# Patient Record
Sex: FEMALE | Race: WHITE | NOT HISPANIC OR LATINO | Employment: PART TIME | URBAN - METROPOLITAN AREA
[De-identification: names, ages, dates, MRNs, and addresses within clinical notes are randomized per-mention and may not be internally consistent; named-entity substitution may affect disease eponyms.]

---

## 2017-01-02 DIAGNOSIS — F41.9 ANXIETY AND DEPRESSION: Primary | ICD-10-CM

## 2017-01-02 DIAGNOSIS — F32.A ANXIETY AND DEPRESSION: Primary | ICD-10-CM

## 2017-01-02 NOTE — TELEPHONE ENCOUNTER
Medication is being filled for 1 time refill only due to:  Patient needs to be seen because due for follow up..

## 2017-01-02 NOTE — TELEPHONE ENCOUNTER
Sertraline     Last Written Prescription Date: 10/9/16  Last Fill Quantity: 135, # refills: 0  Last Office Visit with [facility] primary care provider:  7/25/16        Last PHQ-9 score on record=   PHQ-9 SCORE 7/25/2016   Total Score -   Total Score 0

## 2017-01-02 NOTE — TELEPHONE ENCOUNTER
Routing refill request to provider for review/approval because:  Drug interaction warning  Was going to refill 1 time but drug interaction warning popped up.

## 2017-01-11 DIAGNOSIS — E78.5 HYPERLIPIDEMIA WITH TARGET LDL LESS THAN 160: Primary | ICD-10-CM

## 2017-01-11 NOTE — TELEPHONE ENCOUNTER
pravastatin     Last Written Prescription Date: 12/14/16  Last Fill Quantity: 90, # refills: 0  Last Office Visit with [facility], [facility] or [facility] prescribing provider: 07/25/16       CHOL      196   6/8/2016  HDL       41   6/8/2016  LDL      110   6/8/2016  TRIG      226   6/8/2016  CHOLHDLRATIO      4.0   1/30/2014

## 2017-01-12 RX ORDER — PRAVASTATIN SODIUM 20 MG
20 TABLET ORAL DAILY
Qty: 90 TABLET | Refills: 1 | Status: SHIPPED | OUTPATIENT
Start: 2017-01-12 | End: 2017-08-21

## 2017-01-25 DIAGNOSIS — F41.9 ANXIETY AND DEPRESSION: Primary | ICD-10-CM

## 2017-01-25 DIAGNOSIS — F32.A ANXIETY AND DEPRESSION: Primary | ICD-10-CM

## 2017-01-25 NOTE — TELEPHONE ENCOUNTER
Sertraline     Last Written Prescription Date: 01/02/17  Last Fill Quantity: 45, # refills: 0  Last Office Visit with [facility] [place] [place] primary care provider:  07/25/16        Last PHQ-9 score on record=   PHQ-9 SCORE 7/25/2016   Total Score -   Total Score 0

## 2017-01-25 NOTE — TELEPHONE ENCOUNTER
Routing refill request to provider for review/approval because:  Labs not current:  PHQ9  Patient needs to be seen because:  Due for 6 month office visit.

## 2017-02-07 ENCOUNTER — OFFICE VISIT (OUTPATIENT)
Dept: FAMILY MEDICINE | Facility: CLINIC | Age: 62
End: 2017-02-07
Payer: COMMERCIAL

## 2017-02-07 VITALS
BODY MASS INDEX: 33.13 KG/M2 | HEART RATE: 74 BPM | SYSTOLIC BLOOD PRESSURE: 134 MMHG | WEIGHT: 208.6 LBS | OXYGEN SATURATION: 95 % | TEMPERATURE: 98.8 F | DIASTOLIC BLOOD PRESSURE: 74 MMHG

## 2017-02-07 DIAGNOSIS — F41.9 ANXIETY AND DEPRESSION: Primary | ICD-10-CM

## 2017-02-07 DIAGNOSIS — F32.A ANXIETY AND DEPRESSION: Primary | ICD-10-CM

## 2017-02-07 DIAGNOSIS — I10 HYPERTENSION, GOAL BELOW 140/90: ICD-10-CM

## 2017-02-07 PROCEDURE — 99213 OFFICE O/P EST LOW 20 MIN: CPT | Performed by: PHYSICIAN ASSISTANT

## 2017-02-07 RX ORDER — SERTRALINE HYDROCHLORIDE 100 MG/1
100 TABLET, FILM COATED ORAL DAILY
Qty: 30 TABLET | Refills: 4 | Status: SHIPPED | OUTPATIENT
Start: 2017-02-07 | End: 2017-08-21

## 2017-02-07 RX ORDER — PREDNISONE 5 MG/1
TABLET ORAL
Refills: 3 | COMMUNITY
Start: 2016-11-03 | End: 2021-05-20

## 2017-02-07 RX ORDER — LOSARTAN POTASSIUM 25 MG/1
25 TABLET ORAL DAILY
Qty: 30 TABLET | Refills: 5 | Status: SHIPPED | OUTPATIENT
Start: 2017-02-07 | End: 2017-08-28

## 2017-02-07 RX ORDER — ALPRAZOLAM 0.5 MG
0.5 TABLET ORAL 2 TIMES DAILY PRN
Qty: 20 TABLET | Refills: 2 | Status: SHIPPED | OUTPATIENT
Start: 2017-02-07 | End: 2017-08-25

## 2017-02-07 RX ORDER — LEUCOVORIN CALCIUM 5 MG/1
5 TABLET ORAL DAILY
COMMUNITY
Start: 2017-02-06

## 2017-02-07 NOTE — NURSING NOTE
"Chief Complaint   Patient presents with     Recheck Medication       Initial /83 mmHg  Pulse 74  Temp(Src) 98.8  F (37.1  C) (Oral)  Wt 208 lb 9.6 oz (94.62 kg)  SpO2 95% Estimated body mass index is 33.13 kg/(m^2) as calculated from the following:    Height as of 7/28/16: 5' 6.53\" (1.69 m).    Weight as of this encounter: 208 lb 9.6 oz (94.62 kg).  Medication Reconciliation: complete   Sheba Florian MA      "

## 2017-02-07 NOTE — MR AVS SNAPSHOT
"              After Visit Summary   2/7/2017    Erica Ramos    MRN: [id number]           Patient Information     Date Of Birth          1955        Visit Information        Provider Department      2/7/2017 1:00 PM Leonela Ibanez PA-C [facility]        Today's Diagnoses     Anxiety and depression    -  1     Essential hypertension with goal blood pressure less than 140/90           Care Instructions    Restart Zoloft by taking 25mg (half tab) daily x1 week. Then increase to 1 full tab daily x1 week. Then increase to 1.5 tabs daily x1 week then go  a new prescription for 100mg tabs.  We'll follow up over the phone in 6 weeks.        Follow-ups after your visit        Who to contact     Normal or non-critical lab and imaging results will be communicated to you by Exaleadhart, letter or phone within 4 business days after the clinic has received the results. If you do not hear from us within 7 days, please contact the clinic through Exaleadhart or phone. If you have a critical or abnormal lab result, we will notify you by phone as soon as possible.  Submit refill requests through Kappa Prime or call your pharmacy and they will forward the refill request to us. Please allow 3 business days for your refill to be completed.          If you need to speak with a  for additional information , please call: [phone]             Additional Information About Your Visit        Kappa Prime Information     Kappa Prime lets you send messages to your doctor, view your test results, renew your prescriptions, schedule appointments and more. To sign up, go to [email or web address] . Click on \"Log in\" on the left side of the screen, which will take you to the Welcome page. Then click on \"Sign up Now\" on the right side of the page.     You will be asked to enter the access code listed below, as well as some personal information. Please follow the directions to create your username and password.   "   Your access code is: [email or web address]  Expires: 2017  1:18 PM     Your access code will  in 90 days. If you need help or a new code, please call your [place] clinic or [phone].        Care EveryWhere ID     This is your Care EveryWhere ID. This could be used by other organizations to access your [place] medical records  [id number]        Your Vitals Were     Pulse Temperature Pulse Oximetry             74 98.8  F (37.1  C) (Oral) 95%          Blood Pressure from Last 3 Encounters:   17 154/83   16 123/79   16 151/87    Weight from Last 3 Encounters:   17 208 lb 9.6 oz (94.62 kg)   16 194 lb (87.998 kg)   16 194 lb 12.8 oz (88.361 kg)              We Performed the Following     DEPRESSION ACTION PLAN (DAP)          Today's Medication Changes          These changes are accurate as of: 17  1:18 PM.  If you have any questions, ask your nurse or doctor.               These medicines have changed or have updated prescriptions.        Dose/Directions    * sertraline 50 MG tablet   Commonly known as:  ZOLOFT   This may have changed:    - how much to take  - how to take this  - when to take this  - additional instructions   Used for:  Anxiety and depression   Changed by:  Leonela Ibanez PA-C        Take 1/2 tablet (25 mg) daily for 1 week, then increase to 1 tab daily x1 week then increase to one and a half (75mg) daily then increase to 100mg tabs daily   Quantity:  22 tablet   Refills:  0       * sertraline 100 MG tablet   Commonly known as:  ZOLOFT   This may have changed:  You were already taking a medication with the same name, and this prescription was added. Make sure you understand how and when to take each.   Used for:  Anxiety and depression   Changed by:  Leonela Ibanez PA-C        Dose:  100 mg   Take 1 tablet (100 mg) by mouth daily   Quantity:  30 tablet   Refills:  4       * Notice:  This list has 2 medication(s) that are the same as other  medications prescribed for you. Read the directions carefully, and ask your doctor or other care provider to review them with you.         Where to get your medicines      These medications were sent to [facility]/pharmacy #[id number] - [place], [place] - [address] [facility]  [address], [place] [address]     Phone:  [phone]    - losartan 25 MG tablet  - sertraline 100 MG tablet  - sertraline 50 MG tablet      Some of these will need a paper prescription and others can be bought over the counter.  Ask your nurse if you have questions.     Bring a paper prescription for each of these medications    - ALPRAZolam 0.5 MG tablet             Primary Care Provider Office Phone # Fax #    Leonela Ibanez PA-C [phone]       [facility] [address] [facility] [address]        Thank you!     Thank you for choosing [facility]  for your care. Our goal is always to provide you with excellent care. Hearing back from our patients is one way we can continue to improve our services. Please take a few minutes to complete the written survey that you may receive in the mail after your visit with us. Thank you!             Your Updated Medication List - Protect others around you: Learn how to safely use, store and throw away your medicines at www.disposemymeds.org.          This list is accurate as of: 2/7/17  1:18 PM.  Always use your most recent med list.                   Brand Name Dispense Instructions for use    ALPRAZolam 0.5 MG tablet    XANAX    20 tablet    Take 1 tablet (0.5 mg) by mouth 2 times daily as needed for anxiety - use sparingly       CALCIUM-VITAMIN D PO          HUMIRA 40 MG/0.8ML prefilled syringe kit   Generic drug:  adalimumab      Inject 40 mg Subcutaneous       leucovorin 5 MG tablet    WELLCOVORIN         losartan 25 MG tablet    COZAAR    30 tablet    Take 1 tablet (25 mg) by mouth daily       methotrexate 2.5 MG tablet CHEMO       Take 25 mg by mouth       pravastatin 20 MG tablet    PRAVACHOL    90 tablet    Take 1 tablet (20 mg) by mouth daily       predniSONE 5 MG tablet    DELTASONE     TAKE 2 TABLETS BY MOUTH ONCE DAILY WITH A MEAL.       * sertraline 50 MG tablet    ZOLOFT    22 tablet    Take 1/2 tablet (25 mg) daily for 1 week, then increase to 1 tab daily x1 week then increase to one and a half (75mg) daily then increase to 100mg tabs daily       * sertraline 100 MG tablet    ZOLOFT    30 tablet    Take 1 tablet (100 mg) by mouth daily       * Notice:  This list has 2 medication(s) that are the same as other medications prescribed for you. Read the directions carefully, and ask your doctor or other care provider to review them with you.

## 2017-02-07 NOTE — PROGRESS NOTES
SUBJECTIVE:                                                    Erica Ramos is a 61 year old female who presents to clinic today for the following health issues:    Hyperlipidemia Follow-Up      Rate your low fat/cholesterol diet?: good    Taking statin?  Yes, no muscle aches from statin    Other lipid medications/supplements?:  none     Hypertension Follow-up      Outpatient blood pressures are not being checked.    Low Salt Diet: low salt     Depression and Anxiety Follow-Up    Status since last visit: No change    Other associated symptoms:None    Complicating factors:     Significant life event: No     Current substance abuse: None    PHQ: 11  FLORIAN: 8  Has been off of Zoloft almost 1 month  Having anxiety related to Dora's dizziness work up  Uses Xanax less than 3 times per week typically      PHQ-9 SCORE 3/24/2016 4/19/2016 7/25/2016   Total Score - - -   Total Score 19 5 0     FLORIAN-7 SCORE 3/24/2016 4/19/2016 7/25/2016   Total Score - - -   Total Score 15 2 0        PHQ-9  English      PHQ-9   Any Language     GAD7         Amount of exercise or physical activity: None    Problems taking medications regularly: No    Medication side effects: weight gain, stopped prednisone x2-3 weeks     Diet: regular (no restrictions)      Problem list and histories reviewed & adjusted, as indicated.  Additional history: as documented    Patient Active Problem List   Diagnosis     Obesity     Advanced directives, counseling/discussion     Hyperlipidemia with target LDL less than 160     Hypertension, goal below 140/90     Anxiety and depression     Past Surgical History   Procedure Laterality Date     D & c       polyps     Surgical history of -   8-08     aneurysm repair       Social History   Substance Use Topics     Smoking status: Former Smoker     Quit date: 12/09/2006     Smokeless tobacco: Never Used     Alcohol Use: No     Family History   Problem Relation Age of Onset     CANCER Mother      non hodgkins lymphoma      C.A.D. Father      Hypertension Father      Hypertension Brother      CEREBROVASCULAR DISEASE Father      CANCER Daughter      lymphoma           ROS:  Constitutional, cardiac, and psychiatric systems are negative, except as otherwise noted.    OBJECTIVE:                                                    /74 mmHg  Pulse 74  Temp(Src) 98.8  F (37.1  C) (Oral)  Wt 208 lb 9.6 oz (94.62 kg)  SpO2 95%  Body mass index is 33.13 kg/(m^2).  Constitutional: healthy, alert, active, no distress.    Musculoskeletal: extremities normal- no gross deformities noted, gait normal, normal muscle tone and able to move about the exam room without difficulty.    Skin: no suspicious lesions or rashes appreciated on exposed areas  Neurologic: Gait normal. Moving all extremities spontaneously, no apparent weakness.    Psychiatric: mentation appears normal, thoughts are clear and concise. Converses appropriately. Affect is Appropriate/mood-congruent       ASSESSMENT:                                                      1. Anxiety and depression    2. Hypertension, goal below 140/90         PLAN:                                                    Patient will restart losartan. Labs and OV again in 6 months.    Will restart Zoloft. Xanax PRN - is using appropriately. Meds renewed.     Patient Instructions   Restart Zoloft by taking 25mg (half tab) daily x1 week. Then increase to 1 full tab daily x1 week. Then increase to 1.5 tabs daily x1 week then go  a new prescription for 100mg tabs.  We'll follow up over the phone in 6 weeks.       The patient was in agreement with the plan today and had no questions or concerns prior to leaving the clinic.     Leonela Ibanez PA-C  [facility]

## 2017-02-07 NOTE — PATIENT INSTRUCTIONS
Restart Zoloft by taking 25mg (half tab) daily x1 week. Then increase to 1 full tab daily x1 week. Then increase to 1.5 tabs daily x1 week then go  a new prescription for 100mg tabs.  We'll follow up over the phone in 6 weeks.

## 2017-03-16 ENCOUNTER — TELEPHONE (OUTPATIENT)
Dept: FAMILY MEDICINE | Facility: CLINIC | Age: 62
End: 2017-03-16

## 2017-03-21 ENCOUNTER — TELEPHONE (OUTPATIENT)
Dept: FAMILY MEDICINE | Facility: CLINIC | Age: 62
End: 2017-03-21

## 2017-03-21 ASSESSMENT — ANXIETY QUESTIONNAIRES
3. WORRYING TOO MUCH ABOUT DIFFERENT THINGS: NOT AT ALL
IF YOU CHECKED OFF ANY PROBLEMS ON THIS QUESTIONNAIRE, HOW DIFFICULT HAVE THESE PROBLEMS MADE IT FOR YOU TO DO YOUR WORK, TAKE CARE OF THINGS AT HOME, OR GET ALONG WITH OTHER PEOPLE: NOT DIFFICULT AT ALL
7. FEELING AFRAID AS IF SOMETHING AWFUL MIGHT HAPPEN: NOT AT ALL
2. NOT BEING ABLE TO STOP OR CONTROL WORRYING: NOT AT ALL
1. FEELING NERVOUS, ANXIOUS, OR ON EDGE: SEVERAL DAYS
5. BEING SO RESTLESS THAT IT IS HARD TO SIT STILL: NOT AT ALL
6. BECOMING EASILY ANNOYED OR IRRITABLE: SEVERAL DAYS
GAD7 TOTAL SCORE: 3

## 2017-03-21 ASSESSMENT — PATIENT HEALTH QUESTIONNAIRE - PHQ9: 5. POOR APPETITE OR OVEREATING: SEVERAL DAYS

## 2017-03-21 NOTE — TELEPHONE ENCOUNTER
Patient notified and voiced understanding and agreement.  Will meliton when needs refill.  Deb Green RN

## 2017-03-21 NOTE — TELEPHONE ENCOUNTER
"Things seem to be going well and scores are within \"normal\" range - I think let's continue the medication without changes.   "

## 2017-03-21 NOTE — TELEPHONE ENCOUNTER
Restarted Zoloft and up to 100mg daily.    1.) How are they doing since restarting medication? Good, doing well   2.) Have they noticed improvements in mood? yes  3.) Do they feel as though there is room for improvement? Maybe     PHQ-9 SCORE 4/19/2016 7/25/2016 3/21/2017   Total Score - - -   Total Score 5 0 5     FLORIAN-7 SCORE 4/19/2016 7/25/2016 3/21/2017   Total Score - - -   Total Score 2 0 3

## 2017-03-22 ASSESSMENT — PATIENT HEALTH QUESTIONNAIRE - PHQ9: SUM OF ALL RESPONSES TO PHQ QUESTIONS 1-9: 5

## 2017-03-22 ASSESSMENT — ANXIETY QUESTIONNAIRES: GAD7 TOTAL SCORE: 3

## 2017-06-29 ENCOUNTER — TELEPHONE (OUTPATIENT)
Dept: FAMILY MEDICINE | Facility: CLINIC | Age: 62
End: 2017-06-29

## 2017-06-29 NOTE — TELEPHONE ENCOUNTER
Pt states she used to see a rheumatologist at [facility] , he left and they will no longer let her get her blood drawn , she wants blood drawn here , advised rheumatology apt here ,she said no she just wants blood drawn , tried to explain that is not how it works , she said she just wants blood drawn then abruptly ended conversation.

## 2017-06-29 NOTE — TELEPHONE ENCOUNTER
Patient is calling would like to discuss blood work with provider. Please call to discuss. Thank you.

## 2017-08-21 DIAGNOSIS — F41.9 ANXIETY AND DEPRESSION: ICD-10-CM

## 2017-08-21 DIAGNOSIS — F32.A ANXIETY AND DEPRESSION: ICD-10-CM

## 2017-08-21 DIAGNOSIS — E78.5 HYPERLIPIDEMIA WITH TARGET LDL LESS THAN 160: ICD-10-CM

## 2017-08-21 NOTE — LETTER
August 23, 2017       Erica Ramos  [address]  [place] [address]      Erica Ramos     Your medication has been approved for sertraline (ZOLOFT) 100 MG tablet and pravastatin (PRAVACHOL) 20 MG tablet for one time only.    However, you are due for a follow up appointment and labs for further refills. Please schedule this visit at your earliest convenience.      Leonela Ibanez's Care Team

## 2017-08-22 NOTE — TELEPHONE ENCOUNTER
Routing refill request to provider for review/approval because:  Labs not current:  cholesterol  When is patient due for follow up with Sertraline?  Pended for completion and approval.  Deb Green RN

## 2017-08-22 NOTE — TELEPHONE ENCOUNTER
sertraline     Last Written Prescription Date: 07/26/17  Last Fill Quantity: 30, # refills: 4  Last Office Visit with [facility] primary care provider:  02/07/17        Last PHQ-9 score on record=   PHQ-9 SCORE 3/21/2017   Total Score -   Total Score 5         Pravastatin     Last Written Prescription Date: 05/06/17  Last Fill Quantity: 90, # refills: 1  Last Office Visit with [facility], [facility] or [facility] prescribing provider: 02/07/17       Lab Results   Component Value Date    CHOL 196 06/08/2016     Lab Results   Component Value Date    HDL 41 06/08/2016     Lab Results   Component Value Date     06/08/2016     Lab Results   Component Value Date    TRIG 226 06/08/2016     Lab Results   Component Value Date    CHOLHDLRATIO 4.0 01/30/2014

## 2017-08-23 RX ORDER — PRAVASTATIN SODIUM 20 MG
20 TABLET ORAL DAILY
Qty: 90 TABLET | Refills: 0 | Status: SHIPPED | OUTPATIENT
Start: 2017-08-23 | End: 2017-08-28

## 2017-08-23 RX ORDER — SERTRALINE HYDROCHLORIDE 100 MG/1
TABLET, FILM COATED ORAL
Qty: 90 TABLET | Refills: 0 | Status: SHIPPED | OUTPATIENT
Start: 2017-08-23 | End: 2017-08-28

## 2017-08-25 DIAGNOSIS — F32.A ANXIETY AND DEPRESSION: ICD-10-CM

## 2017-08-25 DIAGNOSIS — F41.9 ANXIETY AND DEPRESSION: ICD-10-CM

## 2017-08-25 RX ORDER — ALPRAZOLAM 0.5 MG
0.5 TABLET ORAL 2 TIMES DAILY PRN
Qty: 20 TABLET | Refills: 2 | Status: SHIPPED | OUTPATIENT
Start: 2017-08-25 | End: 2021-05-20

## 2017-08-25 NOTE — TELEPHONE ENCOUNTER
Patient is calling asking about status of Rx. Patient is completely out. Patient needs Rx pravastatin (PRAVACHOL) 20 MG tablet and sertraline (ZOLOFT) 100 MG tablet.   Please call to advise  Thank you

## 2017-08-28 ENCOUNTER — OFFICE VISIT (OUTPATIENT)
Dept: FAMILY MEDICINE | Facility: CLINIC | Age: 62
End: 2017-08-28
Payer: COMMERCIAL

## 2017-08-28 VITALS
DIASTOLIC BLOOD PRESSURE: 82 MMHG | WEIGHT: 201 LBS | TEMPERATURE: 98.3 F | SYSTOLIC BLOOD PRESSURE: 131 MMHG | HEART RATE: 66 BPM | BODY MASS INDEX: 31.92 KG/M2

## 2017-08-28 DIAGNOSIS — Z78.0 POSTMENOPAUSAL STATUS: ICD-10-CM

## 2017-08-28 DIAGNOSIS — M05.9 RHEUMATOID ARTHRITIS WITH POSITIVE RHEUMATOID FACTOR, INVOLVING UNSPECIFIED SITE (H): ICD-10-CM

## 2017-08-28 DIAGNOSIS — F41.9 ANXIETY AND DEPRESSION: ICD-10-CM

## 2017-08-28 DIAGNOSIS — F32.A ANXIETY AND DEPRESSION: ICD-10-CM

## 2017-08-28 DIAGNOSIS — Z12.11 COLON CANCER SCREENING: ICD-10-CM

## 2017-08-28 DIAGNOSIS — E78.5 HYPERLIPIDEMIA LDL GOAL <130: ICD-10-CM

## 2017-08-28 DIAGNOSIS — Z12.31 ENCOUNTER FOR SCREENING MAMMOGRAM FOR BREAST CANCER: ICD-10-CM

## 2017-08-28 DIAGNOSIS — I10 HYPERTENSION, GOAL BELOW 140/90: Primary | ICD-10-CM

## 2017-08-28 LAB
ANION GAP SERPL CALCULATED.3IONS-SCNC: 8 MMOL/L (ref 3–14)
BUN SERPL-MCNC: 14 MG/DL (ref 7–30)
CALCIUM SERPL-MCNC: 9.7 MG/DL (ref 8.5–10.1)
CHLORIDE SERPL-SCNC: 103 MMOL/L (ref 94–109)
CHOLEST SERPL-MCNC: 241 MG/DL
CO2 SERPL-SCNC: 28 MMOL/L (ref 20–32)
CREAT SERPL-MCNC: 0.64 MG/DL (ref 0.52–1.04)
CREAT UR-MCNC: 29 MG/DL
GFR SERPL CREATININE-BSD FRML MDRD: >90 ML/MIN/1.7M2
GLUCOSE SERPL-MCNC: 85 MG/DL (ref 70–99)
HDLC SERPL-MCNC: 55 MG/DL
LDLC SERPL CALC-MCNC: 151 MG/DL
MICROALBUMIN UR-MCNC: <5 MG/L
MICROALBUMIN/CREAT UR: NORMAL MG/G CR (ref 0–25)
NONHDLC SERPL-MCNC: 186 MG/DL
POTASSIUM SERPL-SCNC: 4.2 MMOL/L (ref 3.4–5.3)
SODIUM SERPL-SCNC: 139 MMOL/L (ref 133–144)
TRIGL SERPL-MCNC: 174 MG/DL

## 2017-08-28 PROCEDURE — 80048 BASIC METABOLIC PNL TOTAL CA: CPT | Performed by: PHYSICIAN ASSISTANT

## 2017-08-28 PROCEDURE — 80061 LIPID PANEL: CPT | Performed by: PHYSICIAN ASSISTANT

## 2017-08-28 PROCEDURE — 99214 OFFICE O/P EST MOD 30 MIN: CPT | Performed by: PHYSICIAN ASSISTANT

## 2017-08-28 PROCEDURE — 82043 UR ALBUMIN QUANTITATIVE: CPT | Performed by: PHYSICIAN ASSISTANT

## 2017-08-28 PROCEDURE — 36415 COLL VENOUS BLD VENIPUNCTURE: CPT | Performed by: PHYSICIAN ASSISTANT

## 2017-08-28 RX ORDER — LOSARTAN POTASSIUM 25 MG/1
25 TABLET ORAL DAILY
Qty: 90 TABLET | Refills: 3 | Status: SHIPPED | OUTPATIENT
Start: 2017-08-28 | End: 2018-08-22

## 2017-08-28 RX ORDER — PRAVASTATIN SODIUM 20 MG
20 TABLET ORAL DAILY
Qty: 90 TABLET | Refills: 3 | Status: SHIPPED | OUTPATIENT
Start: 2017-08-28 | End: 2018-08-22

## 2017-08-28 RX ORDER — SERTRALINE HYDROCHLORIDE 100 MG/1
100 TABLET, FILM COATED ORAL DAILY
Qty: 90 TABLET | Refills: 3 | Status: SHIPPED | OUTPATIENT
Start: 2017-08-28 | End: 2018-08-11

## 2017-08-28 ASSESSMENT — ANXIETY QUESTIONNAIRES
3. WORRYING TOO MUCH ABOUT DIFFERENT THINGS: MORE THAN HALF THE DAYS
7. FEELING AFRAID AS IF SOMETHING AWFUL MIGHT HAPPEN: MORE THAN HALF THE DAYS
6. BECOMING EASILY ANNOYED OR IRRITABLE: MORE THAN HALF THE DAYS
2. NOT BEING ABLE TO STOP OR CONTROL WORRYING: SEVERAL DAYS
1. FEELING NERVOUS, ANXIOUS, OR ON EDGE: MORE THAN HALF THE DAYS
IF YOU CHECKED OFF ANY PROBLEMS ON THIS QUESTIONNAIRE, HOW DIFFICULT HAVE THESE PROBLEMS MADE IT FOR YOU TO DO YOUR WORK, TAKE CARE OF THINGS AT HOME, OR GET ALONG WITH OTHER PEOPLE: VERY DIFFICULT

## 2017-08-28 ASSESSMENT — PATIENT HEALTH QUESTIONNAIRE - PHQ9: 5. POOR APPETITE OR OVEREATING: SEVERAL DAYS

## 2017-08-28 NOTE — MR AVS SNAPSHOT
After Visit Summary   8/28/2017    Erica Ramos    MRN: [id number]           Patient Information     Date Of Birth          1955        Visit Information        Provider Department      8/28/2017 12:00 PM Leonela Ibanez PA-C [facility]        Today's Diagnoses     Hypertension, goal below 140/90    -  1    Anxiety and depression        Hyperlipidemia LDL goal <130        Rheumatoid arthritis with positive rheumatoid factor, involving unspecified site (H)        Colon cancer screening        Encounter for screening mammogram for breast cancer        Postmenopausal status           Follow-ups after your visit        Additional Services     GASTROENTEROLOGY ADULT REF PROCEDURE ONLY       Last Lab Result: Creatinine (mg/dL)       Date                     Value                 06/08/2016               0.46 (L)         ----------  Body mass index is 31.92 kg/(m^2).     Needed:  No  Language:  English    Patient will be contacted to schedule procedure.     Please be aware that coverage of these services is subject to the terms and limitations of your health insurance plan.  Call member services at your health plan with any benefit or coverage questions.  Any procedures must be performed at a [place] facility OR coordinated by your clinic's referral office.    Please bring the following with you to your appointment:    (1) Any X-Rays, CTs or MRIs which have been performed.  Contact the facility where they were done to arrange for  prior to your scheduled appointment.    (2) List of current medications   (3) This referral request   (4) Any documents/labs given to you for this referral                  Future tests that were ordered for you today     Open Future Orders        Priority Expected Expires Ordered    *MA Screening Digital Bilateral Routine  8/28/2018 8/28/2017    DX Hip/Pelvis/Spine Routine  8/28/2018 8/28/2017            Who to contact     Normal or  "non-critical lab and imaging results will be communicated to you by MyChart, letter or phone within 4 business days after the clinic has received the results. If you do not hear from us within 7 days, please contact the clinic through FastHealthhart or phone. If you have a critical or abnormal lab result, we will notify you by phone as soon as possible.  Submit refill requests through Cardiio or call your pharmacy and they will forward the refill request to us. Please allow 3 business days for your refill to be completed.          If you need to speak with a  for additional information , please call: [phone]             Additional Information About Your Visit        FastHealthharNanoGram Information     Cardiio lets you send messages to your doctor, view your test results, renew your prescriptions, schedule appointments and more. To sign up, go to [email or web address] . Click on \"Log in\" on the left side of the screen, which will take you to the Welcome page. Then click on \"Sign up Now\" on the right side of the page.     You will be asked to enter the access code listed below, as well as some personal information. Please follow the directions to create your username and password.     Your access code is: [email or web address]  Expires: 2017 12:27 PM     Your access code will  in 90 days. If you need help or a new code, please call your [place] clinic or [phone].        Care EveryWhere ID     This is your Care EveryWhere ID. This could be used by other organizations to access your [place] medical records  [id number]        Your Vitals Were     Pulse Temperature BMI (Body Mass Index)             66 98.3  F (36.8  C) (Oral) 31.92 kg/m2          Blood Pressure from Last 3 Encounters:   17 131/82   17 134/74   16 123/79    Weight from Last 3 Encounters:   17 201 lb (91.2 kg)   17 208 lb 9.6 oz (94.6 kg)   16 194 lb (88 kg)              We Performed the Following     " Albumin Random Urine Quantitative with Creat Ratio     Basic metabolic panel     GASTROENTEROLOGY ADULT REF PROCEDURE ONLY     Lipid panel reflex to direct LDL          Today's Medication Changes          These changes are accurate as of: 8/28/17 12:34 PM.  If you have any questions, ask your nurse or doctor.               These medicines have changed or have updated prescriptions.        Dose/Directions    pravastatin 20 MG tablet   Commonly known as:  PRAVACHOL   This may have changed:  additional instructions   Used for:  Hyperlipidemia LDL goal <130        Dose:  20 mg   Take 1 tablet (20 mg) by mouth daily   Quantity:  90 tablet   Refills:  3       sertraline 100 MG tablet   Commonly known as:  ZOLOFT   This may have changed:  See the new instructions.   Used for:  Anxiety and depression        Dose:  100 mg   Take 1 tablet (100 mg) by mouth daily   Quantity:  90 tablet   Refills:  3            Where to get your medicines      These medications were sent to [facility]/pharmacy #[id number] - [place], [place] - [address], [place] [address]     Phone:  [phone]     losartan 25 MG tablet    pravastatin 20 MG tablet    sertraline 100 MG tablet                Primary Care Provider Office Phone # Fax #    Leonela Ibanez PA-C [phone]       [address] [place] [address]        Equal Access to Services     EDITH EDWARDS : Hadii carl ku hadasho Soomaali, waaxda luqadaha, qaybta kaalmada adeegyada, waxay warren kovcas. So [facility] [phone].    ATENCIÓN: Si habla español, tiene a gordon disposición servicios gratuitos de asistencia lingüística. Llame al [phone].    We comply with applicable federal civil rights laws and [place] laws. We do not discriminate on the basis of race, color, national origin, age, disability sex, sexual orientation or gender identity.            Thank you!     Thank you for choosing [place]  [facility]  for your care. Our goal is always to provide you with excellent care. Hearing back from our patients is one way we can continue to improve our services. Please take a few minutes to complete the written survey that you may receive in the mail after your visit with us. Thank you!             Your Updated Medication List - Protect others around you: Learn how to safely use, store and throw away your medicines at www.disposemymeds.org.          This list is accurate as of: 8/28/17 12:34 PM.  Always use your most recent med list.                   Brand Name Dispense Instructions for use Diagnosis    ALPRAZolam 0.5 MG tablet    XANAX    20 tablet    Take 1 tablet (0.5 mg) by mouth 2 times daily as needed for anxiety - use sparingly    Anxiety and depression       ASPIRIN ADULT LOW STRENGTH PO           CALCIUM-VITAMIN D PO           HUMIRA 40 MG/0.8ML prefilled syringe kit   Generic drug:  adalimumab      Inject 40 mg Subcutaneous        leucovorin 5 MG tablet    WELLCOVORIN          losartan 25 MG tablet    COZAAR    90 tablet    Take 1 tablet (25 mg) by mouth daily    Hypertension, goal below 140/90       methotrexate 2.5 MG tablet CHEMO      Take 25 mg by mouth        pravastatin 20 MG tablet    PRAVACHOL    90 tablet    Take 1 tablet (20 mg) by mouth daily    Hyperlipidemia LDL goal <130       predniSONE 5 MG tablet    DELTASONE     TAKE 2 TABLETS BY MOUTH ONCE DAILY WITH A MEAL.        sertraline 100 MG tablet    ZOLOFT    90 tablet    Take 1 tablet (100 mg) by mouth daily    Anxiety and depression

## 2017-08-28 NOTE — NURSING NOTE
"Chief Complaint   Patient presents with     Anxiety     Depression     Hypertension     Lipids       Initial /82  Pulse 66  Temp 98.3  F (36.8  C) (Oral)  Wt 201 lb (91.2 kg)  BMI 31.92 kg/m2 Estimated body mass index is 31.92 kg/(m^2) as calculated from the following:    Height as of 7/28/16: 5' 6.53\" (1.69 m).    Weight as of this encounter: 201 lb (91.2 kg).  Medication Reconciliation: complete       Malka Forbes CMA        "

## 2017-08-28 NOTE — PROGRESS NOTES
SUBJECTIVE:   Erica Ramos is a 62 year old female who presents to clinic today for the following health issues:      Hyperlipidemia Follow-Up      Rate your low fat/cholesterol diet?: fair    Taking statin?  No    Other lipid medications/supplements?:  none    Hypertension Follow-up      Outpatient blood pressures are being checked at store.  Results are 115/70s.    Low Salt Diet: low salt    Depression and Anxiety Follow-Up    Status since last visit: No change    Other associated symptoms:None    Complicating factors:     Significant life event: Yes-  Daughter in a MVA     Current substance abuse: None    PHQ-9 SCORE 4/19/2016 7/25/2016 3/21/2017   Total Score - - -   Total Score 5 0 5     FLORIAN-7 SCORE 4/19/2016 7/25/2016 3/21/2017   Total Score - - -   Total Score 2 0 3       PHQ-9  English  PHQ-9   Any Language  GAD7          Amount of exercise or physical activity: 2-3 days/week for an average of 15-30 minutes    Problems taking medications regularly: No    Medication side effects: none  Diet: regular (no restrictions)        Problem list and histories reviewed & adjusted, as indicated.  Additional history: as documented    Patient Active Problem List   Diagnosis     Obesity     Advanced directives, counseling/discussion     Hyperlipidemia LDL goal <130     Hypertension, goal below 140/90     Anxiety and depression     Rheumatoid arthritis with positive rheumatoid factor, involving unspecified site (H)     Past Surgical History:   Procedure Laterality Date     D & C      polyps     SURGICAL HISTORY OF - 8-08    aneurysm repair       Social History   Substance Use Topics     Smoking status: Former Smoker     Quit date: 12/9/2006     Smokeless tobacco: Never Used     Alcohol use No     Family History   Problem Relation Age of Onset     CANCER Mother      non hodgkins lymphoma     C.A.D. Father      Hypertension Father      Hypertension Brother      CEREBROVASCULAR DISEASE Father      CANCER Daughter       lymphoma             Reviewed and updated as needed this visit by clinical staffAllergies  Meds       Reviewed and updated as needed this visit by Provider         ROS:  Constitutional, cardiac, and psychiatric systems are negative, except as otherwise noted.      OBJECTIVE:                                                    /82  Pulse 66  Temp 98.3  F (36.8  C) (Oral)  Wt 201 lb (91.2 kg)  BMI 31.92 kg/m2  Body mass index is 31.92 kg/(m^2).  GENERAL APPEARANCE: healthy, alert and no distress  RESP: lungs clear to auscultation - no rales, rhonchi or wheezes  CV: regular rates and rhythm, normal S1 S2, no S3 or S4, no murmur, click or rub, no irregular beats, peripheral pulses strong and no bruits heard  PSYCH: mentation appears normal and affect normal/bright       ASSESSMENT:                                                      1. Hypertension, goal below 140/90    2. Anxiety and depression    3. Hyperlipidemia LDL goal <130    4. Rheumatoid arthritis with positive rheumatoid factor, involving unspecified site (H)    5. Colon cancer screening    6. Encounter for screening mammogram for breast cancer    7. Postmenopausal status         PLAN:                                                    Labs today. Meds renewed, no changes. Screening measures discussed. Follow up annually. She will continue to follow up with rheum for her RA.    The patient was in agreement with the plan today and had no questions or concerns prior to leaving the clinic.     Leonela Ibanez PA-C  [facility]

## 2017-08-28 NOTE — LETTER
August 30, 2017      Erica Ramos  [address]  [place] [address]        Dear ,    We are writing to inform you of your test results.    Return for repeat lipid panel in the next couple weeks. Fasting at least 8-10 hours, water is ok.    Resulted Orders   Lipid panel reflex to direct LDL   Result Value Ref Range    Cholesterol 241 (H) <200 mg/dL      Comment:      Desirable:       <200 mg/dl    Triglycerides 174 (H) <150 mg/dL      Comment:      Borderline high:  150-199 mg/dl  High:             200-499 mg/dl  Very high:       >499 mg/dl  Fasting specimen      HDL Cholesterol 55 >49 mg/dL    LDL Cholesterol Calculated 151 (H) <100 mg/dL      Comment:      Above desirable:  100-129 mg/dl  Borderline High:  130-159 mg/dL  High:             160-189 mg/dL  Very high:       >189 mg/dl      Non HDL Cholesterol 186 (H) <130 mg/dL      Comment:      Above Desirable:  130-159 mg/dl  Borderline high:  160-189 mg/dl  High:             190-219 mg/dl  Very high:       >219 mg/dl     Basic metabolic panel   Result Value Ref Range    Sodium 139 133 - 144 mmol/L    Potassium 4.2 3.4 - 5.3 mmol/L    Chloride 103 94 - 109 mmol/L    Carbon Dioxide 28 20 - 32 mmol/L    Anion Gap 8 3 - 14 mmol/L    Glucose 85 70 - 99 mg/dL      Comment:      Fasting specimen    Urea Nitrogen 14 7 - 30 mg/dL    Creatinine 0.64 0.52 - 1.04 mg/dL    GFR Estimate >90 >60 mL/min/1.7m2      Comment:      Non  GFR Calc    GFR Estimate If Black >90 >60 mL/min/1.7m2      Comment:       GFR Calc    Calcium 9.7 8.5 - 10.1 mg/dL   Albumin Random Urine Quantitative with Creat Ratio   Result Value Ref Range    Creatinine Urine 29 mg/dL    Albumin Urine mg/L <5 mg/L    Albumin Urine mg/g Cr Unable to calculate due to low value 0 - 25 mg/g Cr       If you have any questions or concerns, please call the clinic at the number listed above.       Sincerely,      Leonela Ibanez PA-C/susan

## 2017-08-29 ENCOUNTER — TELEPHONE (OUTPATIENT)
Dept: FAMILY MEDICINE | Facility: CLINIC | Age: 62
End: 2017-08-29

## 2017-08-29 DIAGNOSIS — E78.5 HYPERLIPIDEMIA LDL GOAL <130: Primary | ICD-10-CM

## 2017-08-29 NOTE — PROGRESS NOTES
Please send the following letter to the patient:    Nannette,    These are your lab results for your records.    Please call me with any questions or concerns.          Leonela Ibanez PA-C

## 2017-08-29 NOTE — TELEPHONE ENCOUNTER
Please call patient with the following info:    Urine protein test is negative. Kidney function looks great. Electrolytes are normal.  Her cholesterol has become elevated. Her LDL goal is <130. Is she taking her pravastatin regularly?

## 2017-08-30 NOTE — TELEPHONE ENCOUNTER
Spoke with patient and went over results per Lenoela Ibanez PA-C see below read word for word.  Patient verbalized understanding and stated she is taking her pravastatin as prescribed.  Patient was not fasting for the blood work.  Patent stated she had eaten cheese puffs and had coffee with cream before her appointment.

## 2017-09-14 ENCOUNTER — RADIANT APPOINTMENT (OUTPATIENT)
Dept: MAMMOGRAPHY | Facility: CLINIC | Age: 62
End: 2017-09-14
Attending: PHYSICIAN ASSISTANT
Payer: COMMERCIAL

## 2017-09-14 DIAGNOSIS — Z12.31 VISIT FOR SCREENING MAMMOGRAM: ICD-10-CM

## 2017-09-14 PROCEDURE — G0202 SCR MAMMO BI INCL CAD: HCPCS | Mod: TC

## 2017-10-20 ENCOUNTER — TELEPHONE (OUTPATIENT)
Dept: FAMILY MEDICINE | Facility: CLINIC | Age: 62
End: 2017-10-20

## 2017-10-20 NOTE — TELEPHONE ENCOUNTER
Patient states that she has jury duty on Monday and would like a note excusing her from it as she cannot leave her daughter alone.  Please call when done.    Thank you.

## 2017-10-20 NOTE — TELEPHONE ENCOUNTER
Spoke with patient. Her daughter has a low IQ, learning disability, hx of seizures. She was unable to find care for her for jury duty.    Done, in my out basket. Please fax letter with the following info:  Fax: [phone]  Attn: [facility] [employer]  Juror Number: [id number]

## 2017-10-20 NOTE — TELEPHONE ENCOUNTER
Spoke with patient, her daughter is 24 but has learning disabilities, seizures, hearing loss. Daughter is a vulnerable adult needs 24 hour care. Patient requesting to have letter faxed to [phone]

## 2017-10-20 NOTE — LETTER
[facility]  [address] [place] [address]  Phone: [phone]    October 20, 2017        Erica Ramos  [address]  [place] [address]        To whom it may concern:    RE: Erica Ramos, juror #[phone]    Erica is under my care as a patient. She reports she has a daughter with a history of a learning disability, low IQ, and seizures. She is unable to leave her daughter alone. Patient is scheduled for jury duty on Monday 10/23/17, but has been unable to find someone to care for her daughter while she's away. Please excuse her from jury duty.    Please contact me for questions or concerns.      Sincerely,          Leonela Ibanez PA-C

## 2017-12-19 ENCOUNTER — SURGERY (OUTPATIENT)
Age: 62
End: 2017-12-19

## 2017-12-19 ENCOUNTER — HOSPITAL ENCOUNTER (OUTPATIENT)
Facility: AMBULATORY SURGERY CENTER | Age: 62
Discharge: HOME | End: 2017-12-19
Attending: SURGERY | Admitting: SURGERY
Payer: COMMERCIAL

## 2017-12-19 VITALS
TEMPERATURE: 97 F | RESPIRATION RATE: 18 BRPM | OXYGEN SATURATION: 97 % | DIASTOLIC BLOOD PRESSURE: 71 MMHG | SYSTOLIC BLOOD PRESSURE: 122 MMHG | HEART RATE: 68 BPM

## 2017-12-19 LAB — COLONOSCOPY: NORMAL

## 2017-12-19 PROCEDURE — G8918 PT W/O PREOP ORDER IV AB PRO: HCPCS

## 2017-12-19 PROCEDURE — 99152 MOD SED SAME PHYS/QHP 5/>YRS: CPT | Performed by: SURGERY

## 2017-12-19 PROCEDURE — G8907 PT DOC NO EVENTS ON DISCHARG: HCPCS

## 2017-12-19 PROCEDURE — G0121 COLON CA SCRN NOT HI RSK IND: HCPCS | Performed by: SURGERY

## 2017-12-19 PROCEDURE — 45378 DIAGNOSTIC COLONOSCOPY: CPT

## 2017-12-19 RX ORDER — FENTANYL CITRATE 50 UG/ML
INJECTION, SOLUTION INTRAMUSCULAR; INTRAVENOUS PRN
Status: DISCONTINUED | OUTPATIENT
Start: 2017-12-19 | End: 2017-12-19 | Stop reason: HOSPADM

## 2017-12-19 RX ORDER — ONDANSETRON 2 MG/ML
4 INJECTION INTRAMUSCULAR; INTRAVENOUS
Status: DISCONTINUED | OUTPATIENT
Start: 2017-12-19 | End: 2017-12-20 | Stop reason: HOSPADM

## 2017-12-19 RX ORDER — LIDOCAINE 40 MG/G
CREAM TOPICAL
Status: DISCONTINUED | OUTPATIENT
Start: 2017-12-19 | End: 2017-12-20 | Stop reason: HOSPADM

## 2017-12-19 RX ADMIN — FENTANYL CITRATE 100 MCG: 50 INJECTION, SOLUTION INTRAMUSCULAR; INTRAVENOUS at 09:00

## 2018-02-07 ENCOUNTER — TELEPHONE (OUTPATIENT)
Dept: AUDIOLOGY | Facility: CLINIC | Age: 63
End: 2018-02-07

## 2018-02-07 NOTE — TELEPHONE ENCOUNTER
Informed patient that the wax guards and medium closed domes she requested are ready for pick-up at the .     Baron Holm, CCC-A

## 2018-08-11 DIAGNOSIS — F32.A ANXIETY AND DEPRESSION: ICD-10-CM

## 2018-08-11 DIAGNOSIS — F41.9 ANXIETY AND DEPRESSION: ICD-10-CM

## 2018-08-13 ENCOUNTER — TRANSFERRED RECORDS (OUTPATIENT)
Dept: HEALTH INFORMATION MANAGEMENT | Facility: CLINIC | Age: 63
End: 2018-08-13

## 2018-08-13 NOTE — TELEPHONE ENCOUNTER
Please schedule annual exam.  MA- needs PHQ-9  PHQ-9 SCORE 4/19/2016 7/25/2016 3/21/2017   Total Score - - -   Total Score 5 0 5

## 2018-08-13 NOTE — TELEPHONE ENCOUNTER
"Requested Prescriptions   Pending Prescriptions Disp Refills     sertraline (ZOLOFT) 100 MG tablet [Pharmacy Med Name: SERTRALINE  MG TABLET] 90 tablet 3    Last Written Prescription Date:  05/16/18  Last Fill Quantity: 90,  # refills: 3   Last office visit: 8/28/2017 with prescribing provider:  ELIGIO Ibanez Future Office Visit:     Sig: TAKE 1 TABLET (100 MG) BY MOUTH DAILY    SSRIs Protocol Passed    8/11/2018  1:12 AM   FLORIAN-7 SCORE 4/19/2016 7/25/2016 3/21/2017   Total Score - - -   Total Score 2 0 3     PHQ-9 SCORE 4/19/2016 7/25/2016 3/21/2017   Total Score - - -   Total Score 5 0 5         Passed - Recent (12 mo) or future (30 days) visit within the authorizing provider's specialty    Patient had office visit in the last 12 months or has a visit in the next 30 days with authorizing provider or within the authorizing provider's specialty.  See \"Patient Info\" tab in inbasket, or \"Choose Columns\" in Meds & Orders section of the refill encounter.           Passed - Patient is age 18 or older       Passed - No active pregnancy on record       Passed - No positive pregnancy test in last 12 months          "

## 2018-08-14 RX ORDER — SERTRALINE HYDROCHLORIDE 100 MG/1
100 TABLET, FILM COATED ORAL DAILY
Qty: 90 TABLET | Refills: 3 | Status: SHIPPED | OUTPATIENT
Start: 2018-08-14 | End: 2018-08-22

## 2018-08-14 NOTE — TELEPHONE ENCOUNTER
PHQ-9 SCORE 7/25/2016 3/21/2017 8/14/2018   Total Score - - -   Total Score 0 5 5     Appointment made for AFE August 22, 2018    Kodak Brewster MA

## 2018-08-15 ASSESSMENT — PATIENT HEALTH QUESTIONNAIRE - PHQ9: SUM OF ALL RESPONSES TO PHQ QUESTIONS 1-9: 5

## 2018-08-22 ENCOUNTER — OFFICE VISIT (OUTPATIENT)
Dept: FAMILY MEDICINE | Facility: CLINIC | Age: 63
End: 2018-08-22
Payer: COMMERCIAL

## 2018-08-22 VITALS
BODY MASS INDEX: 31.01 KG/M2 | RESPIRATION RATE: 18 BRPM | DIASTOLIC BLOOD PRESSURE: 76 MMHG | HEIGHT: 67 IN | WEIGHT: 197.6 LBS | HEART RATE: 78 BPM | OXYGEN SATURATION: 94 % | TEMPERATURE: 97.9 F | SYSTOLIC BLOOD PRESSURE: 118 MMHG

## 2018-08-22 DIAGNOSIS — F41.9 ANXIETY AND DEPRESSION: ICD-10-CM

## 2018-08-22 DIAGNOSIS — M05.9 RHEUMATOID ARTHRITIS WITH POSITIVE RHEUMATOID FACTOR, INVOLVING UNSPECIFIED SITE (H): ICD-10-CM

## 2018-08-22 DIAGNOSIS — Z00.01 ENCOUNTER FOR ROUTINE ADULT HEALTH EXAMINATION WITH ABNORMAL FINDINGS: Primary | ICD-10-CM

## 2018-08-22 DIAGNOSIS — E78.5 HYPERLIPIDEMIA LDL GOAL <130: ICD-10-CM

## 2018-08-22 DIAGNOSIS — Z01.818 PREOP GENERAL PHYSICAL EXAM: ICD-10-CM

## 2018-08-22 DIAGNOSIS — I10 HYPERTENSION, GOAL BELOW 140/90: ICD-10-CM

## 2018-08-22 DIAGNOSIS — F32.A ANXIETY AND DEPRESSION: ICD-10-CM

## 2018-08-22 LAB
ALBUMIN SERPL-MCNC: 4.2 G/DL (ref 3.4–5)
ALP SERPL-CCNC: 50 U/L (ref 40–150)
ALT SERPL W P-5'-P-CCNC: 40 U/L (ref 0–50)
ANION GAP SERPL CALCULATED.3IONS-SCNC: 6 MMOL/L (ref 3–14)
AST SERPL W P-5'-P-CCNC: 29 U/L (ref 0–45)
BILIRUB SERPL-MCNC: 0.5 MG/DL (ref 0.2–1.3)
BUN SERPL-MCNC: 16 MG/DL (ref 7–30)
CALCIUM SERPL-MCNC: 9.3 MG/DL (ref 8.5–10.1)
CHLORIDE SERPL-SCNC: 104 MMOL/L (ref 94–109)
CHOLEST SERPL-MCNC: 213 MG/DL
CO2 SERPL-SCNC: 28 MMOL/L (ref 20–32)
CREAT SERPL-MCNC: 0.67 MG/DL (ref 0.52–1.04)
CREAT UR-MCNC: 180 MG/DL
GFR SERPL CREATININE-BSD FRML MDRD: 89 ML/MIN/1.7M2
GLUCOSE SERPL-MCNC: 94 MG/DL (ref 70–99)
HDLC SERPL-MCNC: 60 MG/DL
LDLC SERPL CALC-MCNC: 129 MG/DL
MICROALBUMIN UR-MCNC: 12 MG/L
MICROALBUMIN/CREAT UR: 6.89 MG/G CR (ref 0–25)
NONHDLC SERPL-MCNC: 153 MG/DL
POTASSIUM SERPL-SCNC: 4 MMOL/L (ref 3.4–5.3)
PROT SERPL-MCNC: 8.2 G/DL (ref 6.8–8.8)
SODIUM SERPL-SCNC: 138 MMOL/L (ref 133–144)
TRIGL SERPL-MCNC: 120 MG/DL

## 2018-08-22 PROCEDURE — 85018 HEMOGLOBIN: CPT | Performed by: PHYSICIAN ASSISTANT

## 2018-08-22 PROCEDURE — 82043 UR ALBUMIN QUANTITATIVE: CPT | Performed by: PHYSICIAN ASSISTANT

## 2018-08-22 PROCEDURE — 80053 COMPREHEN METABOLIC PANEL: CPT | Performed by: PHYSICIAN ASSISTANT

## 2018-08-22 PROCEDURE — 80061 LIPID PANEL: CPT | Performed by: PHYSICIAN ASSISTANT

## 2018-08-22 PROCEDURE — 36415 COLL VENOUS BLD VENIPUNCTURE: CPT | Performed by: PHYSICIAN ASSISTANT

## 2018-08-22 PROCEDURE — 85025 COMPLETE CBC W/AUTO DIFF WBC: CPT | Performed by: PHYSICIAN ASSISTANT

## 2018-08-22 PROCEDURE — 99396 PREV VISIT EST AGE 40-64: CPT | Performed by: PHYSICIAN ASSISTANT

## 2018-08-22 RX ORDER — LOSARTAN POTASSIUM 25 MG/1
25 TABLET ORAL DAILY
Qty: 90 TABLET | Refills: 3 | Status: SHIPPED | OUTPATIENT
Start: 2018-08-22 | End: 2019-08-01

## 2018-08-22 RX ORDER — SERTRALINE HYDROCHLORIDE 100 MG/1
150 TABLET, FILM COATED ORAL DAILY
Qty: 135 TABLET | Refills: 3 | Status: SHIPPED | OUTPATIENT
Start: 2018-08-22 | End: 2019-08-01

## 2018-08-22 RX ORDER — PRAVASTATIN SODIUM 20 MG
20 TABLET ORAL DAILY
Qty: 90 TABLET | Refills: 3 | Status: SHIPPED | OUTPATIENT
Start: 2018-08-22 | End: 2019-08-01

## 2018-08-22 ASSESSMENT — ANXIETY QUESTIONNAIRES
7. FEELING AFRAID AS IF SOMETHING AWFUL MIGHT HAPPEN: NOT AT ALL
3. WORRYING TOO MUCH ABOUT DIFFERENT THINGS: MORE THAN HALF THE DAYS
2. NOT BEING ABLE TO STOP OR CONTROL WORRYING: MORE THAN HALF THE DAYS
GAD7 TOTAL SCORE: 8
1. FEELING NERVOUS, ANXIOUS, OR ON EDGE: SEVERAL DAYS
5. BEING SO RESTLESS THAT IT IS HARD TO SIT STILL: NOT AT ALL
6. BECOMING EASILY ANNOYED OR IRRITABLE: MORE THAN HALF THE DAYS
IF YOU CHECKED OFF ANY PROBLEMS ON THIS QUESTIONNAIRE, HOW DIFFICULT HAVE THESE PROBLEMS MADE IT FOR YOU TO DO YOUR WORK, TAKE CARE OF THINGS AT HOME, OR GET ALONG WITH OTHER PEOPLE: VERY DIFFICULT

## 2018-08-22 ASSESSMENT — PATIENT HEALTH QUESTIONNAIRE - PHQ9: 5. POOR APPETITE OR OVEREATING: SEVERAL DAYS

## 2018-08-22 NOTE — PATIENT INSTRUCTIONS
Make sure you schedule a bone scan (DEXA) with your next mammogram. This is for osteoporosis screening.      Preventive Health Recommendations  Female Ages 50 - 64    Yearly exam: See your health care provider every year in order to  o Review health changes.   o Discuss preventive care.    o Review your medicines if your doctor has prescribed any.      Get a Pap test every three years (unless you have an abnormal result and your provider advises testing more often).    If you get Pap tests with HPV test, you only need to test every 5 years, unless you have an abnormal result.     You do not need a Pap test if your uterus was removed (hysterectomy) and you have not had cancer.    You should be tested each year for STDs (sexually transmitted diseases) if you're at risk.     Have a mammogram every 1 to 2 years.    Have a colonoscopy at age 50, or have a yearly FIT test (stool test). These exams screen for colon cancer.      Have a cholesterol test every 5 years, or more often if advised.    Have a diabetes test (fasting glucose) every three years. If you are at risk for diabetes, you should have this test more often.     If you are at risk for osteoporosis (brittle bone disease), think about having a bone density scan (DEXA).    Shots: Get a flu shot each year. Get a tetanus shot every 10 years.    Nutrition:     Eat at least 5 servings of fruits and vegetables each day.    Eat whole-grain bread, whole-wheat pasta and brown rice instead of white grains and rice.    Get adequate Calcium and Vitamin D.     Lifestyle    Exercise at least 150 minutes a week (30 minutes a day, 5 days a week). This will help you control your weight and prevent disease.    Limit alcohol to one drink per day.    No smoking.     Wear sunscreen to prevent skin cancer.     See your dentist every six months for an exam and cleaning.    See your eye doctor every 1 to 2 years.

## 2018-08-22 NOTE — MR AVS SNAPSHOT
After Visit Summary   8/22/2018    Erica Ramos    MRN: [id number]           Patient Information     Date Of Birth          1955        Visit Information        Provider Department      8/22/2018 8:00 AM Leonela Ibanez PA-C [facility]        Today's Diagnoses     Hyperlipidemia LDL goal <130    -  1    Hypertension, goal below 140/90        Rheumatoid arthritis with positive rheumatoid factor, involving unspecified site (H)        Anxiety and depression        Encounter for routine adult health examination with abnormal findings          Care Instructions    Make sure you schedule a bone scan (DEXA) with your next mammogram. This is for osteoporosis screening.      Preventive Health Recommendations  Female Ages 50 - 64    Yearly exam: See your health care provider every year in order to  o Review health changes.   o Discuss preventive care.    o Review your medicines if your doctor has prescribed any.      Get a Pap test every three years (unless you have an abnormal result and your provider advises testing more often).    If you get Pap tests with HPV test, you only need to test every 5 years, unless you have an abnormal result.     You do not need a Pap test if your uterus was removed (hysterectomy) and you have not had cancer.    You should be tested each year for STDs (sexually transmitted diseases) if you're at risk.     Have a mammogram every 1 to 2 years.    Have a colonoscopy at age 50, or have a yearly FIT test (stool test). These exams screen for colon cancer.      Have a cholesterol test every 5 years, or more often if advised.    Have a diabetes test (fasting glucose) every three years. If you are at risk for diabetes, you should have this test more often.     If you are at risk for osteoporosis (brittle bone disease), think about having a bone density scan (DEXA).    Shots: Get a flu shot each year. Get a tetanus shot every 10 years.    Nutrition:     Eat at  "least 5 servings of fruits and vegetables each day.    Eat whole-grain bread, whole-wheat pasta and brown rice instead of white grains and rice.    Get adequate Calcium and Vitamin D.     Lifestyle    Exercise at least 150 minutes a week (30 minutes a day, 5 days a week). This will help you control your weight and prevent disease.    Limit alcohol to one drink per day.    No smoking.     Wear sunscreen to prevent skin cancer.     See your dentist every six months for an exam and cleaning.    See your eye doctor every 1 to 2 years.            Follow-ups after your visit        Your next 10 appointments already scheduled     Aug 24, 2018 10:20 AM CDT   Pre-Op [facility] with Leonela Ibanez PA-C   [facility]    [address] [address] [phone]              Who to contact     Normal or non-critical lab and imaging results will be communicated to you by MyChart, letter or phone within 4 business days after the clinic has received the results. If you do not hear from us within 7 days, please contact the clinic through MyChart or phone. If you have a critical or abnormal lab result, we will notify you by phone as soon as possible.  Submit refill requests through [employer] or call your pharmacy and they will forward the refill request to us. Please allow 3 business days for your refill to be completed.          If you need to speak with a  for additional information , please call: [phone]             Additional Information About Your Visit        Care EveryWhere ID     This is your Care EveryWhere ID. This could be used by other organizations to access your [facility] medical records  [id number]        Your Vitals Were     Pulse Temperature Respirations Height Pulse Oximetry BMI (Body Mass Index)    78 97.9  F (36.6  C) (Tympanic) 18 5' 6.65\" (1.693 m) 94% 31.27 kg/m2       Blood Pressure from Last 3 Encounters:   08/22/18 118/76 "   12/19/17 122/71   08/28/17 131/82    Weight from Last 3 Encounters:   08/22/18 197 lb 9.6 oz (89.6 kg)   08/28/17 201 lb (91.2 kg)   02/07/17 208 lb 9.6 oz (94.6 kg)              We Performed the Following     Albumin Random Urine Quantitative with Creat Ratio     Comprehensive metabolic panel     DEPRESSION ACTION PLAN (DAP)     Hemoglobin     Lipid panel reflex to direct LDL Fasting          Today's Medication Changes          These changes are accurate as of 8/22/18  8:51 AM.  If you have any questions, ask your nurse or doctor.               These medicines have changed or have updated prescriptions.        Dose/Directions    sertraline 100 MG tablet   Commonly known as:  ZOLOFT   This may have changed:    - how much to take  - additional instructions   Used for:  Anxiety and depression   Changed by:  Leonela Ibanez PA-C        Dose:  150 mg   Take 1.5 tablets (150 mg) by mouth daily   Quantity:  135 tablet   Refills:  3            Where to get your medicines      These medications were sent to [facility]/pharmacy #[id number] - [place], [place] - [address] [place] [address] [address] OF [address], [facility] [address]     Phone:  [phone]     losartan 25 MG tablet    pravastatin 20 MG tablet    sertraline 100 MG tablet                Primary Care Provider Office Phone # Fax #    Leonela Ibanez PA-C [phone]       [address]  [place] [address]        Equal Access to Services     [facility] AH: Hadii aad ku hadasho Soomaali, waaxda luqadaha, qaybta kaalmada adeegyada, waxay odessain haykaryn kaden shrestha la'aan ah. So [facility] [phone].    ATENCIÓN: Si habla español, tiene a gordon disposición servicios gratuitos de asistencia lingüística. Llame al [phone].    We comply with applicable federal civil rights laws and [place] laws. We do not discriminate on the basis of race, color, national origin, age, disability, sex, sexual orientation, or gender identity.             Thank you!     Thank you for choosing [facility]  for your care. Our goal is always to provide you with excellent care. Hearing back from our patients is one way we can continue to improve our services. Please take a few minutes to complete the written survey that you may receive in the mail after your visit with us. Thank you!             Your Updated Medication List - Protect others around you: Learn how to safely use, store and throw away your medicines at www.disposemymeds.org.          This list is accurate as of 8/22/18  8:51 AM.  Always use your most recent med list.                   Brand Name Dispense Instructions for use Diagnosis    ALPRAZolam 0.5 MG tablet    XANAX    20 tablet    Take 1 tablet (0.5 mg) by mouth 2 times daily as needed for anxiety - use sparingly    Anxiety and depression       ASPIRIN ADULT LOW STRENGTH PO           CALCIUM-VITAMIN D PO           HUMIRA 40 MG/0.8ML prefilled syringe kit   Generic drug:  adalimumab      Inject 40 mg Subcutaneous        leucovorin 5 MG tablet    WELLCOVORIN          losartan 25 MG tablet    COZAAR    90 tablet    Take 1 tablet (25 mg) by mouth daily    Hypertension, goal below 140/90       methotrexate 2.5 MG tablet CHEMO      Take 25 mg by mouth        pravastatin 20 MG tablet    PRAVACHOL    90 tablet    Take 1 tablet (20 mg) by mouth daily    Hyperlipidemia LDL goal <130       predniSONE 5 MG tablet    DELTASONE     TAKE 2 TABLETS BY MOUTH ONCE DAILY WITH A MEAL.        sertraline 100 MG tablet    ZOLOFT    135 tablet    Take 1.5 tablets (150 mg) by mouth daily    Anxiety and depression

## 2018-08-22 NOTE — PROGRESS NOTES
SUBJECTIVE:   CC: Erica Ramos is an 63 year old woman who presents for preventive health visit.     Healthy Habits:    Do you get at least three servings of calcium containing foods daily (dairy, green leafy vegetables, etc.)? yes    Amount of exercise or daily activities, outside of work: 7 day(s) per week    Problems taking medications regularly No    Medication side effects: No    Have you had an eye exam in the past two years? yes    Do you see a dentist twice per year? yes    Do you have sleep apnea, excessive snoring or daytime drowsiness?yes-Snoring       PROBLEMS TO ADD ON...  Patient informed that anything we discuss that is not related to preventative medicine, may be billed for; patient verbalizes understanding.    Refill(s) and/or routine labs needed for chronic conditions: Anx/depr, lipids, HTN.   Patient is doing well, anxiety has worsened some however  Needing possible surgery for brain an  -------------------------------------    Today's PHQ-2 Score:   PHQ-2 ( 1999 [employer]) 8/22/2018 7/18/2013   Q1: Little interest or pleasure in doing things 0 0   Q2: Feeling down, depressed or hopeless 0 0   PHQ-2 Score 0 0       Abuse: Current or Past(Physical, Sexual or Emotional)- No  Do you feel safe in your environment - Yes    Social History   Substance Use Topics     Smoking status: Former Smoker     Quit date: 12/9/2006     Smokeless tobacco: Never Used     Alcohol use No     If you drink alcohol do you typically have >3 drinks per day or >7 drinks per week? No                     Reviewed orders with patient.  Reviewed health maintenance and updated orders accordingly - Yes  Labs reviewed in [facility]    Patient over age 50, mutual decision to screen reflected in health maintenance.    Pertinent mammograms are reviewed under the imaging tab.  History of abnormal Pap smear:   NO - age 30- 65 PAP every 3 years recommended  Last 3 Pap and HPV Results:   PAP / HPV 7/25/2016 7/18/2013 9/2/2010   PAP NIL  "NIL NIL     PAP / HPV 7/25/2016 7/18/2013 9/2/2010   PAP NIL NIL NIL     Reviewed and updated as needed this visit by clinical staff  Tobacco  Allergies  Meds         Reviewed and updated as needed this visit by Provider        Past Medical History:   Diagnosis Date     Brain aneurysm      Depression, major      Hyperlipidaemia         ROS:  Other than what is noted in the HPI and PMH a complete review of systems is otherwise negative including: Constitutional, HEENT, endocrine, cardiovascular, respiratory, GI/, musculoskeletal, neuro, and psychiatric.     OBJECTIVE:   /76  Pulse 78  Temp 97.9  F (36.6  C) (Tympanic)  Resp 18  Ht 5' 6.65\" (1.693 m)  Wt 197 lb 9.6 oz (89.6 kg)  SpO2 94%  BMI 31.27 kg/m2  EXAM:  GENERAL: healthy, alert and no distress  EYES: Eyes grossly normal to inspection, PERRL and conjunctivae and sclerae normal  HENT: ear canals and TM's normal, nose and mouth without ulcers or lesions  NECK: no adenopathy, no asymmetry, masses, or scars and thyroid normal to palpation  RESP: lungs clear to auscultation - no rales, rhonchi or wheezes  BREAST: normal without masses, tenderness or nipple discharge and no palpable axillary masses or adenopathy  CV: regular rate and rhythm, normal S1 S2, no S3 or S4, no murmur, click or rub, no peripheral edema and peripheral pulses strong  ABDOMEN: soft, nontender, no hepatosplenomegaly, no masses and bowel sounds normal  MS: no gross musculoskeletal defects noted, no edema  SKIN: no suspicious lesions or rashes  NEURO: Normal strength and tone, mentation intact and speech normal  PSYCH: mentation appears normal, affect normal/bright    ASSESSMENT/PLAN:       ICD-10-CM    1. Encounter for routine adult health examination with abnormal findings Z00.01 Hemoglobin   2. Hyperlipidemia LDL goal <130 E78.5 Lipid panel reflex to direct LDL Fasting     pravastatin (PRAVACHOL) 20 MG tablet   3. Hypertension, goal below 140/90 I10 Comprehensive metabolic " "panel     Albumin Random Urine Quantitative with Creat Ratio     losartan (COZAAR) 25 MG tablet   4. Rheumatoid arthritis with positive rheumatoid factor, involving unspecified site (H) M05.9    5. Anxiety and depression F41.8 sertraline (ZOLOFT) 100 MG tablet       Zoloft increased to 150mg daily.     Routine labs today, screenings discussed. Other meds renewed without changes. Follow up annually otherwise. She continues to see rheumo Q6 months.      COUNSELING:   Reviewed preventive health counseling, as reflected in patient instructions    BP Readings from Last 1 Encounters:   08/22/18 118/76     Estimated body mass index is 31.27 kg/(m^2) as calculated from the following:    Height as of this encounter: 5' 6.65\" (1.693 m).    Weight as of this encounter: 197 lb 9.6 oz (89.6 kg).     reports that she quit smoking about 11 years ago. She has never used smokeless tobacco.      Counseling Resources:  ATP IV Guidelines  Pooled Cohorts Equation Calculator  Breast Cancer Risk Calculator  FRAX Risk Assessment  ICSI Preventive Guidelines  Dietary Guidelines for Americans, 2010  USDA's MyPlate  ASA Prophylaxis  Lung CA Screening    Leonela Ibanez PA-C  [facility]  "

## 2018-08-24 ENCOUNTER — OFFICE VISIT (OUTPATIENT)
Dept: FAMILY MEDICINE | Facility: CLINIC | Age: 63
End: 2018-08-24
Payer: COMMERCIAL

## 2018-08-24 ENCOUNTER — TELEPHONE (OUTPATIENT)
Dept: FAMILY MEDICINE | Facility: CLINIC | Age: 63
End: 2018-08-24

## 2018-08-24 VITALS
BODY MASS INDEX: 31.62 KG/M2 | DIASTOLIC BLOOD PRESSURE: 76 MMHG | OXYGEN SATURATION: 94 % | RESPIRATION RATE: 18 BRPM | SYSTOLIC BLOOD PRESSURE: 131 MMHG | TEMPERATURE: 97.1 F | WEIGHT: 199.8 LBS | HEART RATE: 73 BPM

## 2018-08-24 DIAGNOSIS — Z01.818 PREOP GENERAL PHYSICAL EXAM: Primary | ICD-10-CM

## 2018-08-24 DIAGNOSIS — I67.1 CEREBRAL ANEURYSM: ICD-10-CM

## 2018-08-24 DIAGNOSIS — I10 HYPERTENSION, GOAL BELOW 140/90: ICD-10-CM

## 2018-08-24 LAB
BASOPHILS # BLD AUTO: 0 10E9/L (ref 0–0.2)
BASOPHILS NFR BLD AUTO: 0.7 %
DIFFERENTIAL METHOD BLD: ABNORMAL
EOSINOPHIL # BLD AUTO: 0.2 10E9/L (ref 0–0.7)
EOSINOPHIL NFR BLD AUTO: 4.2 %
ERYTHROCYTE [DISTWIDTH] IN BLOOD BY AUTOMATED COUNT: 13.3 % (ref 10–15)
HCT VFR BLD AUTO: 42 % (ref 35–47)
HGB BLD-MCNC: 14.3 G/DL (ref 11.7–15.7)
HGB BLD-MCNC: NORMAL G/DL (ref 11.7–15.7)
INR PPP: 0.98 (ref 0.86–1.14)
LYMPHOCYTES # BLD AUTO: 1.8 10E9/L (ref 0.8–5.3)
LYMPHOCYTES NFR BLD AUTO: 39.3 %
MCH RBC QN AUTO: 33.2 PG (ref 26.5–33)
MCHC RBC AUTO-ENTMCNC: 34 G/DL (ref 31.5–36.5)
MCV RBC AUTO: 97 FL (ref 78–100)
MONOCYTES # BLD AUTO: 0.7 10E9/L (ref 0–1.3)
MONOCYTES NFR BLD AUTO: 16.5 %
NEUTROPHILS # BLD AUTO: 1.8 10E9/L (ref 1.6–8.3)
NEUTROPHILS NFR BLD AUTO: 39.3 %
PLATELET # BLD AUTO: 298 10E9/L (ref 150–450)
RBC # BLD AUTO: 4.31 10E12/L (ref 3.8–5.2)
WBC # BLD AUTO: 4.5 10E9/L (ref 4–11)

## 2018-08-24 PROCEDURE — 93000 ELECTROCARDIOGRAM COMPLETE: CPT | Performed by: PHYSICIAN ASSISTANT

## 2018-08-24 PROCEDURE — 85610 PROTHROMBIN TIME: CPT | Performed by: PHYSICIAN ASSISTANT

## 2018-08-24 PROCEDURE — 36415 COLL VENOUS BLD VENIPUNCTURE: CPT | Performed by: PHYSICIAN ASSISTANT

## 2018-08-24 PROCEDURE — 99214 OFFICE O/P EST MOD 30 MIN: CPT | Performed by: PHYSICIAN ASSISTANT

## 2018-08-24 ASSESSMENT — ANXIETY QUESTIONNAIRES: GAD7 TOTAL SCORE: 8

## 2018-08-24 NOTE — TELEPHONE ENCOUNTER
[facility] is calling to request pre op done 08/24/18 and If labs what labs so they dont duplicate, fax: [phone]. Please call to advise. Thank you.

## 2018-08-24 NOTE — PROGRESS NOTES
[facility]  [address]  [place] [address]  [phone]  Dept: [phone]    PRE-OP EVALUATION:  Today's date: 2018    Erica Ramos (: 1955) presents for pre-operative evaluation assessment as requested by Dr. Esau Hollis.  She requires evaluation and anesthesia risk assessment prior to undergoing surgery/procedure for treatment of cerebral aneurysm.    Proposed Surgery/ Procedure: cerebral angiogram  Date of Surgery/ Procedure:    Time of Surgery/ Procedure: 8am  Hospital/Surgical Facility: [facility]  Fax number for surgical facility:   Primary Physician: Leonela Ibanez  Type of Anesthesia Anticipated: General    Patient has a Health Care Directive or Living Will:  NO    1. NO - DO YOU HAVE A HISTORY OF HEART ATTACK, STROKE, STENT, BYPASS OR SURGERY ON AN ARTERY IN THE HEAD, NECK, HEART OR LEG?   2. NO - Do you ever have any pain or discomfort in your chest?  3. NO - Do you have a history of  Heart Failure?  4. NO - Are you troubled by shortness of breath when: walking on the level, up a slight hill or at night?  5. NO - Do you currently have a cold, bronchitis or other respiratory infection?  6. NO - Do you have a cough, shortness of breath or wheezing?  7. YES - DO YOU SOMETIMES GET PAINS IN THE CALVES OF YOUR LEGS WHEN YOU WALK? RA pain  8. NO - Do you or anyone in your family have previous history of blood clots?  9. Unsure - DO YOU OR DOES ANYONE IN YOUR FAMILY HAVE A SERIOUS BLEEDING PROBLEM SUCH AS PROLONGED BLEEDING FOLLOWING SURGERIES OR CUTS? Unsure  9. NO - Do you or does anyone in your family have a serious bleeding problem such as prolonged bleeding following surgeries or cuts?  10. NO - Have you ever had problems with anemia or been told to take iron pills?  11. NO - Have you had any abnormal blood loss such as black, tarry or bloody stools, or abnormal vaginal bleeding?  12. NO - Have you ever had a blood transfusion?  13. NO - Have  you or any of your relatives ever had problems with anesthesia?  14. NO - Do you have sleep apnea, excessive snoring or daytime drowsiness?  15. NO - Do you have any prosthetic heart valves?  16. NO - Do you have prosthetic joints?  17. NO - Is there any chance that you may be pregnant?      HPI:     HPI related to upcoming procedure: cerebral aneurysm      DEPRESSION - Patient has a long history of Depression of moderate severity requiring medication for control with recent symptoms being stable..Current symptoms of depression include none.                                                                                                                                                                                    .  HYPERLIPIDEMIA - Patient has a long history of significant Hyperlipidemia requiring medication for treatment with recent good control. Patient reports no problems or side effects with the medication.                                                                                                                                                       .  HYPERTENSION - Patient has longstanding history of HTN , currently denies any symptoms referable to elevated blood pressure. Specifically denies chest pain, palpitations, dyspnea, orthopnea, PND or peripheral edema. Blood pressure readings have been in normal range. Current medication regimen is as listed below. Patient denies any side effects of medication.                                                                                                                                                                                          .    MEDICAL HISTORY:     Patient Active Problem List    Diagnosis Date Noted     Cerebral aneurysm 08/24/2018     Priority: Medium     Rheumatoid arthritis with positive rheumatoid factor, involving unspecified site (H) 08/28/2017     Priority: Medium     Anxiety and depression 02/07/2017     Priority: Medium      Hypertension, goal below 140/90 05/13/2016     Priority: Medium     Hyperlipidemia LDL goal <130 01/30/2014     Priority: Medium     Advanced directives, counseling/discussion 08/24/2012     Priority: Medium     Discussed advance care planning with patient; information given to patient to review. 8/24/2012          Obesity 01/03/2012     Priority: Medium      Past Medical History:   Diagnosis Date     Brain aneurysm      Depression, major      Hyperlipidaemia      Past Surgical History:   Procedure Laterality Date     COLONOSCOPY WITH CO2 INSUFFLATION N/A 12/19/2017    Procedure: COLONOSCOPY WITH CO2 INSUFFLATION;  COLON SCREEN/ KNAEBLE;  Surgeon: Romeo Almonte MD;  Location: [facility] OR     D & C      polyps     SURGICAL HISTORY OF - 8-08    aneurysm repair     Current Outpatient Prescriptions   Medication Sig Dispense Refill     adalimumab (HUMIRA) 40 MG/0.8ML prefilled syringe kit Inject 40 mg Subcutaneous       ALPRAZolam (XANAX) 0.5 MG tablet Take 1 tablet (0.5 mg) by mouth 2 times daily as needed for anxiety - use sparingly 20 tablet 2     ASPIRIN ADULT LOW STRENGTH PO        CALCIUM-VITAMIN D PO        leucovorin (WELLCOVORIN) 5 MG tablet        losartan (COZAAR) 25 MG tablet Take 1 tablet (25 mg) by mouth daily 90 tablet 3     methotrexate 2.5 MG tablet CHEMO Take 25 mg by mouth       pravastatin (PRAVACHOL) 20 MG tablet Take 1 tablet (20 mg) by mouth daily 90 tablet 3     predniSONE (DELTASONE) 5 MG tablet TAKE 2 TABLETS BY MOUTH ONCE DAILY WITH A MEAL.  3     sertraline (ZOLOFT) 100 MG tablet Take 1.5 tablets (150 mg) by mouth daily 135 tablet 3     OTC products: None, except as noted above    No Known Allergies   Latex Allergy: NO    Social History   Substance Use Topics     Smoking status: Former Smoker     Quit date: 12/9/2006     Smokeless tobacco: Never Used     Alcohol use No     History   Drug Use No       REVIEW OF SYSTEMS:   Constitutional, neuro, ENT, endocrine, pulmonary, cardiac,  gastrointestinal, genitourinary, musculoskeletal, integument and psychiatric systems are negative, except as otherwise noted.    EXAM:   /76  Pulse 73  Temp 97.1  F (36.2  C)  Resp 18  Wt 199 lb 12.8 oz (90.6 kg)  SpO2 94%  BMI 31.62 kg/m2    GENERAL APPEARANCE: healthy, alert and no distress     EYES: EOMI, PERRL     HENT: ear canals and TM's normal and nose and mouth without ulcers or lesions     NECK: no adenopathy, no asymmetry, masses, or scars and thyroid normal to palpation     RESP: lungs clear to auscultation - no rales, rhonchi or wheezes     CV: regular rates and rhythm, normal S1 S2, no S3 or S4 and no murmur, click or rub     ABDOMEN:  soft, nontender, no HSM or masses and bowel sounds normal     MS: extremities normal- no gross deformities noted, no evidence of inflammation in joints, FROM in all extremities.     SKIN: no suspicious lesions or rashes     NEURO: Normal strength and tone, sensory exam grossly normal, mentation intact and speech normal     PSYCH: mentation appears normal. and affect normal/bright     LYMPHATICS: No cervical adenopathy    DIAGNOSTICS:   EKG: appears normal, NSR, normal axis, normal intervals, no acute ST/T changes c/w ischemia, no LVH by voltage criteria    INR   Date Value Ref Range Status   08/24/2018 0.98 0.86 - 1.14 Final         Lab Results   Component Value Date    WBC 4.5 08/22/2018     Lab Results   Component Value Date    RBC 4.31 08/22/2018     Lab Results   Component Value Date    HGB Canceled, Test credited 08/22/2018    HGB 14.3 08/22/2018     Lab Results   Component Value Date    HCT 42.0 08/22/2018     No components found for: MCT  Lab Results   Component Value Date    MCV 97 08/22/2018     Lab Results   Component Value Date    MCH 33.2 08/22/2018     Lab Results   Component Value Date    MCHC 34.0 08/22/2018     Lab Results   Component Value Date    RDW 13.3 08/22/2018     Lab Results   Component Value Date     08/22/2018       Recent  Labs   Lab Test  08/22/18   0905  08/28/17   1237   HGB  14.3   --    NA  138  139   POTASSIUM  4.0  4.2   CR  0.67  0.64      IMPRESSION:   Reason for surgery/procedure: cerebral aneurysm  Diagnosis/reason for consult: pre op consult    The proposed surgical procedure is considered HIGH risk.    REVISED CARDIAC RISK INDEX  The patient has the following serious cardiovascular risks for perioperative complications such as (MI, PE, VFib and 3  AV Block):  No serious cardiac risks  INTERPRETATION: 0 risks: Class I (very low risk - 0.4% complication rate)    The patient has the following additional risks for perioperative complications:  No identified additional risks      ICD-10-CM    1. Preop general physical exam Z01.818 CBC with platelets differential     INR     CANCELED: INR   2. Cerebral aneurysm I67.1    3. Hypertension, goal below 140/90 I10 EKG 12-lead complete w/read - Clinics       RECOMMENDATIONS:     APPROVAL GIVEN to proceed with proposed procedure, without further diagnostic evaluation       Signed Electronically by: Leonela Ibanez PA-C    Copy of this evaluation report is provided to requesting physician.    [facility] Preop Guidelines    Revised Cardiac Risk Index

## 2018-08-24 NOTE — Clinical Note
Pre op done - surgery on Monday 8/27. Patient would like to  copy of note before clinic closes today. Leonela

## 2018-08-24 NOTE — MR AVS SNAPSHOT
After Visit Summary   8/24/2018    Erica Ramos    MRN: [id number]           Patient Information     Date Of Birth          1955        Visit Information        Provider Department      8/24/2018 10:20 AM Leonela Ibanez PA-C [facility]        Today's Diagnoses     Preop general physical exam    -  1    Cerebral aneurysm        Hypertension, goal below 140/90          Care Instructions      Before Your Surgery      Call your surgeon if there is any change in your health. This includes signs of a cold or flu (such as a sore throat, runny nose, cough, rash or fever).    Do not smoke, drink alcohol or take over the counter medicine (unless your surgeon or primary care doctor tells you to) for the 24 hours before and after surgery.    If you take prescribed drugs: Follow your doctor s orders about which medicines to take and which to stop until after surgery.    Eating and drinking prior to surgery: follow the instructions from your surgeon    Take a shower or bath the night before surgery. Use the soap your surgeon gave you to gently clean your skin. If you do not have soap from your surgeon, use your regular soap. Do not shave or scrub the surgery site.  Wear clean pajamas and have clean sheets on your bed.           Follow-ups after your visit        Who to contact     Normal or non-critical lab and imaging results will be communicated to you by Limeadehart, letter or phone within 4 business days after the clinic has received the results. If you do not hear from us within 7 days, please contact the clinic through Limeadehart or phone. If you have a critical or abnormal lab result, we will notify you by phone as soon as possible.  Submit refill requests through Digitick or call your pharmacy and they will forward the refill request to us. Please allow 3 business days for your refill to be completed.          If you need to speak with a  for additional information ,  please call: [phone]             Additional Information About Your Visit        Care EveryWhere ID     This is your Care EveryWhere ID. This could be used by other organizations to access your [facility] medical records  [id number]        Your Vitals Were     Pulse Temperature Respirations Pulse Oximetry BMI (Body Mass Index)       73 97.1  F (36.2  C) 18 94% 31.62 kg/m2        Blood Pressure from Last 3 Encounters:   08/24/18 131/76   08/22/18 118/76   12/19/17 122/71    Weight from Last 3 Encounters:   08/24/18 199 lb 12.8 oz (90.6 kg)   08/22/18 197 lb 9.6 oz (89.6 kg)   08/28/17 201 lb (91.2 kg)              We Performed the Following     EKG 12-lead complete w/read - Clinics     [facility]        Primary Care Provider Office Phone # Fax #    Leonela Ibanez PA-C [phone]       [address] [facility] [address]        Equal Access to Services     [facility]: Hadii aad ku hadasho Soomaali, waaxda luqadaha, qaybta kaalmada adeegyada, waxay idiin hayaan kaden kirk . So [facility] [phone].    ATENCIÓN: Si habla español, tiene a gordon disposición servicios gratuitos de asistencia lingüística. [facility] [phone].    We comply with applicable federal civil rights laws and [place] laws. We do not discriminate on the basis of race, color, national origin, age, disability, sex, sexual orientation, or gender identity.            Thank you!     Thank you for choosing [facility]  for your care. Our goal is always to provide you with excellent care. Hearing back from our patients is one way we can continue to improve our services. Please take a few minutes to complete the written survey that you may receive in the mail after your visit with us. Thank you!             Your Updated Medication List - Protect others around you: Learn how to safely use, store and throw away your medicines at www.disposemymeds.org.          This list is accurate as of 8/24/18  1:46 PM.  Always use  your most recent med list.                   Brand Name Dispense Instructions for use Diagnosis    ALPRAZolam 0.5 MG tablet    XANAX    20 tablet    Take 1 tablet (0.5 mg) by mouth 2 times daily as needed for anxiety - use sparingly    Anxiety and depression       ASPIRIN ADULT LOW STRENGTH PO           CALCIUM-VITAMIN D PO           HUMIRA 40 MG/0.8ML prefilled syringe kit   Generic drug:  adalimumab      Inject 40 mg Subcutaneous        leucovorin 5 MG tablet    WELLCOVORIN          losartan 25 MG tablet    COZAAR    90 tablet    Take 1 tablet (25 mg) by mouth daily    Hypertension, goal below 140/90       methotrexate 2.5 MG tablet CHEMO      Take 25 mg by mouth        pravastatin 20 MG tablet    PRAVACHOL    90 tablet    Take 1 tablet (20 mg) by mouth daily    Hyperlipidemia LDL goal <130       predniSONE 5 MG tablet    DELTASONE     TAKE 2 TABLETS BY MOUTH ONCE DAILY WITH A MEAL.        sertraline 100 MG tablet    ZOLOFT    135 tablet    Take 1.5 tablets (150 mg) by mouth daily    Anxiety and depression

## 2018-11-09 ENCOUNTER — TELEPHONE (OUTPATIENT)
Dept: AUDIOLOGY | Facility: CLINIC | Age: 63
End: 2018-11-09

## 2018-11-09 NOTE — TELEPHONE ENCOUNTER
Reason for Call:  Other supplies      Detailed comments: patient is needing hearing aid supplies and is asking for a return call to discuss exactly what she needs as she does not know what they are called. Please contact patient to discuss further.    Phone Number Patient can be reached at: Cell number on file:    Telephone Information:   Mobile [phone]       Best Time: any time    Can we leave a detailed message on this number? YES    Call taken on 11/9/2018 at 11:52 AM by Izzy Rosado

## 2018-11-09 NOTE — TELEPHONE ENCOUNTER
Patient also requested some large closed domes be sent along with the medium closed domes and waxguards for her [employer] hearing aids. I informed her that I would include some of the large closed domes along with the rest of the requested supplies.     -Claus Han CCC-A

## 2019-02-14 ENCOUNTER — TELEPHONE (OUTPATIENT)
Dept: FAMILY MEDICINE | Facility: CLINIC | Age: 64
End: 2019-02-14

## 2019-02-14 NOTE — TELEPHONE ENCOUNTER
Spoke with patient and she stated that she had fallen at a gas station last Friday and hit her head on the cement slab.  Patient states she feels okay just bruising and some head discomfort.  Patient coherent.  Patient and  are concerned due to history of intracranial aneurysm.  Patient available to come in today or tomorrow before 1 pm.  Only wants to see Leonela.  Patient requesting to be worked into schedule.

## 2019-02-14 NOTE — TELEPHONE ENCOUNTER
Patient states she fell in a gas station last Friday nite and she feels she is getting better.  She would like to talk to a nurse.  Please call.  Thank You.

## 2019-02-14 NOTE — TELEPHONE ENCOUNTER
SD appt is fine - if any change in mental status (confusion, etc) or neuro changes she needs to get to ER ASAP

## 2019-02-15 NOTE — TELEPHONE ENCOUNTER
"Spoke with patient and below information given.   Patient declined appt, says she feels good and had a school nurse look at her injury spot and reports it looked good appears to be healing and   \"has healing colors in the bruising\".  Offered appt for Monday, which patient declined.  She reports she is doing well and will watch and knows to go to ER if any new symptoms arise, or any symptoms persist.  Deb Green RN    "

## 2019-02-15 NOTE — TELEPHONE ENCOUNTER
We did not know where we could but her, there was not a SD slot before 1 pm?  Please advise, Deb Green RN

## 2019-02-26 ENCOUNTER — OFFICE VISIT (OUTPATIENT)
Dept: AUDIOLOGY | Facility: CLINIC | Age: 64
End: 2019-02-26
Payer: COMMERCIAL

## 2019-02-26 DIAGNOSIS — H90.3 SENSORINEURAL HEARING LOSS, BILATERAL: Primary | ICD-10-CM

## 2019-02-26 PROCEDURE — 92567 TYMPANOMETRY: CPT | Performed by: AUDIOLOGIST

## 2019-02-26 PROCEDURE — 92593 HC HEARING AID CHECK, BINAURAL: CPT | Performed by: AUDIOLOGIST

## 2019-02-26 PROCEDURE — 92557 COMPREHENSIVE HEARING TEST: CPT | Performed by: AUDIOLOGIST

## 2019-02-26 PROCEDURE — 99207 ZZC NO CHARGE LOS: CPT | Performed by: AUDIOLOGIST

## 2019-02-26 NOTE — PROGRESS NOTES
AUDIOLOGY REPORT    SUBJECTIVE:  Erica Ramos is a 63 year old female who was seen in the Audiology Clinic [facility] on 2/26/19 for audiologic evaluation, referred by self.  The patient has been seen previously in this clinic on 1/28/16 for assessment and results indicated mild to moderately severe sloping sensorineural hearing loss. The patient reports a possible decrease in hearing and that she is not hearing as well with her [employer] hearing aids. The patient denies  bilateral tinnitus, bilateral otalgia, bilateral drainage and bilateral aural fullness. The patient notes difficulty with communication in a variety of listening situations.     OBJECTIVE:    Otoscopic exam indicates ears are clear of cerumen bilaterally     Pure Tone Thresholds assessed using standard techniques  audiometry with good  reliability from 250-8000 Hz bilaterally using circumaural headphones     RIGHT:   mild to moderately severe sensorineural hearing loss    LEFT:    mild to moderately severe sensorineural hearing loss    Tympanogram:    RIGHT: restricted eardrum mobility Type As    LEFT:   restricted eardrum mobility Type As        Speech Reception Threshold:    RIGHT: 55 dB HL    LEFT:   50 dB HL    Word Recognition Score:     RIGHT: 88% at 90 dB HL using NU-6 recorded word list.    LEFT:   84% at 90 dB HL using NU-6 recorded word list.      ASSESSMENT:   No diagnosis found.    Compared to patient's previous audiogram dated 1/28/16, hearing has declined slightly in the left ear.  Today s results were discussed with the patient in detail.     I reprogrammed the hearing aids and increased the gain a bit in both ears, then I changed the domes to large closed domes which seemed to fit better without feedback.    PLAN:   It is recommended that the patient follow up as needed for audiology and hearing aid service.  Please call this clinic with questions regarding these results or recommendations.      Kameron  DANNY Benton, CCC-A  [place] Licensed Audiologist #[id number]  2/26/2019

## 2019-07-20 DIAGNOSIS — F32.A ANXIETY AND DEPRESSION: ICD-10-CM

## 2019-07-20 DIAGNOSIS — F41.9 ANXIETY AND DEPRESSION: ICD-10-CM

## 2019-07-22 ENCOUNTER — TRANSFERRED RECORDS (OUTPATIENT)
Dept: HEALTH INFORMATION MANAGEMENT | Facility: CLINIC | Age: 64
End: 2019-07-22

## 2019-07-22 RX ORDER — SERTRALINE HYDROCHLORIDE 100 MG/1
100 TABLET, FILM COATED ORAL DAILY
Qty: 30 TABLET | Refills: 0 | OUTPATIENT
Start: 2019-07-22

## 2019-07-31 ASSESSMENT — PATIENT HEALTH QUESTIONNAIRE - PHQ9: SUM OF ALL RESPONSES TO PHQ QUESTIONS 1-9: 12

## 2019-07-31 NOTE — PATIENT INSTRUCTIONS
Common causes of headaches:  1. Stress/anxiety  2. Dehydration - at least 60+ ounces of water per day recommended  3. Poor posture or tension in the neck/shoulders  4. Poor quality sleep or lack of sleep  5. Sinus congestion/allergies      Preventive Health Recommendations  Female Ages 50 - 64    Yearly exam: See your health care provider every year in order to  o Review health changes.   o Discuss preventive care.    o Review your medicines if your doctor has prescribed any.      Get a Pap test every three years (unless you have an abnormal result and your provider advises testing more often).    If you get Pap tests with HPV test, you only need to test every 5 years, unless you have an abnormal result.     You do not need a Pap test if your uterus was removed (hysterectomy) and you have not had cancer.    You should be tested each year for STDs (sexually transmitted diseases) if you're at risk.     Have a mammogram every 1 to 2 years.    Have a colonoscopy at age 50, or have a yearly FIT test (stool test). These exams screen for colon cancer.      Have a cholesterol test every 5 years, or more often if advised.    Have a diabetes test (fasting glucose) every three years. If you are at risk for diabetes, you should have this test more often.     If you are at risk for osteoporosis (brittle bone disease), think about having a bone density scan (DEXA).    Shots: Get a flu shot each year. Get a tetanus shot every 10 years.    Nutrition:     Eat at least 5 servings of fruits and vegetables each day.    Eat whole-grain bread, whole-wheat pasta and brown rice instead of white grains and rice.    Get adequate Calcium and Vitamin D.     Lifestyle    Exercise at least 150 minutes a week (30 minutes a day, 5 days a week). This will help you control your weight and prevent disease.    Limit alcohol to one drink per day.    No smoking.     Wear sunscreen to prevent skin cancer.     See your dentist every six months for an exam  and cleaning.    See your eye doctor every 1 to 2 years.

## 2019-07-31 NOTE — PROGRESS NOTES
SUBJECTIVE:   CC: Erica Ramos is an 63 year old woman who presents for preventive health visit.     Healthy Habits:    Do you get at least three servings of calcium containing foods daily (dairy, green leafy vegetables, etc.)? yes    Amount of exercise or daily activities, outside of work: Walking in the mornings    Problems taking medications regularly No    Medication side effects: No    Have you had an eye exam in the past two years? yes    Do you see a dentist twice per year? yes    Do you have sleep apnea, excessive snoring or daytime drowsiness?no    Rheumatology labs done through [facility] in [facility]    PROBLEMS TO ADD ON...  Patient informed that anything we discuss that is not related to preventative medicine, may be billed for; patient verbalizes understanding.    Fasting    Needing refills and/or labs for:  Hypertension  Medication side effects? No    Hyperlipidemia  Any dietary restrictions? No If so, what type of restriction?   Medication side effects? No    Depression/Anxiety  Update PHQ/FLORIAN     -------------------------------------    Today's PHQ-2 Score:   PHQ-2 (  [employer]) 2019   Q1: Little interest or pleasure in doing things 1 0   Q2: Feeling down, depressed or hopeless 2 0   PHQ-2 Score 3 0       Abuse: Current or Past(Physical, Sexual or Emotional)- No  Do you feel safe in your environment? Yes    Social History     Tobacco Use     Smoking status: Former Smoker     Last attempt to quit: 2006     Years since quittin.6     Smokeless tobacco: Never Used   Substance Use Topics     Alcohol use: No     If you drink alcohol do you typically have >3 drinks per day or >7 drinks per week? No                     Reviewed orders with patient.  Reviewed health maintenance and updated orders accordingly - Yes  Lab work is in process  Labs reviewed in EPIC    Mammogram Screening: Patient over age 50, mutual decision to screen reflected in health  "maintenance.    Pertinent mammograms are reviewed under the imaging tab.  History of abnormal Pap smear: NO - age 30-65 PAP every 5 years with negative HPV co-testing recommended  PAP / HPV 7/25/2016 7/18/2013 9/2/2010   PAP NIL NIL NIL     Reviewed and updated as needed this visit by clinical staff  Tobacco  Allergies  Meds         Reviewed and updated as needed this visit by Provider        Past Medical History:   Diagnosis Date     Brain aneurysm      Depression, major      Hyperlipidaemia         ROS:  Other than what is noted in the HPI and PMH a complete review of systems is otherwise negative including: Constitutional, HEENT, endocrine, cardiovascular, respiratory, GI/, musculoskeletal, neuro, and psychiatric.     OBJECTIVE:   /73   Pulse 82   Temp 95.9  F (35.5  C) (Tympanic)   Resp 18   Ht 1.684 m (5' 6.3\")   Wt 89.5 kg (197 lb 6.4 oz)   SpO2 94%   BMI 31.57 kg/m    EXAM:  GENERAL: healthy, alert and no distress  EYES: Eyes grossly normal to inspection, PERRL and conjunctivae and sclerae normal  HENT: ear canals and TM's normal, nose and mouth without ulcers or lesions  NECK: no adenopathy, no asymmetry, masses, or scars and thyroid normal to palpation  RESP: lungs clear to auscultation - no rales, rhonchi or wheezes  BREAST: normal without masses, tenderness or nipple discharge and no palpable axillary masses or adenopathy  CV: regular rate and rhythm, normal S1 S2, no S3 or S4, no murmur  ABDOMEN: soft, nontender, no hepatosplenomegaly, no masses and bowel sounds normal   (female): normal female external genitalia, normal urethral meatus, vaginal mucosa pink, moist, well rugated, and normal cervix  MS: no gross musculoskeletal defects noted, no edema  SKIN: no suspicious lesions or rashes. Erythematous, scaly plaque left upper arm  NEURO: Normal strength and tone, mentation intact and speech normal  PSYCH: mentation appears normal, affect normal/bright    ASSESSMENT/PLAN:       " "ICD-10-CM    1. Encounter for routine adult medical exam with abnormal findings Z00.01 HIV Screening     Pap imaged thin layer screen with HPV - recommended age 30 - 65 years (select HPV order below)     HPV High Risk Types DNA Cervical     *MA Screening Digital Bilateral   2. Postmenopausal status Z78.0 DEXA HIP/PELVIS/SPINE - Future   3. Anxiety and depression F41.9 sertraline (ZOLOFT) 100 MG tablet    F32.9    4. Hyperlipidemia LDL goal <130 E78.5 pravastatin (PRAVACHOL) 20 MG tablet   5. Hypertension, goal below 140/90 I10 COMPREHENSIVE METABOLIC PANEL     Albumin Random Urine Quantitative with Creat Ratio     losartan (COZAAR) 25 MG tablet   6. Skin lesion L98.9        1,2) Screenings discussed    3-5) Routine labs today. Meds renewed, Zoloft increased to 150mg every day. She had only been taking 100mg.     6) Follow up in 1 month for a biopsy of the lesion on her arm.    COUNSELING:   Reviewed preventive health counseling, as reflected in patient instructions    Estimated body mass index is 31.57 kg/m  as calculated from the following:    Height as of this encounter: 1.684 m (5' 6.3\").    Weight as of this encounter: 89.5 kg (197 lb 6.4 oz).     reports that she quit smoking about 12 years ago. She has never used smokeless tobacco.    Counseling Resources:  ATP IV Guidelines  Pooled Cohorts Equation Calculator  Breast Cancer Risk Calculator  FRAX Risk Assessment  ICSI Preventive Guidelines  Dietary Guidelines for Americans, 2010  USDA's MyPlate  ASA Prophylaxis  Lung CA Screening    Leonela Ibanez PA-C  [facility]  "

## 2019-08-01 ENCOUNTER — OFFICE VISIT (OUTPATIENT)
Dept: FAMILY MEDICINE | Facility: CLINIC | Age: 64
End: 2019-08-01
Payer: COMMERCIAL

## 2019-08-01 VITALS
HEART RATE: 82 BPM | OXYGEN SATURATION: 94 % | HEIGHT: 66 IN | TEMPERATURE: 95.9 F | SYSTOLIC BLOOD PRESSURE: 121 MMHG | RESPIRATION RATE: 18 BRPM | DIASTOLIC BLOOD PRESSURE: 73 MMHG | WEIGHT: 197.4 LBS | BODY MASS INDEX: 31.72 KG/M2

## 2019-08-01 DIAGNOSIS — Z00.01 ENCOUNTER FOR ROUTINE ADULT MEDICAL EXAM WITH ABNORMAL FINDINGS: Primary | ICD-10-CM

## 2019-08-01 DIAGNOSIS — E78.5 HYPERLIPIDEMIA LDL GOAL <130: ICD-10-CM

## 2019-08-01 DIAGNOSIS — Z78.0 POSTMENOPAUSAL STATUS: ICD-10-CM

## 2019-08-01 DIAGNOSIS — I10 HYPERTENSION, GOAL BELOW 140/90: ICD-10-CM

## 2019-08-01 DIAGNOSIS — F41.9 ANXIETY AND DEPRESSION: ICD-10-CM

## 2019-08-01 DIAGNOSIS — L98.9 SKIN LESION: ICD-10-CM

## 2019-08-01 DIAGNOSIS — F32.A ANXIETY AND DEPRESSION: ICD-10-CM

## 2019-08-01 LAB
ALBUMIN SERPL-MCNC: 4.3 G/DL (ref 3.4–5)
ALP SERPL-CCNC: 41 U/L (ref 40–150)
ALT SERPL W P-5'-P-CCNC: 40 U/L (ref 0–50)
ANION GAP SERPL CALCULATED.3IONS-SCNC: 5 MMOL/L (ref 3–14)
AST SERPL W P-5'-P-CCNC: 34 U/L (ref 0–45)
BILIRUB SERPL-MCNC: 0.5 MG/DL (ref 0.2–1.3)
BUN SERPL-MCNC: 18 MG/DL (ref 7–30)
CALCIUM SERPL-MCNC: 9.1 MG/DL (ref 8.5–10.1)
CHLORIDE SERPL-SCNC: 108 MMOL/L (ref 94–109)
CHOLEST SERPL-MCNC: 201 MG/DL
CO2 SERPL-SCNC: 29 MMOL/L (ref 20–32)
CREAT SERPL-MCNC: 0.61 MG/DL (ref 0.52–1.04)
CREAT UR-MCNC: 136 MG/DL
GFR SERPL CREATININE-BSD FRML MDRD: >90 ML/MIN/{1.73_M2}
GLUCOSE SERPL-MCNC: 93 MG/DL (ref 70–99)
HDLC SERPL-MCNC: 57 MG/DL
LDLC SERPL CALC-MCNC: 124 MG/DL
MICROALBUMIN UR-MCNC: 12 MG/L
MICROALBUMIN/CREAT UR: 8.46 MG/G CR (ref 0–25)
NONHDLC SERPL-MCNC: 144 MG/DL
POTASSIUM SERPL-SCNC: 4.2 MMOL/L (ref 3.4–5.3)
PROT SERPL-MCNC: 7.8 G/DL (ref 6.8–8.8)
SODIUM SERPL-SCNC: 142 MMOL/L (ref 133–144)
TRIGL SERPL-MCNC: 102 MG/DL

## 2019-08-01 PROCEDURE — 36415 COLL VENOUS BLD VENIPUNCTURE: CPT | Performed by: PHYSICIAN ASSISTANT

## 2019-08-01 PROCEDURE — 82043 UR ALBUMIN QUANTITATIVE: CPT | Performed by: PHYSICIAN ASSISTANT

## 2019-08-01 PROCEDURE — 80061 LIPID PANEL: CPT | Performed by: PHYSICIAN ASSISTANT

## 2019-08-01 PROCEDURE — G0123 SCREEN CERV/VAG THIN LAYER: HCPCS | Performed by: PHYSICIAN ASSISTANT

## 2019-08-01 PROCEDURE — 87624 HPV HI-RISK TYP POOLED RSLT: CPT | Performed by: PHYSICIAN ASSISTANT

## 2019-08-01 PROCEDURE — 87389 HIV-1 AG W/HIV-1&-2 AB AG IA: CPT | Performed by: PHYSICIAN ASSISTANT

## 2019-08-01 PROCEDURE — 99214 OFFICE O/P EST MOD 30 MIN: CPT | Mod: 25 | Performed by: PHYSICIAN ASSISTANT

## 2019-08-01 PROCEDURE — 99396 PREV VISIT EST AGE 40-64: CPT | Performed by: PHYSICIAN ASSISTANT

## 2019-08-01 PROCEDURE — 80053 COMPREHEN METABOLIC PANEL: CPT | Performed by: PHYSICIAN ASSISTANT

## 2019-08-01 RX ORDER — LOSARTAN POTASSIUM 25 MG/1
25 TABLET ORAL DAILY
Qty: 90 TABLET | Refills: 3 | Status: SHIPPED | OUTPATIENT
Start: 2019-08-01 | End: 2020-08-19

## 2019-08-01 RX ORDER — PRAVASTATIN SODIUM 20 MG
20 TABLET ORAL DAILY
Qty: 90 TABLET | Refills: 3 | Status: SHIPPED | OUTPATIENT
Start: 2019-08-01 | End: 2020-09-23

## 2019-08-01 RX ORDER — SERTRALINE HYDROCHLORIDE 100 MG/1
150 TABLET, FILM COATED ORAL DAILY
Qty: 135 TABLET | Refills: 3 | Status: SHIPPED | OUTPATIENT
Start: 2019-08-01 | End: 2020-03-09

## 2019-08-01 ASSESSMENT — MIFFLIN-ST. JEOR: SCORE: 1476.9

## 2019-08-01 NOTE — LETTER
August 8, 2019    Erica Ramos  [address]  [place] [address]    Dear ,  This letter is regarding your recent Pap smear (cervical cancer screening) and Human Papillomavirus (HPV) test.  We are happy to inform you that your Pap smear result is normal. Cervical cancer is closely linked with certain types of HPV. Your results showed no evidence of high-risk HPV.  We recommend you have your next PAP smear and HPV test in 5 years.  You will still need to return to the clinic every year for an annual exam and other preventive tests.  If you have additional questions regarding this result, please call our registered nurse, Yaz at [phone].  Sincerely,    Leonela Ibanez PA-C/chau

## 2019-08-01 NOTE — LETTER
August 7, 2019      Erica Ramos  [address]  [place] [address]        Dear ,    We are writing to inform you of your test results.    Overall, your labs look good! Everything is stable.   Your blood sugar is normal - no signs of diabetes.   Your LDL (bad) cholesterol is <130 and therefore at goal - no signs of high cholesterol.   Continue your medications without change.     Resulted Orders   HIV Screening   Result Value Ref Range    HIV Antigen Antibody Combo Nonreactive NR^Nonreactive          Comment:      HIV-1 p24 Ag & HIV-1/HIV-2 Ab Not Detected   COMPREHENSIVE METABOLIC PANEL   Result Value Ref Range    Sodium 142 133 - 144 mmol/L    Potassium 4.2 3.4 - 5.3 mmol/L    Chloride 108 94 - 109 mmol/L    Carbon Dioxide 29 20 - 32 mmol/L    Anion Gap 5 3 - 14 mmol/L    Glucose 93 70 - 99 mg/dL      Comment:      Fasting specimen    Urea Nitrogen 18 7 - 30 mg/dL    Creatinine 0.61 0.52 - 1.04 mg/dL    GFR Estimate >90 >60 mL/min/[1.73_m2]      Comment:      Non  GFR Calc  Starting 12/18/2018, serum creatinine based estimated GFR (eGFR) will be   calculated using the Chronic Kidney Disease Epidemiology Collaboration   (CKD-EPI) equation.      GFR Estimate If Black >90 >60 mL/min/[1.73_m2]      Comment:       GFR Calc  Starting 12/18/2018, serum creatinine based estimated GFR (eGFR) will be   calculated using the Chronic Kidney Disease Epidemiology Collaboration   (CKD-EPI) equation.      Calcium 9.1 8.5 - 10.1 mg/dL    Bilirubin Total 0.5 0.2 - 1.3 mg/dL    Albumin 4.3 3.4 - 5.0 g/dL    Protein Total 7.8 6.8 - 8.8 g/dL    Alkaline Phosphatase 41 40 - 150 U/L    ALT 40 0 - 50 U/L    AST 34 0 - 45 U/L   Lipid panel reflex to direct LDL Fasting   Result Value Ref Range    Cholesterol 201 (H) <200 mg/dL      Comment:      Desirable:       <200 mg/dl    Triglycerides 102 <150 mg/dL      Comment:      Fasting specimen    HDL Cholesterol 57 >49 mg/dL    LDL  Cholesterol Calculated 124 (H) <100 mg/dL      Comment:      Above desirable:  100-129 mg/dl  Borderline High:  130-159 mg/dL  High:             160-189 mg/dL  Very high:       >189 mg/dl      Non HDL Cholesterol 144 (H) <130 mg/dL      Comment:      Above Desirable:  130-159 mg/dl  Borderline high:  160-189 mg/dl  High:             190-219 mg/dl  Very high:       >219 mg/dl     Albumin Random Urine Quantitative with Creat Ratio   Result Value Ref Range    Creatinine Urine 136 mg/dL    Albumin Urine mg/L 12 mg/L    Albumin Urine mg/g Cr 8.46 0 - 25 mg/g Cr       If you have any questions or concerns, please call the clinic at the number listed above.       Sincerely,        Leonela Ibanez PA-C/kongo

## 2019-08-02 LAB — HIV 1+2 AB+HIV1 P24 AG SERPL QL IA: NONREACTIVE

## 2019-08-05 LAB
COPATH REPORT: NORMAL
PAP: NORMAL

## 2019-08-06 NOTE — RESULT ENCOUNTER NOTE
Please send the following letter to the patient:    Nannette,    Overall, your labs look good! Everything is stable.  Your blood sugar is normal - no signs of diabetes.  Your LDL (bad) cholesterol is <130 and therefore at goal - no signs of high cholesterol.   Continue your medications without change.    Please call me with any questions or concerns.          Leonela Ibanez PA-C

## 2019-08-07 LAB
FINAL DIAGNOSIS: NORMAL
HPV HR 12 DNA CVX QL NAA+PROBE: NEGATIVE
HPV16 DNA SPEC QL NAA+PROBE: NEGATIVE
HPV18 DNA SPEC QL NAA+PROBE: NEGATIVE
SPECIMEN DESCRIPTION: NORMAL
SPECIMEN SOURCE CVX/VAG CYTO: NORMAL

## 2019-08-15 ENCOUNTER — TELEPHONE (OUTPATIENT)
Dept: AUDIOLOGY | Facility: CLINIC | Age: 64
End: 2019-08-15

## 2019-08-15 ENCOUNTER — ANCILLARY PROCEDURE (OUTPATIENT)
Dept: MAMMOGRAPHY | Facility: CLINIC | Age: 64
End: 2019-08-15
Attending: PHYSICIAN ASSISTANT
Payer: COMMERCIAL

## 2019-08-15 DIAGNOSIS — Z12.31 VISIT FOR SCREENING MAMMOGRAM: ICD-10-CM

## 2019-08-15 PROCEDURE — 77067 SCR MAMMO BI INCL CAD: CPT | Mod: TC

## 2019-08-15 NOTE — TELEPHONE ENCOUNTER
Reason for Call:  Other call back    Detailed comments: Patient stopped by the office wanting to get wax domes for her hearing aids noting that she is wanting a larger size. Please call and confirm patient is wanting them mailed to her. Thank you     Phone Number Patient can be reached at: Home number on file [phone] (home)    Best Time: any    Can we leave a detailed message on this number? YES    Call taken on 8/15/2019 at 8:39 AM by Josee Cancino

## 2019-08-15 NOTE — TELEPHONE ENCOUNTER
I informed the patient that the large closed domes for her [employer] hearing aids would be placed in the mail per her request.     -Claus Han CCC-A

## 2019-09-03 ENCOUNTER — OFFICE VISIT (OUTPATIENT)
Dept: FAMILY MEDICINE | Facility: CLINIC | Age: 64
End: 2019-09-03
Payer: COMMERCIAL

## 2019-09-03 VITALS
DIASTOLIC BLOOD PRESSURE: 83 MMHG | RESPIRATION RATE: 18 BRPM | TEMPERATURE: 96.8 F | HEART RATE: 74 BPM | WEIGHT: 197 LBS | OXYGEN SATURATION: 95 % | SYSTOLIC BLOOD PRESSURE: 136 MMHG | BODY MASS INDEX: 31.51 KG/M2

## 2019-09-03 DIAGNOSIS — D48.5 NEOPLASM OF UNCERTAIN BEHAVIOR OF SKIN OF UPPER ARM: Primary | ICD-10-CM

## 2019-09-03 PROCEDURE — 11104 PUNCH BX SKIN SINGLE LESION: CPT | Performed by: PHYSICIAN ASSISTANT

## 2019-09-03 PROCEDURE — 88305 TISSUE EXAM BY PATHOLOGIST: CPT | Performed by: PHYSICIAN ASSISTANT

## 2019-09-03 NOTE — LETTER
"September 9, 2019      Erica Anderson  [address]  [place] [address]        Dear ,    We are writing to inform you of your test results.    Your biopsy shows a benign (non cancerous) skin lesion. If it is bothersome I could freeze it the next time you're in.     Resulted Orders   Surgical pathology exam   Result Value Ref Range    Copath Report       Patient Name: ERICA ANDERSON  MR#: [id number]  Specimen #: [id number]  Collected: 9/3/2019  Received: 9/4/2019  Reported: 9/5/2019 13:52  Ordering Phy(s): KARTHIK CADET    For improved result formatting, select 'View Enhanced Report Format' under   Linked Documents section.    SPECIMEN(S):  Left arm    FINAL DIAGNOSIS:  Left arm:  - Seborrheic keratosis.    Electronically signed out by:    DEEP Irizarry M.D.    CLINICAL HISTORY:  64-year-old female. Skin lesion on left forearm.  Rule out SCC and BCC.    GROSS:  The specimen is received in formalin, labeled with the patient's name and   date of birth, and designated \"skin  left arm\". It consists of an unoriented, 0.3 x 0.2 cm lightly pigmented   skin punch, excised to a depth of 0.4  cm.  The cutaneous surface is tan-white and smooth.  The resection margin   is inked black.  The specimen is  submitted entirely in one cassette. (Dictated by: AUSTIN Guy([facility])   9/4/2019 11:02 AM)    MICROSCOPIC:  Microscopic examinatio n was performed. (Dictated by: UNRULY Irizarry MD   09/05/2019)    The technical component of this testing was completed at the [facility], with the professional component performed   at the [facility], [address] [place] [id number] ([phone])    CPT Codes:  A: 12980-TE9    COLLECTION SITE:  Client: [facility]  Location: [facility] (B)         If you have any questions or concerns, please call " the clinic at the number listed above.       Sincerely,        Leonela Ibanez PA-C/ernestine

## 2019-09-03 NOTE — PROGRESS NOTES
PROCEDURE NOTE:    Subjective: Erica is a 64 year old female who presents today for lesion biopsy. The lesion is located on the upper arm,  and measures 5mm x 12mm.  She denies other significant symptoms on ROS.     Objective: The area was prepped and appropriately anesthetized with 0.5cc of 1% lidocaine with epinephrine. Using the usual technique, full thickness punch biopsy was performed. A 3mm punch was used at the edge of the lesion. Closure with 1 simple interrupted sutures using 5-0 Ethilon suture. An appropriate dressing was applied.  The procedure was well tolerated and without complications. Specimen was sent.    Assessment:     1. Neoplasm of uncertain behavior of skin of upper arm         Plan: The specimen is labelled and sent to pathology for evaluation., Return for suture removal in 7-10 days. Wound care instructions provided.  Patient was instructed to be alert for any signs of cutaneous infection.    Leonela Ibanez PA-C

## 2019-09-03 NOTE — PATIENT INSTRUCTIONS
WOULD CARE INSTRUCTIONS:    No water exposure for 24 hours - it takes 24 hours for a barrier to form to keep out bacteria. After 24 hours you can shower, but no submerging (baths, pools, etc) the sutures until they come out.    Wash your hands with soap and warm water before and after caring for the wound. This helps prevent infection.    Apply a thin layer of neosporin/bacitracin 1 time daily with a new bandage for 5-7 days. After one week, switch to Vaseline once daily with a new bandage as needed.    If at any time the bandage becomes wet or dirty, replace it with a new one.    Care/Instructions For Specific Closures:    Sutures: Sutures on the outside of the skin will need to be removed by your healthcare provider in 7-10 days.    SIGNS OF INFECTION:    Increased redness    Increased tenderness    Warmth to the area    Pus-like drainage    Fever/chills    There are a few simple things that you can do to limit the amount of scar that forms:  1) PREVENT INFECTION: An infected wound makes a bigger scar. Keep the wound clean and dry. Change the dressing and apply any ointment/cream as directed.  2) MASSAGE THE WOUND: After the stitches have been removed: Use a moisturizing cream or lotion containing Aloe or Vitamin E Oil and gently massage the skin around the wound with your fingertips (wash your hands first!). Do this twice a day for the first two weeks, then once a day for a month. This will increase the flow of oxygen and blood to the wound and prevent excess scar tissue from building up.   3) AVOID SUN EXPOSURE: During the first six months, avoid sun exposure since the scar may tan a much darker color than the skin around it. When in the sun, use SPF #50 (or greater) sun block on the scar, or cover the area with a hat or clothing.

## 2019-09-05 LAB — COPATH REPORT: NORMAL

## 2019-09-09 NOTE — RESULT ENCOUNTER NOTE
Please send the following letter to the patient:    Nannette,    Your biopsy shows a benign (non cancerous) skin lesion. If it is bothersome I could freeze it the next time you're in.    Please call me with any questions or concerns.          Leonela Ibanez PA-C

## 2019-09-10 ENCOUNTER — ALLIED HEALTH/NURSE VISIT (OUTPATIENT)
Dept: NURSING | Facility: CLINIC | Age: 64
End: 2019-09-10
Payer: COMMERCIAL

## 2019-09-10 DIAGNOSIS — Z48.02 ENCOUNTER FOR REMOVAL OF SUTURES: Primary | ICD-10-CM

## 2019-09-10 PROCEDURE — 99207 ZZC NO CHARGE NURSE ONLY: CPT

## 2019-09-10 NOTE — PROGRESS NOTES
Erica Ramos presents to the clinic for removal of suture. The patient has had suture in place for 7 days. There has been no patient reported signs or symptoms of infection or drainage. 1  suture are seen and located on the Left Arm. Tetanus status is up to date. Suture was easily removed today. The patient will follow up as needed.    Megan Velazquez, CMA

## 2019-10-11 NOTE — TELEPHONE ENCOUNTER
"Requested Prescriptions   Pending Prescriptions Disp Refills     sertraline (ZOLOFT) 100 MG tablet [Pharmacy Med Name: SERTRALINE  MG TABLET]  Last Written Prescription Date:  04/23/19  Last Fill Quantity: 90,  # refills: 3   Last office visit: 8/24/2018 with prescribing provider:  ELIGIO Ibanez   Future Office Visit:     90 tablet 3     Sig: TAKE 1 TABLET (100 MG) BY MOUTH DAILY - DUE FOR ANNUAL FOLLOW UP AND LABS       SSRIs Protocol Passed - 7/20/2019  8:28 AM   FLORIAN-7 SCORE 7/25/2016 3/21/2017 8/22/2018   Total Score - - -   Total Score 0 3 8     PHQ-9 SCORE 7/25/2016 3/21/2017 8/14/2018   PHQ-9 Total Score - - -   PHQ-9 Total Score 0 5 5        Passed - Recent (12 mo) or future (30 days) visit within the authorizing provider's specialty     Patient had office visit in the last 12 months or has a visit in the next 30 days with authorizing provider or within the authorizing provider's specialty.  See \"Patient Info\" tab in inbasket, or \"Choose Columns\" in Meds & Orders section of the refill encounter.              Passed - Medication is active on med list        Passed - Patient is age 18 or older        Passed - No active pregnancy on record        Passed - No positive pregnancy test in last 12 months          "
[facility] sent [facility] refill request for Sertraline 100 mg.   Patient states that she does not need this refill at this time. This refill request has been refused.     Appointment scheduled for physical on 8/1/19. Patient plans to check with her insurance to find out if she can have her fasting labs drawn that same day.   
patient

## 2019-10-29 ENCOUNTER — TELEPHONE (OUTPATIENT)
Dept: FAMILY MEDICINE | Facility: CLINIC | Age: 64
End: 2019-10-29

## 2019-10-29 NOTE — TELEPHONE ENCOUNTER
Patient states her blood pressure is all over.  Yesterday it was 148/90, the day before was 167/90 and today 132/80.  She gets a lot of headaches.  Please call.   Caller informed calls received after 3 pm may not be returned until following day.

## 2019-10-29 NOTE — TELEPHONE ENCOUNTER
Patient has concerns about BP and current medication - would like to see provider about this.   Appointment scheduled.   No further questions at this time.    Ita Lockwood, RN, BSN, PHN

## 2019-10-30 ENCOUNTER — OFFICE VISIT (OUTPATIENT)
Dept: FAMILY MEDICINE | Facility: CLINIC | Age: 64
End: 2019-10-30
Payer: COMMERCIAL

## 2019-10-30 VITALS
HEART RATE: 69 BPM | OXYGEN SATURATION: 98 % | DIASTOLIC BLOOD PRESSURE: 74 MMHG | WEIGHT: 193.4 LBS | TEMPERATURE: 96.8 F | SYSTOLIC BLOOD PRESSURE: 144 MMHG | BODY MASS INDEX: 30.93 KG/M2 | RESPIRATION RATE: 20 BRPM

## 2019-10-30 DIAGNOSIS — Z78.0 POSTMENOPAUSAL STATUS: ICD-10-CM

## 2019-10-30 DIAGNOSIS — R23.2 FLUSHING: Primary | ICD-10-CM

## 2019-10-30 DIAGNOSIS — R68.89 HEAT INTOLERANCE: ICD-10-CM

## 2019-10-30 LAB
BASOPHILS # BLD AUTO: 0 10E9/L (ref 0–0.2)
BASOPHILS NFR BLD AUTO: 0.7 %
DIFFERENTIAL METHOD BLD: NORMAL
EOSINOPHIL # BLD AUTO: 0.2 10E9/L (ref 0–0.7)
EOSINOPHIL NFR BLD AUTO: 2.9 %
ERYTHROCYTE [DISTWIDTH] IN BLOOD BY AUTOMATED COUNT: 13 % (ref 10–15)
HCT VFR BLD AUTO: 42.1 % (ref 35–47)
HGB BLD-MCNC: 14.2 G/DL (ref 11.7–15.7)
LYMPHOCYTES # BLD AUTO: 2.1 10E9/L (ref 0.8–5.3)
LYMPHOCYTES NFR BLD AUTO: 35.9 %
MCH RBC QN AUTO: 32.8 PG (ref 26.5–33)
MCHC RBC AUTO-ENTMCNC: 33.7 G/DL (ref 31.5–36.5)
MCV RBC AUTO: 97 FL (ref 78–100)
MONOCYTES # BLD AUTO: 0.7 10E9/L (ref 0–1.3)
MONOCYTES NFR BLD AUTO: 11.3 %
NEUTROPHILS # BLD AUTO: 2.9 10E9/L (ref 1.6–8.3)
NEUTROPHILS NFR BLD AUTO: 49.2 %
PLATELET # BLD AUTO: 303 10E9/L (ref 150–450)
RBC # BLD AUTO: 4.33 10E12/L (ref 3.8–5.2)
TSH SERPL DL<=0.005 MIU/L-ACNC: 1.52 MU/L (ref 0.4–4)
WBC # BLD AUTO: 5.9 10E9/L (ref 4–11)

## 2019-10-30 PROCEDURE — 85025 COMPLETE CBC W/AUTO DIFF WBC: CPT | Performed by: PHYSICIAN ASSISTANT

## 2019-10-30 PROCEDURE — 36415 COLL VENOUS BLD VENIPUNCTURE: CPT | Performed by: PHYSICIAN ASSISTANT

## 2019-10-30 PROCEDURE — 84443 ASSAY THYROID STIM HORMONE: CPT | Performed by: PHYSICIAN ASSISTANT

## 2019-10-30 PROCEDURE — 99213 OFFICE O/P EST LOW 20 MIN: CPT | Performed by: PHYSICIAN ASSISTANT

## 2019-10-30 NOTE — PROGRESS NOTES
Subjective     Erica Ramos is a 64 year old female who presents to clinic today for the following health issues:    HPI   Hypertension Follow-up      Do you check your blood pressure regularly outside of the clinic? Yes     Are you following a low salt diet? Yes    Are your blood pressures ever more than 140 on the top number (systolic) OR more   than 90 on the bottom number (diastolic), for example 140/90? Yes      How many servings of fruits and vegetables do you eat daily?  2-3    On average, how many sweetened beverages do you drink each day (soda, juice, sweet tea, etc)?   0    How many days per week do you miss taking your medication? 0      PROBLEMS TO ADD ON...  Patient has been feeling hot, red face. Not sure if related to medications.  Started 2-3 weeks ago?  Has always run a bit warm, can wear flip flops in the winter  Facial flushing and redness is new  Denies chest pain, BONILLA, itching  Had 2 vaccines and knee injections on 10/15  -------------------------------------      Patient Active Problem List   Diagnosis     Obesity     Advanced directives, counseling/discussion     Hyperlipidemia LDL goal <130     Hypertension, goal below 140/90     Anxiety and depression     Rheumatoid arthritis with positive rheumatoid factor, involving unspecified site (H)     Cerebral aneurysm     Past Surgical History:   Procedure Laterality Date     COLONOSCOPY WITH CO2 INSUFFLATION N/A 2017    Procedure: COLONOSCOPY WITH CO2 INSUFFLATION;  COLON SCREEN/ KNAEBLE;  Surgeon: Romeo Almonte MD;  Location: [facility] OR     D & C      polyps     SURGICAL HISTORY OF     aneurysm repair       Social History     Tobacco Use     Smoking status: Former Smoker     Last attempt to quit: 2006     Years since quittin.8     Smokeless tobacco: Never Used   Substance Use Topics     Alcohol use: No     Family History   Problem Relation Age of Onset     Cancer Mother         non hodgkins lymphoma     C.A.D.  "Father      Hypertension Father      Cerebrovascular Disease Father      Hypertension Brother      Cancer Daughter         lymphoma           Reviewed and updated as needed this visit by Provider         Review of Systems   ROS COMP: Constitutional, cardiovascular, skin, endocrine and psych systems are negative, except as otherwise noted.      Objective    BP (!) 144/74   Pulse 69   Temp 96.8  F (36  C) (Tympanic)   Resp 20   Wt 87.7 kg (193 lb 6.4 oz)   SpO2 98%   BMI 30.93 kg/m    Body mass index is 30.93 kg/m .  Physical Exam   GENERAL: healthy, alert and no distress  MS: no gross musculoskeletal defects noted, no edema  SKIN: no suspicious lesions or rashes  NEURO: Normal strength and tone, mentation intact and speech normal  PSYCH: mentation appears normal, affect normal/bright        Assessment & Plan   Assessment  1. Flushing    2. Heat intolerance    3. Postmenopausal status         Plan  1,2) Will check a TSH and CBC today. Her body may be reacting to multiple injections done on 10/15. If labs are normal, will continue to monitor over the next month or so.      BMI:   Estimated body mass index is 30.93 kg/m  as calculated from the following:    Height as of 8/1/19: 1.684 m (5' 6.3\").    Weight as of this encounter: 87.7 kg (193 lb 6.4 oz).     Return in about 10 months (around 8/30/2020) for your annual physical, repeat labs.    Leonela Ibanez PA-C  [facility]          "

## 2019-11-13 ENCOUNTER — ALLIED HEALTH/NURSE VISIT (OUTPATIENT)
Dept: NURSING | Facility: CLINIC | Age: 64
End: 2019-11-13
Payer: COMMERCIAL

## 2019-11-13 VITALS — OXYGEN SATURATION: 96 % | HEART RATE: 80 BPM | DIASTOLIC BLOOD PRESSURE: 77 MMHG | SYSTOLIC BLOOD PRESSURE: 132 MMHG

## 2019-11-13 DIAGNOSIS — I10 HYPERTENSION, GOAL BELOW 140/90: Primary | ICD-10-CM

## 2019-11-13 PROCEDURE — 99207 ZZC NO CHARGE NURSE ONLY: CPT

## 2019-11-13 NOTE — PROGRESS NOTES
Erica Ramos is a 64 year old patient who comes in today for a Blood Pressure check.  Initial /77   Pulse 80   SpO2 96%      Disposition: results routed to provider    Megan Velazquez CMA

## 2019-12-05 ENCOUNTER — TELEPHONE (OUTPATIENT)
Dept: FAMILY MEDICINE | Facility: CLINIC | Age: 64
End: 2019-12-05

## 2019-12-05 NOTE — TELEPHONE ENCOUNTER
Reason for Call:  Other     Detailed comments: Patient was summoned for jury duty, would like a note stating she is unable to due to having to care for her daughter. States the same letter was written two years ago. Would like to  ASA.    Phone Number Patient can be reached at: Home number on file [phone] (home)    Best Time:     Can we leave a detailed message on this number? YES    Call taken on 12/5/2019 at 10:45 AM by Malka Galvin

## 2019-12-05 NOTE — LETTER
December 6, 2019      Erica Ramos  [address] [facility]  [place] [address]        To Whom it may concern,    RE: Erica Ramos, juror # [phone]       Erica is under my care as a patient. Her daughter has a history of a learning disability and seizures. She is unable to leave her daughter alone. Patient is scheduled for jury duty on Monday 12/30/19, but has been unable to find someone to care for her daughter while she's away. Please excuse her from jury duty.    Sincerely,        Leonela Ibanez PA-C

## 2020-03-09 ENCOUNTER — OFFICE VISIT (OUTPATIENT)
Dept: FAMILY MEDICINE | Facility: CLINIC | Age: 65
End: 2020-03-09
Payer: COMMERCIAL

## 2020-03-09 VITALS
BODY MASS INDEX: 31.7 KG/M2 | OXYGEN SATURATION: 95 % | SYSTOLIC BLOOD PRESSURE: 133 MMHG | TEMPERATURE: 97.7 F | WEIGHT: 198.2 LBS | DIASTOLIC BLOOD PRESSURE: 85 MMHG | HEART RATE: 75 BPM

## 2020-03-09 DIAGNOSIS — Z78.0 POSTMENOPAUSAL STATUS: ICD-10-CM

## 2020-03-09 DIAGNOSIS — F32.A ANXIETY AND DEPRESSION: Primary | ICD-10-CM

## 2020-03-09 DIAGNOSIS — F41.9 ANXIETY AND DEPRESSION: Primary | ICD-10-CM

## 2020-03-09 PROCEDURE — 99213 OFFICE O/P EST LOW 20 MIN: CPT | Performed by: PHYSICIAN ASSISTANT

## 2020-03-09 RX ORDER — FLUOXETINE 40 MG/1
40 CAPSULE ORAL DAILY
Qty: 90 CAPSULE | Refills: 0 | Status: SHIPPED | OUTPATIENT
Start: 2020-03-09 | End: 2020-05-05

## 2020-03-09 ASSESSMENT — ANXIETY QUESTIONNAIRES
7. FEELING AFRAID AS IF SOMETHING AWFUL MIGHT HAPPEN: SEVERAL DAYS
GAD7 TOTAL SCORE: 8
6. BECOMING EASILY ANNOYED OR IRRITABLE: SEVERAL DAYS
1. FEELING NERVOUS, ANXIOUS, OR ON EDGE: MORE THAN HALF THE DAYS
5. BEING SO RESTLESS THAT IT IS HARD TO SIT STILL: SEVERAL DAYS
3. WORRYING TOO MUCH ABOUT DIFFERENT THINGS: SEVERAL DAYS
IF YOU CHECKED OFF ANY PROBLEMS ON THIS QUESTIONNAIRE, HOW DIFFICULT HAVE THESE PROBLEMS MADE IT FOR YOU TO DO YOUR WORK, TAKE CARE OF THINGS AT HOME, OR GET ALONG WITH OTHER PEOPLE: NOT DIFFICULT AT ALL
2. NOT BEING ABLE TO STOP OR CONTROL WORRYING: SEVERAL DAYS

## 2020-03-09 ASSESSMENT — PATIENT HEALTH QUESTIONNAIRE - PHQ9
SUM OF ALL RESPONSES TO PHQ QUESTIONS 1-9: 15
5. POOR APPETITE OR OVEREATING: SEVERAL DAYS

## 2020-03-09 NOTE — PROGRESS NOTES
Subjective     Erica Ramos is a 64 year old female who presents to clinic today for the following health issues:    HPI   Depression and Anxiety Follow-Up    How are you doing with your depression since your last visit? Worsened     How are you doing with your anxiety since your last visit?  Worsened     Are you having other symptoms that might be associated with depression or anxiety? Yes:  upset stomach    Have you had a significant life event? Health Concerns     Do you have any concerns with your use of alcohol or other drugs? No     Would like to switch medications  Zoloft not working well  Getting frequent headaches  More frequent anxiety      Social History     Tobacco Use     Smoking status: Former Smoker     Last attempt to quit: 2006     Years since quittin.2     Smokeless tobacco: Never Used   Substance Use Topics     Alcohol use: No     Drug use: No     PHQ 2018 2019 3/9/2020   PHQ-9 Total Score 5 12 15   Q9: Thoughts of better off dead/self-harm past 2 weeks Not at all Not at all Not at all     FLORIAN-7 SCORE 3/21/2017 2018 3/9/2020   Total Score - - -   Total Score 3 8 8       Suicide Assessment Five-step Evaluation and Treatment (SAFE-T)      How many servings of fruits and vegetables do you eat daily?  4 or more    On average, how many sweetened beverages do you drink each day (Examples: soda, juice, sweet tea, etc.  Do NOT count diet or artificially sweetened beverages)?   0    How many days per week do you exercise enough to make your heart beat faster? none    How many minutes a day do you exercise enough to make your heart beat faster? none  How many days per week do you miss taking your medication? 1    What makes it hard for you to take your medications?  remembering to take      Patient Active Problem List   Diagnosis     Obesity     Advanced directives, counseling/discussion     Hyperlipidemia LDL goal <130     Hypertension, goal below 140/90     Anxiety and  depression     Rheumatoid arthritis with positive rheumatoid factor, involving unspecified site (H)     Cerebral aneurysm     Past Surgical History:   Procedure Laterality Date     COLONOSCOPY WITH CO2 INSUFFLATION N/A 2017    Procedure: COLONOSCOPY WITH CO2 INSUFFLATION;  COLON SCREEN/ KNAEBLE;  Surgeon: Romeo Almonte MD;  Location: [facility] OR     D & C      polyps     SURGICAL HISTORY OF     aneurysm repair       Social History     Tobacco Use     Smoking status: Former Smoker     Last attempt to quit: 2006     Years since quittin.2     Smokeless tobacco: Never Used   Substance Use Topics     Alcohol use: No     Family History   Problem Relation Age of Onset     Cancer Mother         non hodgkins lymphoma     C.A.D. Father      Hypertension Father      Cerebrovascular Disease Father      Hypertension Brother      Cancer Daughter         lymphoma           Reviewed and updated as needed this visit by Provider         Review of Systems   ROS COMP: Constitutional, and psych systems are negative, except as otherwise noted.      Objective    /85   Pulse 75   Temp 97.7  F (36.5  C) (Tympanic)   Wt 89.9 kg (198 lb 3.2 oz)   SpO2 95%   BMI 31.70 kg/m    Body mass index is 31.7 kg/m .  Physical Exam   Constitutional: healthy, alert, active, no distress.    Head: Normocephalic  Musculoskeletal: extremities normal- no gross deformities noted, gait normal, normal muscle tone and able to move about the exam room without difficulty.    Skin: no suspicious lesions or rashes appreciated on exposed areas  Neurologic: Gait normal. Moving all extremities spontaneously, no apparent weakness.    Psychiatric: mentation appears normal, thoughts are clear and concise. Converses appropriately. Affect is Appropriate/mood-congruent          Assessment & Plan   Assessment  1. Anxiety and depression    2. Postmenopausal status         Plan  1) Will switch to Prozac 40mg every day. Will repeat her PHQ/FLORIAN  "over the phone in 1 month and will adjust her dose if needed.       BMI:   Estimated body mass index is 30.93 kg/m  as calculated from the following:    Height as of 8/1/19: 1.684 m (5' 6.3\").    Weight as of 10/30/19: 87.7 kg (193 lb 6.4 oz).       Return in about 1 month (around 4/9/2020) for repeat PHQ/FLORIAN over the phone.    Leonela Ibanez PA-C  [facility]          "

## 2020-03-10 ASSESSMENT — ANXIETY QUESTIONNAIRES: GAD7 TOTAL SCORE: 8

## 2020-04-16 ENCOUNTER — TRANSFERRED RECORDS (OUTPATIENT)
Dept: HEALTH INFORMATION MANAGEMENT | Facility: CLINIC | Age: 65
End: 2020-04-16

## 2020-05-03 DIAGNOSIS — F41.9 ANXIETY AND DEPRESSION: ICD-10-CM

## 2020-05-03 DIAGNOSIS — F32.A ANXIETY AND DEPRESSION: ICD-10-CM

## 2020-05-05 ENCOUNTER — VIRTUAL VISIT (OUTPATIENT)
Dept: FAMILY MEDICINE | Facility: CLINIC | Age: 65
End: 2020-05-05
Payer: COMMERCIAL

## 2020-05-05 DIAGNOSIS — F32.A ANXIETY AND DEPRESSION: ICD-10-CM

## 2020-05-05 DIAGNOSIS — F41.9 ANXIETY AND DEPRESSION: ICD-10-CM

## 2020-05-05 PROCEDURE — 99214 OFFICE O/P EST MOD 30 MIN: CPT | Mod: TEL | Performed by: PHYSICIAN ASSISTANT

## 2020-05-05 RX ORDER — FLUOXETINE 40 MG/1
CAPSULE ORAL
Qty: 30 CAPSULE | Refills: 0 | OUTPATIENT
Start: 2020-05-05

## 2020-05-05 ASSESSMENT — ANXIETY QUESTIONNAIRES
7. FEELING AFRAID AS IF SOMETHING AWFUL MIGHT HAPPEN: NOT AT ALL
1. FEELING NERVOUS, ANXIOUS, OR ON EDGE: NEARLY EVERY DAY
3. WORRYING TOO MUCH ABOUT DIFFERENT THINGS: NOT AT ALL
5. BEING SO RESTLESS THAT IT IS HARD TO SIT STILL: NOT AT ALL
IF YOU CHECKED OFF ANY PROBLEMS ON THIS QUESTIONNAIRE, HOW DIFFICULT HAVE THESE PROBLEMS MADE IT FOR YOU TO DO YOUR WORK, TAKE CARE OF THINGS AT HOME, OR GET ALONG WITH OTHER PEOPLE: SOMEWHAT DIFFICULT
6. BECOMING EASILY ANNOYED OR IRRITABLE: NEARLY EVERY DAY
2. NOT BEING ABLE TO STOP OR CONTROL WORRYING: SEVERAL DAYS
GAD7 TOTAL SCORE: 7

## 2020-05-05 ASSESSMENT — PATIENT HEALTH QUESTIONNAIRE - PHQ9
5. POOR APPETITE OR OVEREATING: NOT AT ALL
SUM OF ALL RESPONSES TO PHQ QUESTIONS 1-9: 15

## 2020-05-05 NOTE — PROGRESS NOTES
"Erica Ramos is a 64 year old female who is being evaluated via a billable telephone visit.      The patient has been notified of following:     \"This telephone visit will be conducted via a call between you and your physician/provider. We have found that certain health care needs can be provided without the need for a physical exam.  This service lets us provide the care you need with a short phone conversation.  If a prescription is necessary we can send it directly to your pharmacy.  If lab work is needed we can place an order for that and you can then stop by our lab to have the test done at a later time.    Telephone visits are billed at different rates depending on your insurance coverage. During this emergency period, for some insurers they may be billed the same as an in-person visit.  Please reach out to your insurance provider with any questions.    If during the course of the call the physician/provider feels a telephone visit is not appropriate, you will not be charged for this service.\"    Patient has given verbal consent for Telephone visit?  Yes    What phone number would you like to be contacted at? [phone]    How would you like to obtain your AVS? E-Mail (inform patient AVS not encrypted) [email or web address]    Subjective     Erica Ramos is a 64 year old female who presents to clinic today for the following health issues:    HPI  Depression and Anxiety Follow-Up    How are you doing with your depression since your last visit? No change    How are you doing with your anxiety since your last visit?  No change    Are you having other symptoms that might be associated with depression or anxiety? No    Have you had a significant life event? Relationship Concerns     Do you have any concerns with your use of alcohol or other drugs? No     No real change since switching meds  Anxiety is through the roof   broke his foot/ankle - at home, off work      Social History     Tobacco " Use     Smoking status: Former Smoker     Last attempt to quit: 2006     Years since quittin.4     Smokeless tobacco: Never Used   Substance Use Topics     Alcohol use: No     Drug use: No     PHQ 2019 3/9/2020 2020   PHQ-9 Total Score 12 15 15   Q9: Thoughts of better off dead/self-harm past 2 weeks Not at all Not at all Not at all     FLORIAN-7 SCORE 2018 3/9/2020 2020   Total Score - - -   Total Score 8 8 7     Last PHQ-9 2020   1.  Little interest or pleasure in doing things 3   2.  Feeling down, depressed, or hopeless 3   3.  Trouble falling or staying asleep, or sleeping too much 3   4.  Feeling tired or having little energy 3   5.  Poor appetite or overeating 1   6.  Feeling bad about yourself 0   7.  Trouble concentrating 2   8.  Moving slowly or restless 0   Q9: Thoughts of better off dead/self-harm past 2 weeks 0   PHQ-9 Total Score 15   Difficulty at work, home, or with people Somewhat difficult     FLORIAN-7  2020   1. Feeling nervous, anxious, or on edge 3   2. Not being able to stop or control worrying 1   3. Worrying too much about different things 0   4. Trouble relaxing 0   5. Being so restless that it is hard to sit still 0   6. Becoming easily annoyed or irritable 3   7. Feeling afraid, as if something awful might happen 0   FLORIAN-7 Total Score 7   If you checked any problems, how difficult have they made it for you to do your work, take care of things at home, or get along with other people? Somewhat difficult     Suicide Assessment Five-step Evaluation and Treatment (SAFE-T)      How many servings of fruits and vegetables do you eat daily?  2-3    On average, how many sweetened beverages do you drink each day (Examples: soda, juice, sweet tea, etc.  Do NOT count diet or artificially sweetened beverages)?   2    How many days per week do you exercise enough to make your heart beat faster? 5    How many minutes a day do you exercise enough to make your heart beat faster?  30 - 60    How many days per week do you miss taking your medication? 0         Patient Active Problem List   Diagnosis     Obesity     Advanced directives, counseling/discussion     Hyperlipidemia LDL goal <130     Hypertension, goal below 140/90     Anxiety and depression     Rheumatoid arthritis with positive rheumatoid factor, involving unspecified site (H)     Cerebral aneurysm     Past Surgical History:   Procedure Laterality Date     COLONOSCOPY WITH CO2 INSUFFLATION N/A 2017    Procedure: COLONOSCOPY WITH CO2 INSUFFLATION;  COLON SCREEN/ KNAEBLE;  Surgeon: Romeo Almonte MD;  Location: [facility] OR     D & C      polyps     SURGICAL HISTORY OF     aneurysm repair       Social History     Tobacco Use     Smoking status: Former Smoker     Last attempt to quit: 2006     Years since quittin.4     Smokeless tobacco: Never Used   Substance Use Topics     Alcohol use: No     Family History   Problem Relation Age of Onset     Cancer Mother         non hodgkins lymphoma     C.A.D. Father      Hypertension Father      Cerebrovascular Disease Father      Hypertension Brother      Cancer Daughter         lymphoma           Reviewed and updated as needed this visit by Provider         Review of Systems   ROS COMP: Constitutional, and psych systems are negative, except as otherwise noted.       Objective   Reported vitals:  There were no vitals taken for this visit.   healthy, alert and no distress  PSYCH: Alert and oriented times 3; coherent speech, normal   rate and volume, able to articulate logical thoughts, able   to abstract reason, no tangential thoughts, no hallucinations   or delusions  Her affect is normal and pleasant  RESP: No cough, no audible wheezing, able to talk in full sentences  Remainder of exam unable to be completed due to telephone visits          Assessment/Plan:  1. Anxiety and depression        Increase Prozac to 60mg every day. Will follow up again in 1 month to  reassess. PHQ/FLORIAN scores unchanged from March 2020.    Return in about 1 month (around 6/5/2020) for a telephone follow up.      Phone call duration:  5 minutes    Leonela Ibanez PA-C

## 2020-05-05 NOTE — TELEPHONE ENCOUNTER
"    Routing refill request to provider for review/approval because:  Labs out of range: phq9=15  Due for follow up 4/9/20        PHQ 8/14/2018 7/31/2019 3/9/2020   PHQ-9 Total Score 5 12 15   Q9: Thoughts of better off dead/self-harm past 2 weeks Not at all Not at all Not at all           Requested Prescriptions   Pending Prescriptions Disp Refills     FLUoxetine (PROZAC) 40 MG capsule [Pharmacy Med Name: FLUOXETINE HCL 40 MG CAPSULE] 90 capsule 0     Sig: TAKE 1 CAPSULE BY MOUTH EVERY DAY       SSRIs Protocol Failed - 5/4/2020  9:24 AM        Failed - PHQ-9 score less than 5 in past 6 months     Please review last PHQ-9 score.           Passed - Medication is active on med list        Passed - Patient is age 18 or older        Passed - No active pregnancy on record        Passed - No positive pregnancy test in last 12 months        Passed - Recent (6 mo) or future (30 days) visit within the authorizing provider's specialty     Patient had office visit in the last 6 months or has a visit in the next 30 days with authorizing provider or within the authorizing provider's specialty.  See \"Patient Info\" tab in inbasket, or \"Choose Columns\" in Meds & Orders section of the refill encounter.                 "

## 2020-05-06 ASSESSMENT — ANXIETY QUESTIONNAIRES: GAD7 TOTAL SCORE: 7

## 2020-05-29 DIAGNOSIS — F32.A ANXIETY AND DEPRESSION: ICD-10-CM

## 2020-05-29 DIAGNOSIS — F41.9 ANXIETY AND DEPRESSION: ICD-10-CM

## 2020-06-02 NOTE — TELEPHONE ENCOUNTER
Refused refill as patient has refills available. Refilled on 5/5/20 for 90 day supply with 1 refills. Message sent to pharmacy.    Rhea Schrader, RN, BSN

## 2020-06-08 ENCOUNTER — TELEPHONE (OUTPATIENT)
Dept: FAMILY MEDICINE | Facility: CLINIC | Age: 65
End: 2020-06-08

## 2020-06-08 DIAGNOSIS — F41.9 ANXIETY AND DEPRESSION: ICD-10-CM

## 2020-06-08 DIAGNOSIS — F32.A ANXIETY AND DEPRESSION: ICD-10-CM

## 2020-06-08 NOTE — TELEPHONE ENCOUNTER
Patient has scheduled telephone visit for depression/anxiety follow up with Leonela 6/10/20 at 7:40 am. Patient states she received a bill for last telephone visit for 250 dollars (as she has a deductible she has to meet) patient states she is doing a lot better with medication. Patient requested message to be sent to advise if Leonela still needs to have visit.

## 2020-06-09 ASSESSMENT — PATIENT HEALTH QUESTIONNAIRE - PHQ9
5. POOR APPETITE OR OVEREATING: NOT AT ALL
SUM OF ALL RESPONSES TO PHQ QUESTIONS 1-9: 0

## 2020-06-09 ASSESSMENT — ANXIETY QUESTIONNAIRES

## 2020-06-09 NOTE — TELEPHONE ENCOUNTER
PHQ 3/9/2020 5/5/2020 6/9/2020   PHQ-9 Total Score 15 15 0   Q9: Thoughts of better off dead/self-harm past 2 weeks Not at all Not at all Not at all     FLORIAN-7 SCORE 3/9/2020 5/5/2020 6/9/2020   Total Score - - -   Total Score 8 7 0

## 2020-06-09 NOTE — TELEPHONE ENCOUNTER
Not necessarily, if she's doing well and no changes need to be made follow up may not be needed. E-visits are also a good option if follow up from a previous visit is needed.   Please repeat PHQ/FLORIAN scores

## 2020-06-10 ASSESSMENT — ANXIETY QUESTIONNAIRES: GAD7 TOTAL SCORE: 0

## 2020-07-03 ENCOUNTER — TELEPHONE (OUTPATIENT)
Dept: AUDIOLOGY | Facility: CLINIC | Age: 65
End: 2020-07-03

## 2020-07-03 DIAGNOSIS — H90.3 SENSORINEURAL HEARING LOSS, BILATERAL: Primary | ICD-10-CM

## 2020-07-03 PROCEDURE — V5267 HEARING AID SUP/ACCESS/DEV: HCPCS | Performed by: AUDIOLOGIST

## 2020-07-03 PROCEDURE — 99207 ZZC NO CHARGE LOS: CPT | Performed by: AUDIOLOGIST

## 2020-07-03 NOTE — TELEPHONE ENCOUNTER
Patient requests large closed domes and waxguards be placed at the 2nd floor  for her.     CHARGES:    Supplies (waxguards)    Claus Han CCC-A  Licensed Audiologist #[id number]  7/3/2020

## 2020-07-03 NOTE — TELEPHONE ENCOUNTER
Reason for call:  Other   Patient called regarding (reason for call): hearing aid pieces   Additional comments: Patient calling regarding hearing aid pieces that she is needing, unsure what she was exactly asking for. Please call to advise.     Phone number to reach patient:  Home number on file [phone] (home)    Best Time:  Any     Can we leave a detailed message on this number?  YES    Travel screening: Not Applicable

## 2020-07-13 ENCOUNTER — ANCILLARY PROCEDURE (OUTPATIENT)
Dept: BONE DENSITY | Facility: CLINIC | Age: 65
End: 2020-07-13
Attending: PHYSICIAN ASSISTANT
Payer: COMMERCIAL

## 2020-07-13 DIAGNOSIS — Z78.0 POSTMENOPAUSAL STATUS: ICD-10-CM

## 2020-07-13 PROCEDURE — 77080 DXA BONE DENSITY AXIAL: CPT | Performed by: INTERNAL MEDICINE

## 2020-07-13 NOTE — LETTER
2020      Erica Ramos  [address] [facility]  [place] [address]        Dear ,    We are writing to inform you of your test results.    Your bone scan shows osteopenia, the precursor to osteoporosis. I recommend you consistently take calcium 600mg twice daily and vitamin D 2,000 units once daily. We'll repeat your scan in 3 years.     Resulted Orders   DEXA HIP/PELVIS/SPINE - Future    Narrative    BONE DENSITOMETRY  [address]  [place] [address]  2020      PATIENT: Erica Ramos  CHART: [id number]   :  1955  AGE:  64 year old  SEX:  female   REFERRING PROVIDER:  Leonela Ibanez PA-C     PROCEDURE:  Bone density scanning was performed using DXA technology of   the lumbar spine and hip.  Scanning was performed on a Lunar Prodigy   scanner.  Reporting is completed in the form of a T-score.  The T-score   represents the standard deviation from peak bone mass based on a young   healthy adult.     REFERENCE T-SCORES:       Normal                -1.0 and greater                                 Osteopenia         Between -1.0 and -2.5                                             Osteoporosis     -2.5 and less                                       RISK FACTORS:  Post-menopausal, Parent history of osteoporosis with a hip   fracture, Condition related to bone loss: rheumatoid arthritis, follow up   osteopenia    CURRENT TREATMENT:  Calcium, Vitamin D     FINDINGS:               Lumbar Spine (L1-L4)      T-score:  0.7, degenerative changes   present               Left Femoral Neck            T-score:  -1.6               Right Femoral Neck          T-score:  -1.8                       Lumbar (L1-L4) BMD: 1.262  Previous: 1.198                          Total Hip Mean BMD: 0.835  Previous: 0.826     Comparison is made to another DXA performed on the same Lunar Prodigy    machine on 2010.      IMPRESSION  Osteopenia., Degenerative changes of  "the lumbar spine which may falsely   elevate results.    There has been significant increase in bone density of the lumbar spine.   There has been no significant change in bone density of the hip(s).    Recommendations include ensuring adequate Calcium and Vitamin D.    The current NOF Guidelines recommend treatment for patients with prior hip   or vertebral fracture, T-score -2.5 or below, or 10 year risk of any major   osteoporotic fracture >20% or 10 year risk of hip fracture >3%, as   calculated using the FRAX calculator (www.shef.ac.uk/FRAX or you can   google \"FRAX\").      This patient's risks based on available information, with the use of FRAX,   are 23 % for major osteoporotic fracture and 1.6 % for hip fracture.   Based on these guidelines, treatment (in addition to calcium and vitamin   D) is for this patient, after ruling out other causes of osteoporosis.  This is meant as an aid to clinical decision making; one must still use   clinical judgement.      Follow up can be considered in 2 years.   ___________________  Brianna Pa M.D.  Electronically signed                 If you have any questions or concerns, please call the clinic at the number listed above.       Sincerely,    Leonela Ibanez PA-C/ernestine              "

## 2020-07-14 NOTE — RESULT ENCOUNTER NOTE
Please send the following letter to the patient:    Erica,    Your bone scan shows osteopenia, the precursor to osteoporosis. I recommend you consistently take calcium 600mg twice daily and vitamin D 2,000 units once daily. We'll repeat your scan in 3 years.     Please call me with any questions or concerns.          Leonela Ibanez PA-C

## 2020-07-23 ENCOUNTER — OFFICE VISIT (OUTPATIENT)
Dept: AUDIOLOGY | Facility: CLINIC | Age: 65
End: 2020-07-23
Payer: COMMERCIAL

## 2020-07-23 DIAGNOSIS — H90.3 SENSORINEURAL HEARING LOSS, BILATERAL: Primary | ICD-10-CM

## 2020-07-23 PROCEDURE — 99207 ZZC NO CHARGE LOS: CPT | Performed by: AUDIOLOGIST

## 2020-07-23 PROCEDURE — V5299 HEARING SERVICE: HCPCS | Performed by: AUDIOLOGIST

## 2020-07-23 NOTE — PROGRESS NOTES
AUDIOLOGY REPORT: HEARING AID RECHECK    SUBJECTIVE: Erica Ramos is a 64 year old female, :  1955, was seen in the Audiology Clinic at [facility] on 20 for a return check of their hearing aids.       Background:   Patient is here today with the complaint of the domes are not working like she hoped they would and was wondering if there was something bigger.     Procedures:       SIDE: Both    : Unitron    TYPE: Moxi E RITE    S/N: [id number] & [id number]    WARRANTY:     Nannette wanted bigger domes. We changed the large closed domes for large power domes. Nannette reports much better fit and much better sound quality. Patient was provided more large power domes.     Plan:   Patient will return as needed for hearing aid concerns.     NO CHARGE VISIT    Claus Han CCC-A  Licensed Audiologist #[id number]  2020

## 2020-08-17 DIAGNOSIS — I10 HYPERTENSION, GOAL BELOW 140/90: ICD-10-CM

## 2020-08-20 RX ORDER — LOSARTAN POTASSIUM 25 MG/1
25 TABLET ORAL DAILY
Qty: 90 TABLET | Refills: 0 | Status: SHIPPED | OUTPATIENT
Start: 2020-08-20 | End: 2020-11-11

## 2020-09-21 DIAGNOSIS — E78.5 HYPERLIPIDEMIA LDL GOAL <130: ICD-10-CM

## 2020-09-21 NOTE — TELEPHONE ENCOUNTER
Requested Prescriptions   Pending Prescriptions Disp Refills     pravastatin (PRAVACHOL) 20 MG tablet 90 tablet 3     Sig: Take 1 tablet (20 mg) by mouth daily   Last Written Prescription Date:  5-23-20  Last Fill Quantity: 90,  # refills: 3   Last office visit: 3/9/2020 with prescribing provider:  3-9-20   Future Office Visit:            There is no refill protocol information for this order

## 2020-09-23 RX ORDER — PRAVASTATIN SODIUM 20 MG
20 TABLET ORAL DAILY
Qty: 30 TABLET | Refills: 0 | Status: SHIPPED | OUTPATIENT
Start: 2020-09-23 | End: 2020-10-19

## 2020-09-23 NOTE — TELEPHONE ENCOUNTER
"Routing refill request to provider for review/approval because:  Labs not current:  ldl  Pt due for follow up    Medication pended for approval, 30 day supply with reminder.             Requested Prescriptions   Pending Prescriptions Disp Refills     pravastatin (PRAVACHOL) 20 MG tablet 30 tablet 0     Sig: Take 1 tablet (20 mg) by mouth daily       Statins Protocol Failed - 9/21/2020  8:31 AM        Failed - LDL on file in past 12 months     Recent Labs   Lab Test 08/01/19  1001   *             Passed - No abnormal creatine kinase in past 12 months     Recent Labs   Lab Test 09/12/13  1034                   Passed - Recent (12 mo) or future (30 days) visit within the authorizing provider's specialty     Patient has had an office visit with the authorizing provider or a provider within the authorizing providers department within the previous 12 mos or has a future within next 30 days. See \"Patient Info\" tab in inbasket, or \"Choose Columns\" in Meds & Orders section of the refill encounter.              Passed - Medication is active on med list        Passed - Patient is age 18 or older        Passed - No active pregnancy on record        Passed - No positive pregnancy test in past 12 months             "

## 2020-10-17 DIAGNOSIS — E78.5 HYPERLIPIDEMIA LDL GOAL <130: ICD-10-CM

## 2020-10-19 RX ORDER — PRAVASTATIN SODIUM 20 MG
TABLET ORAL
Qty: 30 TABLET | Refills: 0 | Status: SHIPPED | OUTPATIENT
Start: 2020-10-19 | End: 2020-11-19

## 2020-10-19 NOTE — TELEPHONE ENCOUNTER
Routing refill request to provider for review/approval because:  Yessy given x1 and patient did not follow up, please advise  Geraldine Johnson RN

## 2020-11-11 DIAGNOSIS — I10 HYPERTENSION, GOAL BELOW 140/90: ICD-10-CM

## 2020-11-11 RX ORDER — LOSARTAN POTASSIUM 25 MG/1
25 TABLET ORAL DAILY
Qty: 90 TABLET | Refills: 0 | Status: SHIPPED | OUTPATIENT
Start: 2020-11-11 | End: 2021-02-17

## 2020-11-19 DIAGNOSIS — E78.5 HYPERLIPIDEMIA LDL GOAL <130: ICD-10-CM

## 2020-11-19 RX ORDER — PRAVASTATIN SODIUM 20 MG
20 TABLET ORAL DAILY
Qty: 30 TABLET | Refills: 0 | Status: SHIPPED | OUTPATIENT
Start: 2020-11-19 | End: 2020-11-30

## 2020-11-19 NOTE — TELEPHONE ENCOUNTER
Requested Prescriptions   Pending Prescriptions Disp Refills     pravastatin (PRAVACHOL) 20 MG tablet 30 tablet 0     Sig: Take 1 tablet (20 mg) by mouth daily   Last Written Prescription Date:  10-19-20  Last Fill Quantity: 30,  # refills: 0   Last office visit: 3/9/2020 with prescribing provider:  3-9-20   Future Office Visit:   Next 5 appointments (look out 90 days)    Nov 27, 2020  8:00 AM  Office Visit with Leonela Ibanez PA-C  [facility] [place] ([facility] [address] [facility] [address]  [phone]                 There is no refill protocol information for this order

## 2020-11-27 ENCOUNTER — OFFICE VISIT (OUTPATIENT)
Dept: FAMILY MEDICINE | Facility: CLINIC | Age: 65
End: 2020-11-27
Payer: COMMERCIAL

## 2020-11-27 VITALS
SYSTOLIC BLOOD PRESSURE: 125 MMHG | HEART RATE: 80 BPM | TEMPERATURE: 99 F | DIASTOLIC BLOOD PRESSURE: 77 MMHG | OXYGEN SATURATION: 96 % | WEIGHT: 194.8 LBS | BODY MASS INDEX: 31.16 KG/M2

## 2020-11-27 DIAGNOSIS — E78.5 HYPERLIPIDEMIA LDL GOAL <130: Primary | ICD-10-CM

## 2020-11-27 DIAGNOSIS — R29.6 RECURRENT FALLS: ICD-10-CM

## 2020-11-27 DIAGNOSIS — I67.1 CEREBRAL ANEURYSM: ICD-10-CM

## 2020-11-27 DIAGNOSIS — M05.9 RHEUMATOID ARTHRITIS WITH POSITIVE RHEUMATOID FACTOR, INVOLVING UNSPECIFIED SITE (H): ICD-10-CM

## 2020-11-27 DIAGNOSIS — I10 HYPERTENSION, GOAL BELOW 140/90: ICD-10-CM

## 2020-11-27 LAB
ALBUMIN SERPL-MCNC: 3.9 G/DL (ref 3.4–5)
ALP SERPL-CCNC: 50 U/L (ref 40–150)
ALT SERPL W P-5'-P-CCNC: 30 U/L (ref 0–50)
ANION GAP SERPL CALCULATED.3IONS-SCNC: 4 MMOL/L (ref 3–14)
AST SERPL W P-5'-P-CCNC: 25 U/L (ref 0–45)
BILIRUB SERPL-MCNC: 0.6 MG/DL (ref 0.2–1.3)
BUN SERPL-MCNC: 16 MG/DL (ref 7–30)
CALCIUM SERPL-MCNC: 8.7 MG/DL (ref 8.5–10.1)
CHLORIDE SERPL-SCNC: 104 MMOL/L (ref 94–109)
CHOLEST SERPL-MCNC: 224 MG/DL
CO2 SERPL-SCNC: 31 MMOL/L (ref 20–32)
CREAT SERPL-MCNC: 0.71 MG/DL (ref 0.52–1.04)
CREAT UR-MCNC: 122 MG/DL
GFR SERPL CREATININE-BSD FRML MDRD: >90 ML/MIN/{1.73_M2}
GLUCOSE SERPL-MCNC: 90 MG/DL (ref 70–99)
HDLC SERPL-MCNC: 57 MG/DL
LDLC SERPL CALC-MCNC: 135 MG/DL
MICROALBUMIN UR-MCNC: 9 MG/L
MICROALBUMIN/CREAT UR: 7.11 MG/G CR (ref 0–25)
NONHDLC SERPL-MCNC: 167 MG/DL
POTASSIUM SERPL-SCNC: 4.9 MMOL/L (ref 3.4–5.3)
PROT SERPL-MCNC: 7.4 G/DL (ref 6.8–8.8)
SODIUM SERPL-SCNC: 139 MMOL/L (ref 133–144)
TRIGL SERPL-MCNC: 158 MG/DL

## 2020-11-27 PROCEDURE — 80053 COMPREHEN METABOLIC PANEL: CPT | Performed by: PHYSICIAN ASSISTANT

## 2020-11-27 PROCEDURE — 82043 UR ALBUMIN QUANTITATIVE: CPT | Performed by: PHYSICIAN ASSISTANT

## 2020-11-27 PROCEDURE — 36415 COLL VENOUS BLD VENIPUNCTURE: CPT | Performed by: PHYSICIAN ASSISTANT

## 2020-11-27 PROCEDURE — 99214 OFFICE O/P EST MOD 30 MIN: CPT | Performed by: PHYSICIAN ASSISTANT

## 2020-11-27 PROCEDURE — 80061 LIPID PANEL: CPT | Performed by: PHYSICIAN ASSISTANT

## 2020-11-27 ASSESSMENT — ANXIETY QUESTIONNAIRES
1. FEELING NERVOUS, ANXIOUS, OR ON EDGE: NOT AT ALL
5. BEING SO RESTLESS THAT IT IS HARD TO SIT STILL: NOT AT ALL
7. FEELING AFRAID AS IF SOMETHING AWFUL MIGHT HAPPEN: NOT AT ALL
2. NOT BEING ABLE TO STOP OR CONTROL WORRYING: NOT AT ALL
IF YOU CHECKED OFF ANY PROBLEMS ON THIS QUESTIONNAIRE, HOW DIFFICULT HAVE THESE PROBLEMS MADE IT FOR YOU TO DO YOUR WORK, TAKE CARE OF THINGS AT HOME, OR GET ALONG WITH OTHER PEOPLE: NOT DIFFICULT AT ALL
3. WORRYING TOO MUCH ABOUT DIFFERENT THINGS: NOT AT ALL
6. BECOMING EASILY ANNOYED OR IRRITABLE: NOT AT ALL
GAD7 TOTAL SCORE: 0

## 2020-11-27 ASSESSMENT — PATIENT HEALTH QUESTIONNAIRE - PHQ9
5. POOR APPETITE OR OVEREATING: NOT AT ALL
SUM OF ALL RESPONSES TO PHQ QUESTIONS 1-9: 0

## 2020-11-27 NOTE — PROGRESS NOTES
Subjective     Erica Ramos is a 65 year old female who presents to clinic today for the following health issues:    HPI         Hyperlipidemia Follow-Up      Are you regularly taking any medication or supplement to lower your cholesterol?   Yes- Pravastatin    Are you having muscle aches or other side effects that you think could be caused by your cholesterol lowering medication?  Yes- pravastatin    Possible dizziness, see below    Hypertension Follow-up      Do you check your blood pressure regularly outside of the clinic? Yes     Are you following a low salt diet? No    Are your blood pressures ever more than 140 on the top number (systolic) OR more   than 90 on the bottom number (diastolic), for example 140/90? No    Depression and Anxiety Follow-Up    How are you doing with your depression since your last visit? No change    How are you doing with your anxiety since your last visit?  No change    Are you having other symptoms that might be associated with depression or anxiety? No    Have you had a significant life event? No     Do you have any concerns with your use of alcohol or other drugs? No     3 falls in last couple months - balance related, possible dizziness?  One fall after stepping on a toy racecar  The second while she was putting together a daybed, daughter was helping her  Third was just as she was walking down the street - denies tripping over anything or catching her toe    **fasting    Social History     Tobacco Use     Smoking status: Former Smoker     Quit date: 2006     Years since quittin.9     Smokeless tobacco: Never Used   Substance Use Topics     Alcohol use: No     Drug use: No     PHQ 2020   PHQ-9 Total Score 15 0 0   Q9: Thoughts of better off dead/self-harm past 2 weeks Not at all Not at all Not at all     FLORIAN-7 SCORE 2020   Total Score - - -   Total Score 7 0 0     Last PHQ-9 2020   1.  Little interest or  pleasure in doing things 0   2.  Feeling down, depressed, or hopeless 0   3.  Trouble falling or staying asleep, or sleeping too much 0   4.  Feeling tired or having little energy 0   5.  Poor appetite or overeating 0   6.  Feeling bad about yourself 0   7.  Trouble concentrating 0   8.  Moving slowly or restless 0   Q9: Thoughts of better off dead/self-harm past 2 weeks 0   PHQ-9 Total Score 0   Difficulty at work, home, or with people Not difficult at all     FLORIAN-7  11/27/2020   1. Feeling nervous, anxious, or on edge 0   2. Not being able to stop or control worrying 0   3. Worrying too much about different things 0   4. Trouble relaxing 0   5. Being so restless that it is hard to sit still 0   6. Becoming easily annoyed or irritable 0   7. Feeling afraid, as if something awful might happen 0   FLORIAN-7 Total Score 0   If you checked any problems, how difficult have they made it for you to do your work, take care of things at home, or get along with other people? Not difficult at all       Suicide Assessment Five-step Evaluation and Treatment (SAFE-T)      How many servings of fruits and vegetables do you eat daily?  2-3    On average, how many sweetened beverages do you drink each day (Examples: soda, juice, sweet tea, etc.  Do NOT count diet or artificially sweetened beverages)?   0    How many days per week do you exercise enough to make your heart beat faster? None    How many minutes a day do you exercise enough to make your heart beat faster? none  How many days per week do you miss taking your medication? Unsure    What makes it hard for you to take your medications?  remembering to take      Review of Systems   Constitutional, cardiovascular, neuro, and psych systems are negative, except as otherwise noted.      Objective    /77   Pulse 80   Temp 99  F (37.2  C) (Tympanic)   Wt 88.4 kg (194 lb 12.8 oz)   SpO2 96%   BMI 31.16 kg/m    Body mass index is 31.16 kg/m .  Physical Exam   GENERAL: healthy,  alert and no distress  RESP: lungs clear to auscultation - no rales, rhonchi or wheezes  CV: regular rates and rhythm, normal S1 S2, no S3 or S4 and no murmur, click or rub  MS: no gross musculoskeletal defects noted, no edema  SKIN: no suspicious lesions or rashes  NEURO: Normal strength and tone, mentation intact and speech normal  PSYCH: mentation appears normal, affect normal/bright        Assessment & Plan     1. Hyperlipidemia LDL goal <130    2. Hypertension, goal below 140/90    3. Cerebral aneurysm    4. Recurrent falls    5. Rheumatoid arthritis with positive rheumatoid factor, involving unspecified site (H)         1,2) Blood pressure at goal. Due for routine labs today. No medication changes.     3,4) She will follow up with her neurologist, will likely need a repeat brain MRI. She will also mention the falls. Could consider a Zio/holter in future.    5) Plans on discussing her recent falls with her rheumatologist as well.         Return in about 6 months (around 5/27/2021) for your annual physical, a med check.    Leonela Ibanez PA-C  [facility]

## 2020-11-28 ASSESSMENT — ANXIETY QUESTIONNAIRES: GAD7 TOTAL SCORE: 0

## 2020-11-30 ENCOUNTER — TELEPHONE (OUTPATIENT)
Dept: FAMILY MEDICINE | Facility: CLINIC | Age: 65
End: 2020-11-30

## 2020-11-30 DIAGNOSIS — E78.5 HYPERLIPIDEMIA LDL GOAL <130: ICD-10-CM

## 2020-11-30 RX ORDER — PRAVASTATIN SODIUM 40 MG
40 TABLET ORAL DAILY
Qty: 90 TABLET | Refills: 0 | Status: SHIPPED | OUTPATIENT
Start: 2020-11-30 | End: 2021-03-01

## 2020-11-30 NOTE — TELEPHONE ENCOUNTER
Please call patient with the following info:    Kidney function, liver enzymes, and electrolytes are normal.  Your blood sugar is normal - no signs of diabetes.  Cholesterol (LDL() is above a goal of <130. I'd like to increase her pravastatin to 40mg daily. Then recheck labs in 2-3 months, lab only ok, please schedule

## 2020-12-02 NOTE — TELEPHONE ENCOUNTER
"Patient notified and voiced understanding and agreement.    Nannette will finish her Pravastatin 20 mg tablets (2 daily) to =40 mg and will also  the new prescription for 40 mg 1 tablet daily.     She has marked her calendar for a \"Lab Only\" visit in mid-February.     Nuha Johnston RN BSN      "

## 2020-12-12 DIAGNOSIS — E78.5 HYPERLIPIDEMIA LDL GOAL <130: ICD-10-CM

## 2020-12-14 RX ORDER — PRAVASTATIN SODIUM 20 MG
TABLET ORAL
Qty: 30 TABLET | Refills: 0 | OUTPATIENT
Start: 2020-12-14

## 2020-12-27 DIAGNOSIS — F41.9 ANXIETY AND DEPRESSION: ICD-10-CM

## 2020-12-27 DIAGNOSIS — F32.A ANXIETY AND DEPRESSION: ICD-10-CM

## 2020-12-28 NOTE — TELEPHONE ENCOUNTER
Pt needs Prozac refill.   [facility].  Pt was seen by Leonela in November.  Not sure why this medication wasn't refilled.    Please advise.  Can anyone refill for pt in Leonela's absence.

## 2020-12-29 NOTE — TELEPHONE ENCOUNTER
Last Written Prescription Date:  6/9/20  Last Fill Quantity: 270,  # refills: 1   Last office visit: 11/27/2020 with prescribing provider:  Leonela Ibanez   Future Office Visit: none    Prescription approved per [facility] Refill Protocol.    Rhea Schrader, RN, BSN  [facility]

## 2021-02-28 DIAGNOSIS — E78.5 HYPERLIPIDEMIA LDL GOAL <130: ICD-10-CM

## 2021-03-01 NOTE — TELEPHONE ENCOUNTER
Routing refill request to provider for review/approval because:  Patient needs to be seen because:  Due for recheck fasting labs after change in pravastatin on 11/30/20    Medication pended for approval, 30 day supply with reminder.

## 2021-03-02 RX ORDER — PRAVASTATIN SODIUM 40 MG
TABLET ORAL
Qty: 30 TABLET | Refills: 0 | Status: SHIPPED | OUTPATIENT
Start: 2021-03-02 | End: 2021-03-26

## 2021-03-12 ENCOUNTER — TELEPHONE (OUTPATIENT)
Dept: AUDIOLOGY | Facility: CLINIC | Age: 66
End: 2021-03-12

## 2021-03-12 NOTE — TELEPHONE ENCOUNTER
Reason for Call:  Other call back    Detailed comments: Patient stopped  In to pickup new grey square things on her hearing aids not sure what its called, Please call and advise     Phone Number Patient can be reached at: Home number on file [phone] (home)    Best Time: any    Can we leave a detailed message on this number? YES    Call taken on 3/12/2021 at 12:19 PM by Josee Cancino

## 2021-03-15 NOTE — TELEPHONE ENCOUNTER
Patient was requesting large power domes for her [employer] hearing aids be mailed to her home. I informed her it would be done.     -Claus Han CCC-A

## 2021-03-15 NOTE — TELEPHONE ENCOUNTER
Requested a return call to find what she needs and I could not determine by description provided.     -Claus Han CCC-A

## 2021-03-15 NOTE — TELEPHONE ENCOUNTER
Reason for Call:  Other call back    Detailed comments: Patient returning telephone call, please call back.    Phone Number Patient can be reached at: Home number on file [phone] (home)    Best Time: any    Can we leave a detailed message on this number? YES    Call taken on 3/15/2021 at 3:30 PM by Claus Mcdonnell

## 2021-04-11 ENCOUNTER — HEALTH MAINTENANCE LETTER (OUTPATIENT)
Age: 66
End: 2021-04-11

## 2021-04-26 ENCOUNTER — OFFICE VISIT (OUTPATIENT)
Dept: URGENT CARE | Facility: URGENT CARE | Age: 66
End: 2021-04-26
Payer: COMMERCIAL

## 2021-04-26 ENCOUNTER — TRANSFERRED RECORDS (OUTPATIENT)
Dept: HEALTH INFORMATION MANAGEMENT | Facility: CLINIC | Age: 66
End: 2021-04-26

## 2021-04-26 VITALS
OXYGEN SATURATION: 94 % | DIASTOLIC BLOOD PRESSURE: 95 MMHG | SYSTOLIC BLOOD PRESSURE: 165 MMHG | TEMPERATURE: 98.7 F | RESPIRATION RATE: 14 BRPM | HEART RATE: 70 BPM
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
## 2021-04-26 DIAGNOSIS — S09.90XA INJURY OF HEAD, INITIAL ENCOUNTER: ICD-10-CM

## 2021-04-26 RX ORDER — FOLIC ACID 1 MG/1
2 TABLET ORAL
COMMUNITY
Start: 2021-03-02 | End: 2022-03-02

## 2021-04-26 ASSESSMENT — ENCOUNTER SYMPTOMS
CARDIOVASCULAR NEGATIVE: 1
WOUND: 0
FEVER: 0
NAUSEA: 0
SORE THROAT: 0
EYES NEGATIVE: 1
SHORTNESS OF BREATH: 0
DIARRHEA: 0
NECK STIFFNESS: 0
WEAKNESS: 0
HEADACHES: 0
MYALGIAS: 0
RHINORRHEA: 0
JOINT SWELLING: 0
VOMITING: 0
DIZZINESS: 0
LIGHT-HEADEDNESS: 0
NECK PAIN: 0
CHILLS: 0
COUGH: 0
PALPITATIONS: 0
ALLERGIC/IMMUNOLOGIC NEGATIVE: 1
RESPIRATORY NEGATIVE: 1
ENDOCRINE NEGATIVE: 1
ARTHRALGIAS: 1
BACK PAIN: 0

## 2021-04-26 NOTE — PROGRESS NOTES
Chief Complaint:    Chief Complaint   Patient presents with     Fall     Pt tripped and fell down about 30 min ago. Pt hit her head and right arm, laceration above right eye. TDAP UTD         Medical Decision Making:    Differential Diagnosis:  {UC Differential Choices:499800}      ASSESSMENT:     No diagnosis found.       PLAN:     ***  Patient instructed to follow up with PCP in 1 week if symptoms are not improving.  Sooner if symptoms worsen.  Worrisome symptoms discussed with instructions to go to the ED.  Patient verbalized understanding and agreed with this plan.    Labs:     No results found for any visits on 04/26/21.    Current Meds:    Current Outpatient Medications:      adalimumab (HUMIRA) 40 MG/0.8ML prefilled syringe kit, Inject 40 mg Subcutaneous, Disp: , Rfl:      ASPIRIN ADULT LOW STRENGTH PO, , Disp: , Rfl:      CALCIUM-VITAMIN D PO, , Disp: , Rfl:      FLUoxetine (PROZAC) 20 MG capsule, TAKE 3 CAPSULES BY MOUTH EVERY DAY, Disp: 270 capsule, Rfl: 1     folic acid (FOLVITE) 1 MG tablet, Take 2 mg by mouth, Disp: , Rfl:      leucovorin (WELLCOVORIN) 5 MG tablet, , Disp: , Rfl:      losartan (COZAAR) 25 MG tablet, Take 1 tablet (25 mg) by mouth daily - DUE FOR LABS, Disp: 90 tablet, Rfl: 0     methotrexate 2.5 MG tablet CHEMO, Take 25 mg by mouth, Disp: , Rfl:      pravastatin (PRAVACHOL) 40 MG tablet, Take 1 tablet (40 mg) by mouth daily - DUE FOR FOLLOW UP AND LABS IN MAY, Disp: 60 tablet, Rfl: 0     ALPRAZolam (XANAX) 0.5 MG tablet, Take 1 tablet (0.5 mg) by mouth 2 times daily as needed for anxiety - use sparingly (Patient not taking: Reported on 4/26/2021), Disp: 20 tablet, Rfl: 2     predniSONE (DELTASONE) 5 MG tablet, TAKE 2 TABLETS BY MOUTH ONCE DAILY WITH A MEAL., Disp: , Rfl: 3    Allergies:  No Known Allergies    SUBJECTIVE    HPI: Erica Ramos is an 65 year old female who presents for evaluation and treatment of fall.  Patient tripped and fell to the ground about 30 minutes ago.   She hit her head on the ground and landed on her R side.      ROS:      Review of Systems   Constitutional: Negative for chills and fever.   HENT: Negative for congestion, ear pain, rhinorrhea and sore throat.    Eyes: Negative.    Respiratory: Negative.  Negative for cough and shortness of breath.    Cardiovascular: Negative.  Negative for chest pain and palpitations.   Gastrointestinal: Negative for diarrhea, nausea and vomiting.   Endocrine: Negative.    Genitourinary: Negative.    Musculoskeletal: Positive for arthralgias. Negative for back pain, joint swelling, myalgias, neck pain and neck stiffness.   Skin: Negative.  Negative for rash and wound.   Allergic/Immunologic: Negative.  Negative for immunocompromised state.   Neurological: Negative for dizziness, weakness, light-headedness and headaches.        Family History   Family History   Problem Relation Age of Onset     Cancer Mother         non hodgkins lymphoma     C.A.D. Father      Hypertension Father      Cerebrovascular Disease Father      Hypertension Brother      Cancer Daughter         lymphoma       Social History  Social History     Socioeconomic History     Marital status:      Spouse name: Not on file     Number of children: 3     Years of education: Not on file     Highest education level: Not on file   Occupational History     Employer: UNEMPLOYED     Employer: SELF   Social Needs     Financial resource strain: Not on file     Food insecurity     Worry: Not on file     Inability: Not on file     Transportation needs     Medical: Not on file     Non-medical: Not on file   Tobacco Use     Smoking status: Former Smoker     Quit date: 2006     Years since quittin.3     Smokeless tobacco: Never Used   Substance and Sexual Activity     Alcohol use: No     Drug use: No     Sexual activity: Yes     Partners: Male   Lifestyle     Physical activity     Days per week: Not on file     Minutes per session: Not on file     Stress: Not on  file   Relationships     Social connections     Talks on phone: Not on file     Gets together: Not on file     Attends Mu-ism service: Not on file     Active member of club or organization: Not on file     Attends meetings of clubs or organizations: Not on file     Relationship status: Not on file     Intimate partner violence     Fear of current or ex partner: Not on file     Emotionally abused: Not on file     Physically abused: Not on file     Forced sexual activity: Not on file   Other Topics Concern     Parent/sibling w/ CABG, MI or angioplasty before 65F 55M? Not Asked   Social History Narrative     Not on file        Surgical History:  Past Surgical History:   Procedure Laterality Date     COLONOSCOPY WITH CO2 INSUFFLATION N/A 12/19/2017    Procedure: COLONOSCOPY WITH CO2 INSUFFLATION;  COLON SCREEN/ KNAEBLE;  Surgeon: Romeo Almonte MD;  Location: [facility] OR     D & C      polyps     SURGICAL HISTORY OF - 8-08    aneurysm repair        Problem List:  Patient Active Problem List   Diagnosis     Obesity     Advanced directives, counseling/discussion     Hyperlipidemia LDL goal <130     Hypertension, goal below 140/90     Anxiety and depression     Rheumatoid arthritis with positive rheumatoid factor, involving unspecified site (H)     Cerebral aneurysm     Osteopenia of both hips           OBJECTIVE:     Vital signs noted and reviewed by Kelvin Swain PA-C  BP (!) 165/95 (BP Location: Left arm, Patient Position: Sitting, Cuff Size: Adult Regular)   Pulse 70   Temp 98.7  F (37.1  C) (Tympanic)   Resp 14   SpO2 94%      PEFR:    Physical Exam  Vitals signs and nursing note reviewed.   Constitutional:       General: She is not in acute distress.     Appearance: She is well-developed. She is not ill-appearing, toxic-appearing or diaphoretic.   HENT:      Head: Normocephalic and atraumatic.      Right Ear: Tympanic membrane and external ear normal. No drainage, swelling or tenderness. Tympanic  membrane is not perforated, erythematous, retracted or bulging.      Left Ear: Tympanic membrane and external ear normal. No drainage, swelling or tenderness. Tympanic membrane is not perforated, erythematous, retracted or bulging.      Nose: No mucosal edema, congestion or rhinorrhea.      Right Sinus: No maxillary sinus tenderness or frontal sinus tenderness.      Left Sinus: No maxillary sinus tenderness or frontal sinus tenderness.      Mouth/Throat:      Pharynx: No pharyngeal swelling, oropharyngeal exudate, posterior oropharyngeal erythema or uvula swelling.      Tonsils: No tonsillar abscesses.   Eyes:      Pupils: Pupils are equal, round, and reactive to light.   Neck:      Musculoskeletal: Full passive range of motion without pain, normal range of motion and neck supple.      Trachea: Trachea normal.   Cardiovascular:      Rate and Rhythm: Normal rate and regular rhythm.      Heart sounds: Normal heart sounds, S1 normal and S2 normal. No murmur. No friction rub. No gallop.    Pulmonary:      Effort: Pulmonary effort is normal. No respiratory distress.      Breath sounds: Normal breath sounds. No decreased breath sounds, wheezing, rhonchi or rales.   Abdominal:      General: Bowel sounds are normal. There is no distension.      Palpations: Abdomen is soft. Abdomen is not rigid. There is no mass.      Tenderness: There is no abdominal tenderness. There is no guarding or rebound.   Lymphadenopathy:      Cervical: No cervical adenopathy.   Skin:     General: Skin is warm and dry.   Neurological:      Mental Status: She is alert and oriented to person, place, and time.      Cranial Nerves: No cranial nerve deficit.      Deep Tendon Reflexes: Reflexes are normal and symmetric.   Psychiatric:         Behavior: Behavior normal. Behavior is cooperative.         Thought Content: Thought content normal.         Judgment: Judgment normal.               Kelvin Swain PA-C  4/26/2021, 6:18 PM

## 2021-04-28 ENCOUNTER — OFFICE VISIT (OUTPATIENT)
Dept: ORTHOPEDICS | Facility: CLINIC | Age: 66
End: 2021-04-28
Payer: COMMERCIAL

## 2021-04-28 VITALS
HEIGHT: 67 IN | HEART RATE: 75 BPM | DIASTOLIC BLOOD PRESSURE: 79 MMHG | WEIGHT: 199.5 LBS | SYSTOLIC BLOOD PRESSURE: 122 MMHG | BODY MASS INDEX: 31.31 KG/M2

## 2021-04-28 DIAGNOSIS — S42.201A TRAUMATIC CLOSED DISPLACED FRACTURE OF PROXIMAL END OF RIGHT HUMERUS, INITIAL ENCOUNTER: Primary | ICD-10-CM

## 2021-04-28 PROCEDURE — 23600 CLTX PROX HUMRL FX W/O MNPJ: CPT | Mod: RT | Performed by: ORTHOPAEDIC SURGERY

## 2021-04-28 PROCEDURE — 99203 OFFICE O/P NEW LOW 30 MIN: CPT | Mod: 57 | Performed by: ORTHOPAEDIC SURGERY

## 2021-04-28 RX ORDER — OXYCODONE AND ACETAMINOPHEN 5; 325 MG/1; MG/1
1 TABLET ORAL EVERY 6 HOURS PRN
COMMUNITY
End: 2021-05-20

## 2021-04-28 ASSESSMENT — MIFFLIN-ST. JEOR: SCORE: 1487.56

## 2021-04-28 ASSESSMENT — PAIN SCALES - GENERAL: PAINLEVEL: EXTREME PAIN (8)

## 2021-04-28 NOTE — PROGRESS NOTES
CHIEF COMPLAINT:   Chief Complaint   Patient presents with     Right Shoulder - Pain     Patient on the shoulder on 4/26/21 and was seen at [facility]. She reports to the clinic today in a sling. Pain is noted to be 8/10. She states that she has ice for the pain and received a prescription of Oxycodone (every 6 hrs).   .    HISTORY:  Erica Ramos is a 65 year old female, right  -hand dominant, who is seen as ED followup for right shoulder injury that occurred 2 days ago. Reports tripping and falling on 4/26/2021, landing on right upper extremity, face. Was seen at [facility], with xrays showing proximal humerus fracture. Also some facial lacerations that were treated. Presents today for followup of the shoulder. Has been in a sling.    Treatment with sling, ice, pain medications (oxycodone, 4 tabs per day).    No numbness and tingling.    Onset: Following acute injury.  Mechanism of injury:  Blow/fall 4/26/2021  Symptoms have been unchanged since that time.  Aggrevated by: movement  Relieved by: pain medications, sling, no movements.  Pain location: located throughout the shoulder joint/diffuse  Pain severity: 8/10  Pain quality: aching, sharp and throbbing  Frequency of symptoms: are constant  Associated symptoms: pain to the elbow.    Prior history of related problems: yes, right shoulder pain 2015; has rheumatoid arthritis so pain in most of her joints.      Other PMH:  has a past medical history of Brain aneurysm, Depression, major, and Hyperlipidaemia. She also has no past medical history of Basal cell carcinoma, Malignant melanoma nos, Skin cancer, or Squamous cell carcinoma.  Patient Active Problem List   Diagnosis     Obesity     Advanced directives, counseling/discussion     Hyperlipidemia LDL goal <130     Hypertension, goal below 140/90     Anxiety and depression     Rheumatoid arthritis with positive rheumatoid factor, involving unspecified site (H)     Cerebral aneurysm     Osteopenia of both  hips       Surgical Hx:  has a past surgical history that includes d & c; surgical history of -  (8-08); and Colonoscopy with CO2 insufflation (N/A, 12/19/2017).    Medications:   Current Outpatient Medications:      adalimumab (HUMIRA) 40 MG/0.8ML prefilled syringe kit, Inject 40 mg Subcutaneous, Disp: , Rfl:      ALPRAZolam (XANAX) 0.5 MG tablet, Take 1 tablet (0.5 mg) by mouth 2 times daily as needed for anxiety - use sparingly (Patient not taking: Reported on 4/26/2021), Disp: 20 tablet, Rfl: 2     ASPIRIN ADULT LOW STRENGTH PO, , Disp: , Rfl:      CALCIUM-VITAMIN D PO, , Disp: , Rfl:      FLUoxetine (PROZAC) 20 MG capsule, TAKE 3 CAPSULES BY MOUTH EVERY DAY, Disp: 270 capsule, Rfl: 1     folic acid (FOLVITE) 1 MG tablet, Take 2 mg by mouth, Disp: , Rfl:      leucovorin (WELLCOVORIN) 5 MG tablet, , Disp: , Rfl:      losartan (COZAAR) 25 MG tablet, Take 1 tablet (25 mg) by mouth daily - DUE FOR LABS, Disp: 90 tablet, Rfl: 0     methotrexate 2.5 MG tablet CHEMO, Take 25 mg by mouth, Disp: , Rfl:      pravastatin (PRAVACHOL) 40 MG tablet, Take 1 tablet (40 mg) by mouth daily - DUE FOR FOLLOW UP AND LABS IN MAY, Disp: 60 tablet, Rfl: 0     predniSONE (DELTASONE) 5 MG tablet, TAKE 2 TABLETS BY MOUTH ONCE DAILY WITH A MEAL., Disp: , Rfl: 3    Allergies: No Known Allergies    Social Hx: works as a PCA.   reports that she quit smoking about 14 years ago. She has never used smokeless tobacco. She reports that she does not drink alcohol or use drugs.    Family Hx: family history includes C.A.D. in her father; Cancer in her daughter and mother; Cerebrovascular Disease in her father; Hypertension in her brother and father..    REVIEW OF SYSTEMS: 10 point ROS neg other than the symptoms noted above in the HPI and PMH. Notables include  CONSTITUTIONAL:NEGATIVE for fever, chills, change in weight  INTEGUMENTARY/SKIN: NEGATIVE for worrisome rashes, moles or lesions  MUSCULOSKELETAL:See HPI above  NEURO: NEGATIVE for weakness,  "dizziness or paresthesias    PHYSICAL EXAM:  /79   Pulse 75   Ht 1.702 m (5' 7\")   Wt 90.5 kg (199 lb 8 oz)   BMI 31.25 kg/m     GENERAL APPEARANCE: healthy, alert, no distress; steris over right eyebrow. Accompanied by her .  SKIN: diffuse right upper extremity ecchymosis, otherwise no suspicious lesions or rashes  NEURO: Normal strength and tone, mentation intact and speech normal  PSYCH:  mentation appears normal and affect normal, not anxious  RESPIRATORY: No increased work of breathing.  VASCULAR: Radial pulses 2+ and brisk cappillary refill   LYMPH: no palpable axillary lymphadenopathy or cervical neck lymphadenopathy.      MUSCULOSKELETAL:      RIGHT UPPER EXTREMITY:  Sensation intact to light touch in median, radial, ulnar and axillary nerve distributions  Palpable 2+ radial pulse, brisk capillary refill to all fingers, wwp  Intact epl fpl fdp edc wrist flexion/extension biceps triceps deltoid    RIGHT SHOULDER:  Shoulder Inspection: diffuse swelling of the right shoulder to the elbow, diffuse ecchymosis mostly mid to distal arm/elbow.  Tender: diffuse shoulder, upper arm to the elbow.  Range of Motion:   Active: not assessed   Passive: not assessed.        X-RAY INTERPRETATION: 3 views right  shoulder obtained 4/26/2021 , [facility], were reviewed personally in clinic today with the patient. On my review, there is an impacted, transverse fracture of the humeral neck with mild valgus displacement, minimally displaced greater tuberosity fracture.      ASSESSMENT: Erica Ramos is a 65 year old female, right  -hand dominant with mildly displaced right proximal humerus fracture s/p fall.      PLAN:   * reviewed xrays with patient. Based on the xrays, the fracture is mildly displaced and in acceptable alignment. As long as fracture maintains alignment, nonoperative treatment can be pursued. However, if fracture displaces, then the fracture is unstable, and would recommend open-reduction " and internal fixation or some sort of arthroplasty. I did discuss that we would need to monitor this fracture on a every other weekly basis for the next couple of weeks, such that if fracture does displace, we can catch it sooner rather than later, and proceed with treatment at that time. Patient does understand.   * rest, ice, heat, pain control  * strict non weight bearing right upper extremity  * sling at all times, day and night  * no shoulder range of motion at this time  * reassess 2 weeks, sooner if needed, xrays right shoulder ( internal rotation, external rotation, Y views).  * she can call if she needs a refill of the oxycodone. Continue acetaminophen for pain as well.        Francesco Jovel M.D., M.S.  Dept. of Orthopaedic Surgery  [facility]

## 2021-04-28 NOTE — LETTER
4/28/2021         RE: Erica Ramos  [address]  [place] [address]        Dear Colleague,    Thank you for referring your patient, Erica Ramos, to the [facility]. Please see a copy of my visit note below.    CHIEF COMPLAINT:   Chief Complaint   Patient presents with     Right Shoulder - Pain     Patient on the shoulder on 4/26/21 and was seen at [place] [facility]. She reports to the clinic today in a sling. Pain is noted to be 8/10. She states that she has ice for the pain and received a prescription of Oxycodone (every 6 hrs).   .    HISTORY:  Erica Ramos is a 65 year old female, right  -hand dominant, who is seen as ED followup for right shoulder injury that occurred 2 days ago. Reports tripping and falling on 4/26/2021, landing on right upper extremity, face. Was seen at [facility], with xrays showing proximal humerus fracture. Also some facial lacerations that were treated. Presents today for followup of the shoulder. Has been in a sling.    Treatment with sling, ice, pain medications (oxycodone, 4 tabs per day).    No numbness and tingling.    Onset: Following acute injury.  Mechanism of injury:  Blow/fall 4/26/2021  Symptoms have been unchanged since that time.  Aggrevated by: movement  Relieved by: pain medications, sling, no movements.  Pain location: located throughout the shoulder joint/diffuse  Pain severity: 8/10  Pain quality: aching, sharp and throbbing  Frequency of symptoms: are constant  Associated symptoms: pain to the elbow.    Prior history of related problems: yes, right shoulder pain 2015; has rheumatoid arthritis so pain in most of her joints.      Other PMH:  has a past medical history of Brain aneurysm, Depression, major, and Hyperlipidaemia. She also has no past medical history of Basal cell carcinoma, Malignant melanoma nos, Skin cancer, or Squamous cell carcinoma.  Patient Active Problem List   Diagnosis     Obesity      Advanced directives, counseling/discussion     Hyperlipidemia LDL goal <130     Hypertension, goal below 140/90     Anxiety and depression     Rheumatoid arthritis with positive rheumatoid factor, involving unspecified site (H)     Cerebral aneurysm     Osteopenia of both hips       Surgical Hx:  has a past surgical history that includes d & c; surgical history of -  (8-08); and Colonoscopy with CO2 insufflation (N/A, 12/19/2017).    Medications:   Current Outpatient Medications:      adalimumab (HUMIRA) 40 MG/0.8ML prefilled syringe kit, Inject 40 mg Subcutaneous, Disp: , Rfl:      ALPRAZolam (XANAX) 0.5 MG tablet, Take 1 tablet (0.5 mg) by mouth 2 times daily as needed for anxiety - use sparingly (Patient not taking: Reported on 4/26/2021), Disp: 20 tablet, Rfl: 2     ASPIRIN ADULT LOW STRENGTH PO, , Disp: , Rfl:      CALCIUM-VITAMIN D PO, , Disp: , Rfl:      FLUoxetine (PROZAC) 20 MG capsule, TAKE 3 CAPSULES BY MOUTH EVERY DAY, Disp: 270 capsule, Rfl: 1     folic acid (FOLVITE) 1 MG tablet, Take 2 mg by mouth, Disp: , Rfl:      leucovorin (WELLCOVORIN) 5 MG tablet, , Disp: , Rfl:      losartan (COZAAR) 25 MG tablet, Take 1 tablet (25 mg) by mouth daily - DUE FOR LABS, Disp: 90 tablet, Rfl: 0     methotrexate 2.5 MG tablet CHEMO, Take 25 mg by mouth, Disp: , Rfl:      pravastatin (PRAVACHOL) 40 MG tablet, Take 1 tablet (40 mg) by mouth daily - DUE FOR FOLLOW UP AND LABS IN MAY, Disp: 60 tablet, Rfl: 0     predniSONE (DELTASONE) 5 MG tablet, TAKE 2 TABLETS BY MOUTH ONCE DAILY WITH A MEAL., Disp: , Rfl: 3    Allergies: No Known Allergies    Social Hx: works as a PCA.   reports that she quit smoking about 14 years ago. She has never used smokeless tobacco. She reports that she does not drink alcohol or use drugs.    Family Hx: family history includes C.A.D. in her father; Cancer in her daughter and mother; Cerebrovascular Disease in her father; Hypertension in her brother and father..    REVIEW OF SYSTEMS: 10 point  "ROS neg other than the symptoms noted above in the HPI and PMH. Notables include  CONSTITUTIONAL:NEGATIVE for fever, chills, change in weight  INTEGUMENTARY/SKIN: NEGATIVE for worrisome rashes, moles or lesions  MUSCULOSKELETAL:See HPI above  NEURO: NEGATIVE for weakness, dizziness or paresthesias    PHYSICAL EXAM:  /79   Pulse 75   Ht 1.702 m (5' 7\")   Wt 90.5 kg (199 lb 8 oz)   BMI 31.25 kg/m     GENERAL APPEARANCE: healthy, alert, no distress; steris over right eyebrow. Accompanied by her .  SKIN: diffuse right upper extremity ecchymosis, otherwise no suspicious lesions or rashes  NEURO: Normal strength and tone, mentation intact and speech normal  PSYCH:  mentation appears normal and affect normal, not anxious  RESPIRATORY: No increased work of breathing.  VASCULAR: Radial pulses 2+ and brisk cappillary refill   LYMPH: no palpable axillary lymphadenopathy or cervical neck lymphadenopathy.      MUSCULOSKELETAL:      RIGHT UPPER EXTREMITY:  Sensation intact to light touch in median, radial, ulnar and axillary nerve distributions  Palpable 2+ radial pulse, brisk capillary refill to all fingers, wwp  Intact epl fpl fdp edc wrist flexion/extension biceps triceps deltoid    RIGHT SHOULDER:  Shoulder Inspection: diffuse swelling of the right shoulder to the elbow, diffuse ecchymosis mostly mid to distal arm/elbow.  Tender: diffuse shoulder, upper arm to the elbow.  Range of Motion:   Active: not assessed   Passive: not assessed.        X-RAY INTERPRETATION: 3 views right  shoulder obtained 4/26/2021 , [facility], were reviewed personally in clinic today with the patient. On my review, there is an impacted, transverse fracture of the humeral neck with mild valgus displacement, minimally displaced greater tuberosity fracture.      ASSESSMENT: Erica Ramos is a 65 year old female, right  -hand dominant with mildly displaced right proximal humerus fracture s/p fall.      PLAN:   * reviewed xrays " with patient. Based on the xrays, the fracture is mildly displaced and in acceptable alignment. As long as fracture maintains alignment, nonoperative treatment can be pursued. However, if fracture displaces, then the fracture is unstable, and would recommend open-reduction and internal fixation or some sort of arthroplasty. I did discuss that we would need to monitor this fracture on a every other weekly basis for the next couple of weeks, such that if fracture does displace, we can catch it sooner rather than later, and proceed with treatment at that time. Patient does understand.   * rest, ice, heat, pain control  * strict non weight bearing right upper extremity  * sling at all times, day and night  * no shoulder range of motion at this time  * reassess 2 weeks, sooner if needed, xrays right shoulder ( internal rotation, external rotation, Y views).  * she can call if she needs a refill of the oxycodone. Continue acetaminophen for pain as well.        Francesco Jovel M.D., M.S.  Dept. of Orthopaedic Surgery  [facility]      Again, thank you for allowing me to participate in the care of your patient.        Sincerely,        Francesco Jovel MD

## 2021-04-30 ENCOUNTER — TELEPHONE (OUTPATIENT)
Dept: ORTHOPEDICS | Facility: CLINIC | Age: 66
End: 2021-04-30

## 2021-04-30 DIAGNOSIS — S42.291D OTHER CLOSED DISPLACED FRACTURE OF PROXIMAL END OF RIGHT HUMERUS WITH ROUTINE HEALING, SUBSEQUENT ENCOUNTER: Primary | ICD-10-CM

## 2021-04-30 RX ORDER — OXYCODONE AND ACETAMINOPHEN 5; 325 MG/1; MG/1
1 TABLET ORAL EVERY 6 HOURS PRN
Qty: 12 TABLET | Refills: 0 | Status: SHIPPED | OUTPATIENT
Start: 2021-04-30 | End: 2021-05-03

## 2021-05-12 ENCOUNTER — OFFICE VISIT (OUTPATIENT)
Dept: ORTHOPEDICS | Facility: CLINIC | Age: 66
End: 2021-05-12
Payer: COMMERCIAL

## 2021-05-12 ENCOUNTER — ANCILLARY PROCEDURE (OUTPATIENT)
Dept: GENERAL RADIOLOGY | Facility: CLINIC | Age: 66
End: 2021-05-12
Attending: ORTHOPAEDIC SURGERY
Payer: COMMERCIAL

## 2021-05-12 VITALS
BODY MASS INDEX: 30.75 KG/M2 | DIASTOLIC BLOOD PRESSURE: 84 MMHG | HEIGHT: 67 IN | SYSTOLIC BLOOD PRESSURE: 136 MMHG | WEIGHT: 195.9 LBS | HEART RATE: 85 BPM

## 2021-05-12 DIAGNOSIS — S42.201D CLOSED TRAUMATIC DISPLACED FRACTURE OF PROXIMAL END OF RIGHT HUMERUS WITH ROUTINE HEALING, SUBSEQUENT ENCOUNTER: Primary | ICD-10-CM

## 2021-05-12 DIAGNOSIS — S42.201A: ICD-10-CM

## 2021-05-12 PROCEDURE — 99207 PR FRACTURE CARE IN GLOBAL PERIOD: CPT | Performed by: ORTHOPAEDIC SURGERY

## 2021-05-12 PROCEDURE — 73030 X-RAY EXAM OF SHOULDER: CPT | Mod: RT | Performed by: RADIOLOGY

## 2021-05-12 ASSESSMENT — MIFFLIN-ST. JEOR: SCORE: 1471.23

## 2021-05-12 ASSESSMENT — PAIN SCALES - GENERAL: PAINLEVEL: MILD PAIN (2)

## 2021-05-12 NOTE — LETTER
5/12/2021         RE: Erica Ramos  [address]  [place] [address]        Dear Colleague,    Thank you for referring your patient, Erica Ramos, to the [facility]. Please see a copy of my visit note below.    CHIEF COMPLAINT:   Chief Complaint   Patient presents with     Right Shoulder - Follow Up     Right shoulder fracture on 4/26/2.  She has been icing during the day and using Tylenol for daytime pain management. Pain is rated as a 2/10 and 6/10. She is only using the Oxycodone at night for pain management. Patient is right handed.      INJURY: right proximal humerus fracture  DATE of INJURY: 4/26/2021      HISTORY:  Erica Ramos is a 65 year old female, right  -hand dominant, who is seen in followup for right shoulder injury that occurred 2 weeks ago. Returns today overall feeling better. Using tylenol during the day and oxycodone at night. she's also been icing.    Injury from a trip and fall on 4/26/2021, landing on right upper extremity, face. Was seen at [facility], with xrays showing proximal humerus fracture. Also some facial lacerations that were treated.     Treatment with sling, ice, pain medications (oxycodone, tylenol).    No numbness and tingling.    Onset: Following acute injury.  Mechanism of injury:  Blow/fall 4/26/2021  Symptoms have been improving since that time.  Aggrevated by: movement  Relieved by: pain medications, sling, no movements.  Pain location: located throughout the shoulder joint/diffuse  Pain severity: 2/10  Pain quality: aching, sharp and throbbing  Frequency of symptoms: are constant  Associated symptoms: pain to the elbow.    Prior history of related problems: yes, right shoulder pain 2015; has rheumatoid arthritis so pain in most of her joints.  Medications:   Current Outpatient Medications:      adalimumab (HUMIRA) 40 MG/0.8ML prefilled syringe kit, Inject 40 mg Subcutaneous, Disp: , Rfl:       "ALPRAZolam (XANAX) 0.5 MG tablet, Take 1 tablet (0.5 mg) by mouth 2 times daily as needed for anxiety - use sparingly (Patient not taking: Reported on 4/26/2021), Disp: 20 tablet, Rfl: 2     ASPIRIN ADULT LOW STRENGTH PO, , Disp: , Rfl:      CALCIUM-VITAMIN D PO, , Disp: , Rfl:      FLUoxetine (PROZAC) 20 MG capsule, TAKE 3 CAPSULES BY MOUTH EVERY DAY, Disp: 270 capsule, Rfl: 1     folic acid (FOLVITE) 1 MG tablet, Take 2 mg by mouth, Disp: , Rfl:      leucovorin (WELLCOVORIN) 5 MG tablet, , Disp: , Rfl:      losartan (COZAAR) 25 MG tablet, Take 1 tablet (25 mg) by mouth daily - DUE FOR LABS, Disp: 90 tablet, Rfl: 0     methotrexate 2.5 MG tablet CHEMO, Take 25 mg by mouth, Disp: , Rfl:      oxyCODONE-acetaminophen (PERCOCET) 5-325 MG tablet, Take 1 tablet by mouth every 6 hours as needed for severe pain, Disp: , Rfl:      pravastatin (PRAVACHOL) 40 MG tablet, Take 1 tablet (40 mg) by mouth daily - DUE FOR FOLLOW UP AND LABS IN MAY, Disp: 60 tablet, Rfl: 0     predniSONE (DELTASONE) 5 MG tablet, TAKE 2 TABLETS BY MOUTH ONCE DAILY WITH A MEAL., Disp: , Rfl: 3    Allergies: No Known Allergies      REVIEW OF SYSTEMS:   CONSTITUTIONAL:NEGATIVE for fever, chills, change in weight  INTEGUMENTARY/SKIN: NEGATIVE for worrisome rashes, moles or lesions  MUSCULOSKELETAL:See HPI above  NEURO: NEGATIVE for weakness, dizziness or paresthesias    PHYSICAL EXAM:  /84   Pulse 85   Ht 1.702 m (5' 7\")   Wt 88.9 kg (195 lb 14.4 oz)   BMI 30.68 kg/m     GENERAL APPEARANCE: healthy, alert, no distress  SKIN: diffuse right upper extremity ecchymosis, otherwise no suspicious lesions or rashes  NEURO: Normal strength and tone, mentation intact and speech normal  PSYCH:  mentation appears normal and affect normal, not anxious  RESPIRATORY: No increased work of breathing.  VASCULAR: Radial pulses 2+ and brisk cappillary refill       MUSCULOSKELETAL:      RIGHT UPPER EXTREMITY:  Sensation intact to light touch in median, radial, " ulnar and axillary nerve distributions  Palpable 2+ radial pulse, brisk capillary refill to all fingers, wwp  Intact epl fpl fdp edc wrist flexion/extension biceps triceps deltoid    RIGHT SHOULDER:  Shoulder Inspection: mild diffuse swelling of the right shoulder to the elbow, resolving ecchymosis mostly mid to distal arm/elbow.  Tender: diffuse shoulder, upper arm to the elbow.  Range of Motion:   Active: not assessed   Passive: not assessed.        X-RAY INTERPRETATION: 3 views right  shoulder 5/12/2021 were reviewed personally in clinic today with the patient. On my review, there is an impacted, transverse fracture of the humeral neck with comminution, with mild valgus displacement, minimally displaced greater tuberosity fracture. There is interval callus formation. Alignment grossly stable to previous images through [facility] on 4/26/2021.      ASSESSMENT: Erica Ramos is a 65 year old female, right  -hand dominant with mildly displaced right proximal humerus fracture s/p fall.      PLAN:   * reviewed xrays with patient. Based on the xrays, the fracture is in stable alignment and already seen some early healing, not complete healing. Likely 3 months for full healing.    * rest, ice, heat, pain control  * strict non weight bearing right upper extremity  * sling at all times, day and night  * will start Physical Therapy for passive range of motion, pendulums in 2 weeks.  * demonstrated pendulum exercises for her to do at home.  * reassess 4 weeks, sooner if needed, xrays right shoulder ( internal rotation, external rotation, Y views).  * she can call if she needs a refill of the oxycodone. Continue acetaminophen for pain as well.        Francesco Jovel M.D., M.S.  Dept. of Orthopaedic Surgery  [facility]      Again, thank you for allowing me to participate in the care of your patient.        Sincerely,        Francesco Jovel MD

## 2021-05-12 NOTE — PROGRESS NOTES
CHIEF COMPLAINT:   Chief Complaint   Patient presents with     Right Shoulder - Follow Up     Right shoulder fracture on 4/26/2.  She has been icing during the day and using Tylenol for daytime pain management. Pain is rated as a 2/10 and 6/10. She is only using the Oxycodone at night for pain management. Patient is right handed.      INJURY: right proximal humerus fracture  DATE of INJURY: 4/26/2021      HISTORY:  Erica Ramos is a 65 year old female, right  -hand dominant, who is seen in followup for right shoulder injury that occurred 2 weeks ago. Returns today overall feeling better. Using tylenol during the day and oxycodone at night. she's also been icing.    Injury from a trip and fall on 4/26/2021, landing on right upper extremity, face. Was seen at [facility], with xrays showing proximal humerus fracture. Also some facial lacerations that were treated.     Treatment with sling, ice, pain medications (oxycodone, tylenol).    No numbness and tingling.    Onset: Following acute injury.  Mechanism of injury:  Blow/fall 4/26/2021  Symptoms have been improving since that time.  Aggrevated by: movement  Relieved by: pain medications, sling, no movements.  Pain location: located throughout the shoulder joint/diffuse  Pain severity: 2/10  Pain quality: aching, sharp and throbbing  Frequency of symptoms: are constant  Associated symptoms: pain to the elbow.    Prior history of related problems: yes, right shoulder pain 2015; has rheumatoid arthritis so pain in most of her joints.  Medications:   Current Outpatient Medications:      adalimumab (HUMIRA) 40 MG/0.8ML prefilled syringe kit, Inject 40 mg Subcutaneous, Disp: , Rfl:      ALPRAZolam (XANAX) 0.5 MG tablet, Take 1 tablet (0.5 mg) by mouth 2 times daily as needed for anxiety - use sparingly (Patient not taking: Reported on 4/26/2021), Disp: 20 tablet, Rfl: 2     ASPIRIN ADULT LOW STRENGTH PO, , Disp: , Rfl:      CALCIUM-VITAMIN D PO, , Disp: , Rfl:  "     FLUoxetine (PROZAC) 20 MG capsule, TAKE 3 CAPSULES BY MOUTH EVERY DAY, Disp: 270 capsule, Rfl: 1     folic acid (FOLVITE) 1 MG tablet, Take 2 mg by mouth, Disp: , Rfl:      leucovorin (WELLCOVORIN) 5 MG tablet, , Disp: , Rfl:      losartan (COZAAR) 25 MG tablet, Take 1 tablet (25 mg) by mouth daily - DUE FOR LABS, Disp: 90 tablet, Rfl: 0     methotrexate 2.5 MG tablet CHEMO, Take 25 mg by mouth, Disp: , Rfl:      oxyCODONE-acetaminophen (PERCOCET) 5-325 MG tablet, Take 1 tablet by mouth every 6 hours as needed for severe pain, Disp: , Rfl:      pravastatin (PRAVACHOL) 40 MG tablet, Take 1 tablet (40 mg) by mouth daily - DUE FOR FOLLOW UP AND LABS IN MAY, Disp: 60 tablet, Rfl: 0     predniSONE (DELTASONE) 5 MG tablet, TAKE 2 TABLETS BY MOUTH ONCE DAILY WITH A MEAL., Disp: , Rfl: 3    Allergies: No Known Allergies      REVIEW OF SYSTEMS:   CONSTITUTIONAL:NEGATIVE for fever, chills, change in weight  INTEGUMENTARY/SKIN: NEGATIVE for worrisome rashes, moles or lesions  MUSCULOSKELETAL:See HPI above  NEURO: NEGATIVE for weakness, dizziness or paresthesias    PHYSICAL EXAM:  /84   Pulse 85   Ht 1.702 m (5' 7\")   Wt 88.9 kg (195 lb 14.4 oz)   BMI 30.68 kg/m     GENERAL APPEARANCE: healthy, alert, no distress  SKIN: diffuse right upper extremity ecchymosis, otherwise no suspicious lesions or rashes  NEURO: Normal strength and tone, mentation intact and speech normal  PSYCH:  mentation appears normal and affect normal, not anxious  RESPIRATORY: No increased work of breathing.  VASCULAR: Radial pulses 2+ and brisk cappillary refill       MUSCULOSKELETAL:      RIGHT UPPER EXTREMITY:  Sensation intact to light touch in median, radial, ulnar and axillary nerve distributions  Palpable 2+ radial pulse, brisk capillary refill to all fingers, wwp  Intact epl fpl fdp edc wrist flexion/extension biceps triceps deltoid    RIGHT SHOULDER:  Shoulder Inspection: mild diffuse swelling of the right shoulder to the elbow, " resolving ecchymosis mostly mid to distal arm/elbow.  Tender: diffuse shoulder, upper arm to the elbow.  Range of Motion:   Active: not assessed   Passive: not assessed.        X-RAY INTERPRETATION: 3 views right  shoulder 5/12/2021 were reviewed personally in clinic today with the patient. On my review, there is an impacted, transverse fracture of the humeral neck with comminution, with mild valgus displacement, minimally displaced greater tuberosity fracture. There is interval callus formation. Alignment grossly stable to previous images through [facility] on 4/26/2021.      ASSESSMENT: Erica Ramos is a 65 year old female, right  -hand dominant with mildly displaced right proximal humerus fracture s/p fall.      PLAN:   * reviewed xrays with patient. Based on the xrays, the fracture is in stable alignment and already seen some early healing, not complete healing. Likely 3 months for full healing.    * rest, ice, heat, pain control  * strict non weight bearing right upper extremity  * sling at all times, day and night  * will start Physical Therapy for passive range of motion, pendulums in 2 weeks.  * demonstrated pendulum exercises for her to do at home.  * reassess 4 weeks, sooner if needed, xrays right shoulder ( internal rotation, external rotation, Y views).  * she can call if she needs a refill of the oxycodone. Continue acetaminophen for pain as well.        Francesco Jovel M.D., M.S.  Dept. of Orthopaedic Surgery  [facility]

## 2021-05-17 DIAGNOSIS — I10 HYPERTENSION, GOAL BELOW 140/90: ICD-10-CM

## 2021-05-19 RX ORDER — LOSARTAN POTASSIUM 25 MG/1
25 TABLET ORAL DAILY
Qty: 90 TABLET | Refills: 0 | Status: SHIPPED | OUTPATIENT
Start: 2021-05-19 | End: 2021-05-20

## 2021-05-19 NOTE — PROGRESS NOTES
Leonardo Brunson is a 65 year old who presents for the following health issues    HPI     Hypertension Follow-up      Do you check your blood pressure regularly outside of the clinic? Yes     Are you following a low salt diet? Yes    Are your blood pressures ever more than 140 on the top number (systolic) OR more   than 90 on the bottom number (diastolic), for example 140/90? Yes      How many servings of fruits and vegetables do you eat daily?  2-3    On average, how many sweetened beverages do you drink each day (Examples: soda, juice, sweet tea, etc.  Do NOT count diet or artificially sweetened beverages)?   0    How many days per week do you exercise enough to make your heart beat faster? 3 or less    How many minutes a day do you exercise enough to make your heart beat faster? 9 or less    How many days per week do you miss taking your medication? 0    Ear Problem  - both ears, mainly right  - bleeding from right ear, itching, plugging  - hearing problems      No chest pain/sob/palps.      Review of Systems   Constitutional, HEENT, cardiovascular, pulmonary, GI, , musculoskeletal, neuro, skin, endocrine and psych systems are negative, except as otherwise noted.      Objective    BP (!) 148/82   Pulse 82   Temp 97.6  F (36.4  C) (Tympanic)   Resp 20   Wt 88.7 kg (195 lb 9.6 oz)   SpO2 92%   BMI 30.64 kg/m    Body mass index is 30.64 kg/m .  Physical Exam     Eye exam - right eye normal lid, conjunctiva, cornea, pupil and fundus, left eye normal lid, conjunctiva, cornea, pupil and fundus.  Thyroid not palpable, not enlarged, no nodules detected.  CHEST:chest clear to IPPA, no tachypnea, retractions or cyanosis and S1, S2 normal, no murmur, no gallop, rate regular.  ENT exam reveals - ENT exam normal, no neck nodes or sinus tenderness.    Erica was seen today for hypertension and ear problem.    Diagnoses and all orders for this visit:    Ear itching    Hypertension, goal below 140/90  -      losartan (COZAAR) 50 MG tablet; Take 1 tablet (50 mg) by mouth daily    Other orders  -     REVIEW OF HEALTH MAINTENANCE PROTOCOL ORDERS    Advised supportive and symptomatic treatment.  Follow up with Provider - if condition persists or worsens.   work on lifestyle modification  Recheck in 1 mos

## 2021-05-20 ENCOUNTER — OFFICE VISIT (OUTPATIENT)
Dept: FAMILY MEDICINE | Facility: CLINIC | Age: 66
End: 2021-05-20
Payer: COMMERCIAL

## 2021-05-20 VITALS
BODY MASS INDEX: 30.64 KG/M2 | HEART RATE: 82 BPM | TEMPERATURE: 97.6 F | RESPIRATION RATE: 20 BRPM | DIASTOLIC BLOOD PRESSURE: 82 MMHG | WEIGHT: 195.6 LBS | OXYGEN SATURATION: 92 % | SYSTOLIC BLOOD PRESSURE: 148 MMHG

## 2021-05-20 DIAGNOSIS — I10 HYPERTENSION, GOAL BELOW 140/90: ICD-10-CM

## 2021-05-20 DIAGNOSIS — L29.9 EAR ITCHING: Primary | ICD-10-CM

## 2021-05-20 PROCEDURE — 99213 OFFICE O/P EST LOW 20 MIN: CPT | Performed by: PHYSICIAN ASSISTANT

## 2021-05-20 RX ORDER — LOSARTAN POTASSIUM 50 MG/1
50 TABLET ORAL DAILY
Qty: 30 TABLET | Refills: 1 | Status: SHIPPED | OUTPATIENT
Start: 2021-05-20 | End: 2021-06-18

## 2021-05-21 DIAGNOSIS — E78.5 HYPERLIPIDEMIA LDL GOAL <130: ICD-10-CM

## 2021-05-24 RX ORDER — PRAVASTATIN SODIUM 40 MG
40 TABLET ORAL DAILY
Qty: 30 TABLET | Refills: 0 | Status: SHIPPED | OUTPATIENT
Start: 2021-05-24 | End: 2021-06-18

## 2021-05-24 NOTE — TELEPHONE ENCOUNTER
Routing refill request to provider for review/approval because:  Patient needs to be seen because:  Pt overdue for fasting lab    Medication pended for approval, 30 day supply with reminder.

## 2021-05-26 NOTE — PROGRESS NOTES
"Physical Therapy Initial Evaluation  Subjective:  The history is provided by the patient. No  was used.   Therapist Generated HPI Evaluation  Problem details: 04/26/2021 pt fell and sustained proximal humerus fracture.  \"All of a sudden I just fell.\"  Referred to PT 05/12/2021.  Pt notes having periodic balance issues since brain aneurysm repair 14 years ago.         Type of problem:  Right shoulder.    This is a new condition.  Condition occurred with:  A fall.  Where condition occurred: at home.  Patient reports pain:  Upper arm.  Pain is described as aching and is intermittent.  Pain timing: no particular pattern re: time of day.  Since onset symptoms are gradually improving.  Exacerbated by: laying on the side, writing.  and relieved by ice (support / sling).  Imaging testing: see xrays in chart.        Patient Health History  Erica Ramos being seen for shoulder.       Problem occurred: fell   Pain is reported as 2/10 on pain scale.  General health as reported by patient is good.  Pertinent medical history includes: depression, high blood pressure, osteoporosis, rheumatoid arthritis and overweight.   Red flags:  None as reported by patient.  Medical allergies: none.    Other surgery history details: brain aneurysm coil.    Current medications:  Anti-depressants, bone density and high blood pressure medication.    Current occupation is PCA.                   Pt is R dominant.  Pt reports her goal is \"to be able to sleep on my side.\"                    Objective:  Standing Alignment:    Cervical/Thoracic:  Forward head  Shoulder/UE:  Rounded shoulders (pt arrives in sling)                                       Shoulder Evaluation:  ROM:  AROM:    Flexion:  Left:  140      Extension: Left: 77  Abduction:  Left: 143       Internal Rotation:  Left:  T10      External Rotation:  Left:  55                    PROM:    Flexion:  Right: 82 \"tight and sore\"    Extension:  Right:  70 " negative  Abduction:  Right:  63 positive    Internal Rotation:  Right:  L5 negative  External Rotation:  Right:  20 positive                          Palpation:  Palpation assessed shoulder: diffuse tenderness to palpation R proximal upper arm without precision.                                         General     ROS    Assessment/Plan:    Patient is a 65 year old female with right side shoulder complaints.    Patient has the following significant findings with corresponding treatment plan.                Diagnosis 1:  R shoulder pain with underlying humeral fracture  Pain -  hot/cold therapy  Decreased ROM/flexibility - manual therapy and therapeutic exercise  Decreased strength (assumed) - therapeutic exercise and therapeutic activities  Decreased function - therapeutic activities  Impaired posture - neuro re-education    Therapy Evaluation Codes:   1) History comprised of:   Personal factors that impact the plan of care:      Past/current experiences and Time since onset of symptoms.    Comorbidity factors that impact the plan of care are:      Depression, Osteoarthritis, Rheumatoid arthritis and h/o brain aneurysm.     Medications impacting care: Anti-depressant and Bone density.  2) Examination of Body Systems comprised of:   Body structures and functions that impact the plan of care:      Shoulder.   Activity limitations that impact the plan of care are:      Dressing, Lifting and Sleeping.  3) Clinical presentation characteristics are:   Evolving/Changing.  4) Decision-Making    Moderate complexity using standardized patient assessment instrument and/or measureable assessment of functional outcome.  Cumulative Therapy Evaluation is: Moderate complexity.    Previous and current functional limitations:  (See Goal Flow Sheet for this information)    Short term and Long term goals: (See Goal Flow Sheet for this information)     Communication ability:  Patient appears to be able to clearly communicate and  understand verbal and written communication and follow directions correctly.  Treatment Explanation - The following has been discussed with the patient:   RX ordered/plan of care  Anticipated outcomes  Possible risks and side effects  This patient would benefit from PT intervention to resume normal activities.   Rehab potential is good.    Frequency:  2 X week, once daily  Duration:  for 4 weeks tapering to 1 X a week over 4 weeks  Discharge Plan:  Achieve all LTG.  Independent in home treatment program.  Reach maximal therapeutic benefit.    Please refer to the daily flowsheet for treatment today, total treatment time and time spent performing 1:1 timed codes.

## 2021-05-27 ENCOUNTER — THERAPY VISIT (OUTPATIENT)
Dept: PHYSICAL THERAPY | Facility: CLINIC | Age: 66
End: 2021-05-27
Attending: ORTHOPAEDIC SURGERY
Payer: COMMERCIAL

## 2021-05-27 ENCOUNTER — TELEPHONE (OUTPATIENT)
Dept: FAMILY MEDICINE | Facility: CLINIC | Age: 66
End: 2021-05-27

## 2021-05-27 DIAGNOSIS — F32.A ANXIETY AND DEPRESSION: ICD-10-CM

## 2021-05-27 DIAGNOSIS — R26.89 BALANCE PROBLEMS: Primary | ICD-10-CM

## 2021-05-27 DIAGNOSIS — F41.9 ANXIETY AND DEPRESSION: ICD-10-CM

## 2021-05-27 DIAGNOSIS — S42.201D CLOSED TRAUMATIC DISPLACED FRACTURE OF PROXIMAL END OF RIGHT HUMERUS WITH ROUTINE HEALING, SUBSEQUENT ENCOUNTER: ICD-10-CM

## 2021-05-27 DIAGNOSIS — M25.511 ACUTE PAIN OF RIGHT SHOULDER: ICD-10-CM

## 2021-05-27 PROCEDURE — 97110 THERAPEUTIC EXERCISES: CPT | Performed by: PHYSICAL THERAPIST

## 2021-05-27 PROCEDURE — 97162 PT EVAL MOD COMPLEX 30 MIN: CPT | Performed by: PHYSICAL THERAPIST

## 2021-05-27 NOTE — Clinical Note
Dr Jovel has sent Ms Ramos to PT for her shoulder with the underlying humeral fracture.  As we were talking about her fall that caused the fracture, she mentioned being a little more off balance overall and wonders about the relationship with her history of brain aneurysm coil.  Thoughts on reconnecting with neurology on that?    -- Jorge

## 2021-05-27 NOTE — TELEPHONE ENCOUNTER
I called and spoke to patient, advised her of message and referral placed, she is in agreement to see neuro, phone number provided to patient to reach out to schedule.    Camille Perry RN  [facility]

## 2021-05-27 NOTE — TELEPHONE ENCOUNTER
Please call patient with the following info:    Jorge, with physical therapy, messaged me regarding her balance problems. I think it would be a good idea to see neurology, referral placed.

## 2021-05-28 ENCOUNTER — THERAPY VISIT (OUTPATIENT)
Dept: PHYSICAL THERAPY | Facility: CLINIC | Age: 66
End: 2021-05-28
Payer: COMMERCIAL

## 2021-05-28 DIAGNOSIS — M25.511 ACUTE PAIN OF RIGHT SHOULDER: ICD-10-CM

## 2021-05-28 PROCEDURE — 97110 THERAPEUTIC EXERCISES: CPT | Mod: GP | Performed by: PHYSICAL THERAPIST

## 2021-05-28 NOTE — TELEPHONE ENCOUNTER
Patient has refills on file (at requesting pharmacy)  to last until at least 6/29/21.     Patient has follow up visit scheduled on 6/18/21.     Current refill request refused.     Nuha Johnston RN BSN  [facility]

## 2021-06-02 DIAGNOSIS — F41.9 ANXIETY AND DEPRESSION: ICD-10-CM

## 2021-06-02 DIAGNOSIS — F32.A ANXIETY AND DEPRESSION: ICD-10-CM

## 2021-06-03 ENCOUNTER — THERAPY VISIT (OUTPATIENT)
Dept: PHYSICAL THERAPY | Facility: CLINIC | Age: 66
End: 2021-06-03
Payer: COMMERCIAL

## 2021-06-03 DIAGNOSIS — M25.511 ACUTE PAIN OF RIGHT SHOULDER: ICD-10-CM

## 2021-06-03 PROCEDURE — 97110 THERAPEUTIC EXERCISES: CPT | Mod: GP | Performed by: PHYSICAL THERAPIST

## 2021-06-03 NOTE — TELEPHONE ENCOUNTER
Patient should have enough medication to get to next appointment. See previous refill request.     Hoda Jacobs RN

## 2021-06-04 DIAGNOSIS — F32.A ANXIETY AND DEPRESSION: ICD-10-CM

## 2021-06-04 DIAGNOSIS — F41.9 ANXIETY AND DEPRESSION: ICD-10-CM

## 2021-06-04 NOTE — TELEPHONE ENCOUNTER
Patient called stating she took the last of her prozac and she does not have enough to get her through until her appointment with PCP on 6-18-21.  Refilled med for 15 day supply with keep appointment reminder.    PHQ 5/5/2020 6/9/2020 11/27/2020   PHQ-9 Total Score 15 0 0   Q9: Thoughts of better off dead/self-harm past 2 weeks Not at all Not at all Not at all

## 2021-06-07 ENCOUNTER — THERAPY VISIT (OUTPATIENT)
Dept: PHYSICAL THERAPY | Facility: CLINIC | Age: 66
End: 2021-06-07
Payer: COMMERCIAL

## 2021-06-07 DIAGNOSIS — M25.511 ACUTE PAIN OF RIGHT SHOULDER: ICD-10-CM

## 2021-06-07 PROCEDURE — 97110 THERAPEUTIC EXERCISES: CPT | Mod: GP | Performed by: PHYSICAL THERAPIST

## 2021-06-07 NOTE — PROGRESS NOTES
Subjective:  HPI  Physical Exam                    Objective:  System    Physical Exam    General     ROS    Assessment/Plan:    PROGRESS  REPORT    Progress reporting period is from 5-27-21 to 6-7-21, 4 visits since SOC.       SUBJECTIVE  Subjective changes noted by patient:  .  Subjective: Nannette states she is getting better. She was able to sleep on her L side for a short time with R arm propped up.      Current Pain level: (1-2/10).      Initial Pain level: 2/10.   Changes in function:  ROM progressing  Adverse reaction to treatment or activity: None    OBJECTIVE  Changes noted in objective findings:    Objective: Nannette is consistent with wearing her sling. Supine PROM R shoulder flx 98, abd 85 ER 25, IR 50(in 45 abd)     ASSESSMENT/PLAN  Updated problem list and treatment plan: Diagnosis 1:  S/p R humeral fx,  R shoulder pain  Pain -  hot/cold therapy, self management, education and home program  Decreased ROM/flexibility - therapeutic exercise and PROM only for now  Impaired muscle performance - home program and PROM only for now  Decreased function - home program  STG/LTGs have been met or progress has been made towards goals:  Limited by MD restrictions to PROM only for now  Assessment of Progress: The patient's condition is improving.  Self Management Plans:  Patient has been instructed in a home treatment program.  Patient  has been instructed in self management of symptoms.  I have re-evaluated this patient and find that the nature, scope, duration and intensity of the therapy is appropriate for the medical condition of the patient.  Erica continues to require the following intervention to meet STG and LTG's:  PT    Recommendations:  Nannette will f/u with Dr Jovel on 6-9-21. Please advise regarding restrictions moving forward.     Please refer to the daily flowsheet for treatment today, total treatment time and time spent performing 1:1 timed codes.

## 2021-06-09 ENCOUNTER — OFFICE VISIT (OUTPATIENT)
Dept: ORTHOPEDICS | Facility: CLINIC | Age: 66
End: 2021-06-09
Payer: COMMERCIAL

## 2021-06-09 ENCOUNTER — ANCILLARY PROCEDURE (OUTPATIENT)
Dept: GENERAL RADIOLOGY | Facility: CLINIC | Age: 66
End: 2021-06-09
Attending: ORTHOPAEDIC SURGERY
Payer: COMMERCIAL

## 2021-06-09 VITALS
HEART RATE: 83 BPM | BODY MASS INDEX: 30.06 KG/M2 | HEIGHT: 67 IN | WEIGHT: 191.5 LBS | SYSTOLIC BLOOD PRESSURE: 134 MMHG | DIASTOLIC BLOOD PRESSURE: 84 MMHG

## 2021-06-09 DIAGNOSIS — S42.201D CLOSED TRAUMATIC DISPLACED FRACTURE OF PROXIMAL END OF RIGHT HUMERUS WITH ROUTINE HEALING, SUBSEQUENT ENCOUNTER: ICD-10-CM

## 2021-06-09 DIAGNOSIS — S42.201D CLOSED TRAUMATIC DISPLACED FRACTURE OF PROXIMAL END OF RIGHT HUMERUS WITH ROUTINE HEALING, SUBSEQUENT ENCOUNTER: Primary | ICD-10-CM

## 2021-06-09 PROCEDURE — 73030 X-RAY EXAM OF SHOULDER: CPT | Mod: RT | Performed by: RADIOLOGY

## 2021-06-09 PROCEDURE — 99207 PR FRACTURE CARE IN GLOBAL PERIOD: CPT | Performed by: ORTHOPAEDIC SURGERY

## 2021-06-09 RX ORDER — ALENDRONATE SODIUM 70 MG/1
70 TABLET ORAL
COMMUNITY
Start: 2021-06-02 | End: 2022-12-08

## 2021-06-09 ASSESSMENT — MIFFLIN-ST. JEOR: SCORE: 1451.27

## 2021-06-09 ASSESSMENT — PAIN SCALES - GENERAL: PAINLEVEL: NO PAIN (0)

## 2021-06-09 NOTE — LETTER
6/9/2021         RE: Erica Ramos  [address]  [place] [address]        Dear Colleague,    Thank you for referring your patient, rEica Ramos, to the [facility]. Please see a copy of my visit note below.    CHIEF COMPLAINT:   Chief Complaint   Patient presents with     Right Shoulder - RECHECK     Proximal humerus fracture. DOI 4/26/21, 6 wk s/p. Patient notes her shoulder is feeling much better. She continues to wear the sling. She continues with physical therapy two times a week, going well.      INJURY: right proximal humerus fracture  DATE of INJURY: 4/26/2021      HISTORY:  Erica Ramos is a 65 year old female, right  -hand dominant, who is seen in followup for right shoulder injury that occurred 6 weeks ago. Returns today overall feeling better. She continues with the sling. She's going to Physical Therapy, which is helping. Pain 0/10 today.    Injury from a trip and fall on 4/26/2021, landing on right upper extremity, face. Was seen at [facility], with xrays showing proximal humerus fracture. Also some facial lacerations that were treated.     Treatment with sling, ice, pain medications (oxycodone, tylenol).    No numbness and tingling.    Onset: Following acute injury.  Mechanism of injury:  Blow/fall 4/26/2021  Symptoms have been improving since that time.      Prior history of related problems: yes, right shoulder pain 2015; has rheumatoid arthritis so pain in most of her joints.  Medications:   Current Outpatient Medications:      adalimumab (HUMIRA) 40 MG/0.8ML prefilled syringe kit, Inject 40 mg Subcutaneous, Disp: , Rfl:      ASPIRIN ADULT LOW STRENGTH PO, , Disp: , Rfl:      CALCIUM-VITAMIN D PO, , Disp: , Rfl:      FLUoxetine (PROZAC) 20 MG capsule, TAKE 3 CAPSULES BY MOUTH EVERY DAY  Keep upcoming appointment for further refills., Disp: 45 capsule, Rfl: 0     folic acid (FOLVITE) 1 MG tablet, Take 2 mg by mouth, Disp: , Rfl:  "     leucovorin (WELLCOVORIN) 5 MG tablet, , Disp: , Rfl:      losartan (COZAAR) 50 MG tablet, Take 1 tablet (50 mg) by mouth daily, Disp: 30 tablet, Rfl: 1     methotrexate 2.5 MG tablet CHEMO, Take 25 mg by mouth, Disp: , Rfl:      pravastatin (PRAVACHOL) 40 MG tablet, Take 1 tablet (40 mg) by mouth daily, Disp: 30 tablet, Rfl: 0    Allergies: No Known Allergies      REVIEW OF SYSTEMS:   CONSTITUTIONAL:NEGATIVE for fever, chills, change in weight  INTEGUMENTARY/SKIN: NEGATIVE for worrisome rashes, moles or lesions  MUSCULOSKELETAL:See HPI above  NEURO: NEGATIVE for weakness, dizziness or paresthesias    PHYSICAL EXAM:  /84   Pulse 83   Ht 1.702 m (5' 7\")   Wt 86.9 kg (191 lb 8 oz)   BMI 29.99 kg/m     GENERAL APPEARANCE: healthy, alert, no distress  SKIN: diffuse right upper extremity ecchymosis, otherwise no suspicious lesions or rashes  NEURO: Normal strength and tone, mentation intact and speech normal  PSYCH:  mentation appears normal and affect normal, not anxious  RESPIRATORY: No increased work of breathing.  VASCULAR: Radial pulses 2+ and brisk cappillary refill       MUSCULOSKELETAL:      RIGHT UPPER EXTREMITY:  Sensation intact to light touch in median, radial, ulnar and axillary nerve distributions  Palpable 2+ radial pulse, brisk capillary refill to all fingers, wwp  Intact epl fpl fdp edc wrist flexion/extension biceps triceps deltoid    RIGHT SHOULDER:  Shoulder Inspection: mild diffuse swelling of the right shoulder to the elbow, resolving ecchymosis mostly mid to distal arm/elbow.  Tender: diffuse shoulder, upper arm to the elbow.  Range of Motion:   Active: flexion 30 degrees, external rotation 10 degrees.   Passive: flexion 90 degrees, external rotation 30 degrees.  Strength: 4/5        X-RAY INTERPRETATION: 3 views right  shoulder 6/9/2021  were reviewed personally in clinic today with the patient. On my review, there is an impacted, transverse fracture of the humeral neck with " comminution, with mild valgus displacement, minimally displaced greater tuberosity fracture. There is significant callus formation. Alignment grossly stable to previous images through [facility] on 4/26/2021.      ASSESSMENT: Erica Ramos is a 65 year old female, right  -hand dominant with mildly displaced right proximal humerus fracture s/p fall, healing well.      PLAN:   * reviewed xrays with patient. Significant healing noted. Shoulder is stiff, as to be expected.  * at this time, will discontinue sling  * light use / weight bearing ok, ADLs.  * aggressive range of motion with home exercise program and Physical Therapy.  * will monitor strength, workup for rotator cuff injury in future as needed depending on progress with Physical Therapy.    * continue with Physical Therapy.    * reassess 4 weeks, sooner if needed, xrays right shoulder ( internal rotation, external rotation, Y views).          Francesco Jovel M.D., M.S.  Dept. of Orthopaedic Surgery  [facility]      Again, thank you for allowing me to participate in the care of your patient.        Sincerely,        Francesco Jovel MD

## 2021-06-09 NOTE — PROGRESS NOTES
CHIEF COMPLAINT:   Chief Complaint   Patient presents with     Right Shoulder - RECHECK     Proximal humerus fracture. DOI 4/26/21, 6 wk s/p. Patient notes her shoulder is feeling much better. She continues to wear the sling. She continues with physical therapy two times a week, going well.      INJURY: right proximal humerus fracture  DATE of INJURY: 4/26/2021      HISTORY:  Erica Ramos is a 65 year old female, right  -hand dominant, who is seen in followup for right shoulder injury that occurred 6 weeks ago. Returns today overall feeling better. She continues with the sling. She's going to Physical Therapy, which is helping. Pain 0/10 today.    Injury from a trip and fall on 4/26/2021, landing on right upper extremity, face. Was seen at [facility], with xrays showing proximal humerus fracture. Also some facial lacerations that were treated.     Treatment with sling, ice, pain medications (oxycodone, tylenol).    No numbness and tingling.    Onset: Following acute injury.  Mechanism of injury:  Blow/fall 4/26/2021  Symptoms have been improving since that time.      Prior history of related problems: yes, right shoulder pain 2015; has rheumatoid arthritis so pain in most of her joints.  Medications:   Current Outpatient Medications:      adalimumab (HUMIRA) 40 MG/0.8ML prefilled syringe kit, Inject 40 mg Subcutaneous, Disp: , Rfl:      ASPIRIN ADULT LOW STRENGTH PO, , Disp: , Rfl:      CALCIUM-VITAMIN D PO, , Disp: , Rfl:      FLUoxetine (PROZAC) 20 MG capsule, TAKE 3 CAPSULES BY MOUTH EVERY DAY  Keep upcoming appointment for further refills., Disp: 45 capsule, Rfl: 0     folic acid (FOLVITE) 1 MG tablet, Take 2 mg by mouth, Disp: , Rfl:      leucovorin (WELLCOVORIN) 5 MG tablet, , Disp: , Rfl:      losartan (COZAAR) 50 MG tablet, Take 1 tablet (50 mg) by mouth daily, Disp: 30 tablet, Rfl: 1     methotrexate 2.5 MG tablet CHEMO, Take 25 mg by mouth, Disp: , Rfl:      pravastatin (PRAVACHOL) 40 MG tablet,  "Take 1 tablet (40 mg) by mouth daily, Disp: 30 tablet, Rfl: 0    Allergies: No Known Allergies      REVIEW OF SYSTEMS:   CONSTITUTIONAL:NEGATIVE for fever, chills, change in weight  INTEGUMENTARY/SKIN: NEGATIVE for worrisome rashes, moles or lesions  MUSCULOSKELETAL:See HPI above  NEURO: NEGATIVE for weakness, dizziness or paresthesias    PHYSICAL EXAM:  /84   Pulse 83   Ht 1.702 m (5' 7\")   Wt 86.9 kg (191 lb 8 oz)   BMI 29.99 kg/m     GENERAL APPEARANCE: healthy, alert, no distress  SKIN: diffuse right upper extremity ecchymosis, otherwise no suspicious lesions or rashes  NEURO: Normal strength and tone, mentation intact and speech normal  PSYCH:  mentation appears normal and affect normal, not anxious  RESPIRATORY: No increased work of breathing.  VASCULAR: Radial pulses 2+ and brisk cappillary refill       MUSCULOSKELETAL:      RIGHT UPPER EXTREMITY:  Sensation intact to light touch in median, radial, ulnar and axillary nerve distributions  Palpable 2+ radial pulse, brisk capillary refill to all fingers, wwp  Intact epl fpl fdp edc wrist flexion/extension biceps triceps deltoid    RIGHT SHOULDER:  Shoulder Inspection: mild diffuse swelling of the right shoulder to the elbow, resolving ecchymosis mostly mid to distal arm/elbow.  Tender: diffuse shoulder, upper arm to the elbow.  Range of Motion:   Active: flexion 30 degrees, external rotation 10 degrees.   Passive: flexion 90 degrees, external rotation 30 degrees.  Strength: 4/5        X-RAY INTERPRETATION: 3 views right  shoulder 6/9/2021  were reviewed personally in clinic today with the patient. On my review, there is an impacted, transverse fracture of the humeral neck with comminution, with mild valgus displacement, minimally displaced greater tuberosity fracture. There is significant callus formation. [facility] grossly [facility].      ASSESSMENT: Erica Ramos is a 65 year old female, right  " -hand dominant with mildly displaced right proximal humerus fracture s/p fall, healing well.      PLAN:   * reviewed xrays with patient. Significant healing noted. Shoulder is stiff, as to be expected.  * at this time, will discontinue sling  * light use / weight bearing ok, ADLs.  * aggressive range of motion with home exercise program and Physical Therapy.  * will monitor strength, workup for rotator cuff injury in future as needed depending on progress with Physical Therapy.    * continue with Physical Therapy.    * reassess 4 weeks, sooner if needed, xrays right shoulder ( internal rotation, external rotation, Y views).          Francesco Jovel M.D., M.S.  Dept. of Orthopaedic Surgery  [facility]

## 2021-06-14 ENCOUNTER — THERAPY VISIT (OUTPATIENT)
Dept: PHYSICAL THERAPY | Facility: CLINIC | Age: 66
End: 2021-06-14
Payer: COMMERCIAL

## 2021-06-14 DIAGNOSIS — I10 HYPERTENSION, GOAL BELOW 140/90: ICD-10-CM

## 2021-06-14 DIAGNOSIS — M25.511 ACUTE PAIN OF RIGHT SHOULDER: ICD-10-CM

## 2021-06-14 PROCEDURE — 97110 THERAPEUTIC EXERCISES: CPT | Mod: GP | Performed by: PHYSICAL THERAPIST

## 2021-06-15 RX ORDER — LOSARTAN POTASSIUM 50 MG/1
TABLET ORAL
Qty: 30 TABLET | Refills: 1 | OUTPATIENT
Start: 2021-06-15

## 2021-06-17 ENCOUNTER — THERAPY VISIT (OUTPATIENT)
Dept: PHYSICAL THERAPY | Facility: CLINIC | Age: 66
End: 2021-06-17
Payer: COMMERCIAL

## 2021-06-17 DIAGNOSIS — M25.511 ACUTE PAIN OF RIGHT SHOULDER: ICD-10-CM

## 2021-06-17 DIAGNOSIS — E78.5 HYPERLIPIDEMIA LDL GOAL <130: ICD-10-CM

## 2021-06-17 PROCEDURE — 97110 THERAPEUTIC EXERCISES: CPT | Mod: GP

## 2021-06-18 ENCOUNTER — OFFICE VISIT (OUTPATIENT)
Dept: FAMILY MEDICINE | Facility: CLINIC | Age: 66
End: 2021-06-18
Payer: COMMERCIAL

## 2021-06-18 VITALS
OXYGEN SATURATION: 95 % | HEART RATE: 70 BPM | BODY MASS INDEX: 30.17 KG/M2 | RESPIRATION RATE: 16 BRPM | DIASTOLIC BLOOD PRESSURE: 84 MMHG | TEMPERATURE: 97 F | SYSTOLIC BLOOD PRESSURE: 150 MMHG | WEIGHT: 192.6 LBS

## 2021-06-18 DIAGNOSIS — E78.5 HYPERLIPIDEMIA LDL GOAL <130: ICD-10-CM

## 2021-06-18 DIAGNOSIS — M85.852 OSTEOPENIA OF BOTH HIPS: ICD-10-CM

## 2021-06-18 DIAGNOSIS — I10 HYPERTENSION, GOAL BELOW 140/90: ICD-10-CM

## 2021-06-18 DIAGNOSIS — Z12.31 ENCOUNTER FOR SCREENING MAMMOGRAM FOR BREAST CANCER: ICD-10-CM

## 2021-06-18 DIAGNOSIS — Z00.01 ENCOUNTER FOR ROUTINE ADULT MEDICAL EXAM WITH ABNORMAL FINDINGS: Primary | ICD-10-CM

## 2021-06-18 DIAGNOSIS — M05.9 RHEUMATOID ARTHRITIS WITH POSITIVE RHEUMATOID FACTOR, INVOLVING UNSPECIFIED SITE (H): ICD-10-CM

## 2021-06-18 DIAGNOSIS — M85.851 OSTEOPENIA OF BOTH HIPS: ICD-10-CM

## 2021-06-18 DIAGNOSIS — F41.9 ANXIETY AND DEPRESSION: ICD-10-CM

## 2021-06-18 DIAGNOSIS — F32.A ANXIETY AND DEPRESSION: ICD-10-CM

## 2021-06-18 LAB
ALBUMIN SERPL-MCNC: 3.9 G/DL (ref 3.4–5)
ALP SERPL-CCNC: 57 U/L (ref 40–150)
ALT SERPL W P-5'-P-CCNC: 25 U/L (ref 0–50)
ANION GAP SERPL CALCULATED.3IONS-SCNC: 3 MMOL/L (ref 3–14)
AST SERPL W P-5'-P-CCNC: 17 U/L (ref 0–45)
BILIRUB SERPL-MCNC: 0.5 MG/DL (ref 0.2–1.3)
BUN SERPL-MCNC: 14 MG/DL (ref 7–30)
CALCIUM SERPL-MCNC: 8.9 MG/DL (ref 8.5–10.1)
CHLORIDE SERPL-SCNC: 106 MMOL/L (ref 94–109)
CHOLEST SERPL-MCNC: 196 MG/DL
CO2 SERPL-SCNC: 30 MMOL/L (ref 20–32)
CREAT SERPL-MCNC: 0.64 MG/DL (ref 0.52–1.04)
GFR SERPL CREATININE-BSD FRML MDRD: >90 ML/MIN/{1.73_M2}
GLUCOSE SERPL-MCNC: 86 MG/DL (ref 70–99)
HDLC SERPL-MCNC: 73 MG/DL
LDLC SERPL CALC-MCNC: 109 MG/DL
NONHDLC SERPL-MCNC: 123 MG/DL
POTASSIUM SERPL-SCNC: 4.3 MMOL/L (ref 3.4–5.3)
PROT SERPL-MCNC: 7.6 G/DL (ref 6.8–8.8)
SODIUM SERPL-SCNC: 139 MMOL/L (ref 133–144)
TRIGL SERPL-MCNC: 68 MG/DL

## 2021-06-18 PROCEDURE — 99214 OFFICE O/P EST MOD 30 MIN: CPT | Mod: 25 | Performed by: PHYSICIAN ASSISTANT

## 2021-06-18 PROCEDURE — 80061 LIPID PANEL: CPT | Performed by: PHYSICIAN ASSISTANT

## 2021-06-18 PROCEDURE — 36415 COLL VENOUS BLD VENIPUNCTURE: CPT | Performed by: PHYSICIAN ASSISTANT

## 2021-06-18 PROCEDURE — 99396 PREV VISIT EST AGE 40-64: CPT | Performed by: PHYSICIAN ASSISTANT

## 2021-06-18 PROCEDURE — 80053 COMPREHEN METABOLIC PANEL: CPT | Performed by: PHYSICIAN ASSISTANT

## 2021-06-18 RX ORDER — PRAVASTATIN SODIUM 40 MG
40 TABLET ORAL DAILY
Qty: 90 TABLET | Refills: 3 | Status: SHIPPED | OUTPATIENT
Start: 2021-06-18 | End: 2022-06-09

## 2021-06-18 RX ORDER — LOSARTAN POTASSIUM 50 MG/1
50 TABLET ORAL DAILY
Qty: 90 TABLET | Refills: 3 | Status: SHIPPED | OUTPATIENT
Start: 2021-06-18 | End: 2022-05-18

## 2021-06-18 ASSESSMENT — ANXIETY QUESTIONNAIRES
2. NOT BEING ABLE TO STOP OR CONTROL WORRYING: NOT AT ALL
7. FEELING AFRAID AS IF SOMETHING AWFUL MIGHT HAPPEN: NOT AT ALL
6. BECOMING EASILY ANNOYED OR IRRITABLE: NOT AT ALL
1. FEELING NERVOUS, ANXIOUS, OR ON EDGE: NOT AT ALL
5. BEING SO RESTLESS THAT IT IS HARD TO SIT STILL: NOT AT ALL
IF YOU CHECKED OFF ANY PROBLEMS ON THIS QUESTIONNAIRE, HOW DIFFICULT HAVE THESE PROBLEMS MADE IT FOR YOU TO DO YOUR WORK, TAKE CARE OF THINGS AT HOME, OR GET ALONG WITH OTHER PEOPLE: NOT DIFFICULT AT ALL
3. WORRYING TOO MUCH ABOUT DIFFERENT THINGS: NOT AT ALL
GAD7 TOTAL SCORE: 0

## 2021-06-18 ASSESSMENT — PATIENT HEALTH QUESTIONNAIRE - PHQ9
5. POOR APPETITE OR OVEREATING: NOT AT ALL
SUM OF ALL RESPONSES TO PHQ QUESTIONS 1-9: 0
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
## 2021-06-19 RX ORDER — PRAVASTATIN SODIUM 40 MG
TABLET ORAL
Qty: 30 TABLET | Refills: 0 | OUTPATIENT
Start: 2021-06-19

## 2021-06-19 ASSESSMENT — ANXIETY QUESTIONNAIRES: GAD7 TOTAL SCORE: 0

## 2021-06-22 ENCOUNTER — THERAPY VISIT (OUTPATIENT)
Dept: PHYSICAL THERAPY | Facility: CLINIC | Age: 66
End: 2021-06-22
Payer: COMMERCIAL

## 2021-06-22 DIAGNOSIS — M25.511 ACUTE PAIN OF RIGHT SHOULDER: ICD-10-CM

## 2021-06-22 PROCEDURE — 97110 THERAPEUTIC EXERCISES: CPT | Mod: GP | Performed by: PHYSICAL THERAPIST

## 2021-06-22 NOTE — PROGRESS NOTES
"Subjective:  HPI  Physical Exam                    Objective:  System    Physical Exam    General     ROS    Assessment/Plan:    SUBJECTIVE  Subjective: Pt reports pain is definitely getting better.  Also feels like motion is easier.  \"It seems to be getting better everyday.\"   Current Pain level: 0/10   Changes in function:  None     Adverse reaction to treatment or activity:  None    OBJECTIVE  Objective: PROM R shoulder flexion 121, abduction 118.     ASSESSMENT  Erica continues to require intervention to meet STG and LTG's: PT  Patient is progressing as expected.  Response to therapy has shown an improvement in  ROM   Progress made towards STG/LTG?  Yes (See Goal flowsheet attached for updates on achievement of STG and LTG)    PLAN  Continue PROM work leading up to visit with Dr Jovel 07/07.    PTA/ATC plan:  N/A    Please refer to the daily flowsheet for treatment today, total treatment time and time spent performing 1:1 timed codes.        "

## 2021-06-24 ENCOUNTER — THERAPY VISIT (OUTPATIENT)
Dept: PHYSICAL THERAPY | Facility: CLINIC | Age: 66
End: 2021-06-24
Payer: COMMERCIAL

## 2021-06-24 DIAGNOSIS — M25.511 ACUTE PAIN OF RIGHT SHOULDER: ICD-10-CM

## 2021-06-24 PROCEDURE — 97110 THERAPEUTIC EXERCISES: CPT | Mod: GP

## 2021-06-29 ENCOUNTER — THERAPY VISIT (OUTPATIENT)
Dept: PHYSICAL THERAPY | Facility: CLINIC | Age: 66
End: 2021-06-29
Payer: COMMERCIAL

## 2021-06-29 DIAGNOSIS — M25.511 ACUTE PAIN OF RIGHT SHOULDER: ICD-10-CM

## 2021-06-29 PROCEDURE — 97110 THERAPEUTIC EXERCISES: CPT | Mod: GP | Performed by: PHYSICAL THERAPIST

## 2021-06-29 PROCEDURE — 97140 MANUAL THERAPY 1/> REGIONS: CPT | Mod: GP | Performed by: PHYSICAL THERAPIST

## 2021-07-01 ENCOUNTER — THERAPY VISIT (OUTPATIENT)
Dept: PHYSICAL THERAPY | Facility: CLINIC | Age: 66
End: 2021-07-01
Payer: COMMERCIAL

## 2021-07-01 DIAGNOSIS — M25.511 ACUTE PAIN OF RIGHT SHOULDER: ICD-10-CM

## 2021-07-01 PROCEDURE — 97110 THERAPEUTIC EXERCISES: CPT | Mod: GP | Performed by: PHYSICAL THERAPIST

## 2021-07-01 NOTE — Clinical Note
Scheduled next week with you.  Still pretty stiff but symptoms controlled.  How soon would you advise getting into more load with strengthening?

## 2021-07-01 NOTE — PROGRESS NOTES
Subjective:  HPI  Physical Exam                    Objective:  System    Physical Exam    General     ROS    Assessment/Plan:    PROGRESS  REPORT    Progress reporting period is from 05/27/2021 to 07/01/2021.       SUBJECTIVE  Subjective: Pt reports it has been feeling better to move the arm and use it.    Current Pain level: 0/10.     Initial Pain level: 2/10.   Changes in function:  Yes (See Goal flowsheet attached for changes in current functional level)  Adverse reaction to treatment or activity: None    OBJECTIVE  Objective: PROM R shoulder flexion 135, abduction 134, ER 50, IR T11.     ASSESSMENT/PLAN  Updated problem list and treatment plan: Diagnosis 1:  S/p R humeral fracture -- see plan below  STG/LTGs have been met or progress has been made towards goals:  Yes (See Goal flow sheet completed today.)  Assessment of Progress: The patient's condition is improving.  Self Management Plans:  Patient has been instructed in a home treatment program.  I have re-evaluated this patient and find that the nature, scope, duration and intensity of the therapy is appropriate for the medical condition of the patient.  Erica continues to require the following intervention to meet STG and LTG's:  PT    Recommendations:  Progressing ROM, although still stiff.  F/u with Dr Jovel as scheduled.  Anticipate continued work at ROM with progression into strengthening with aggressiveness as advised.    Please refer to the daily flowsheet for treatment today, total treatment time and time spent performing 1:1 timed codes.

## 2021-07-07 ENCOUNTER — OFFICE VISIT (OUTPATIENT)
Dept: ORTHOPEDICS | Facility: CLINIC | Age: 66
End: 2021-07-07
Payer: COMMERCIAL

## 2021-07-07 ENCOUNTER — ANCILLARY PROCEDURE (OUTPATIENT)
Dept: GENERAL RADIOLOGY | Facility: CLINIC | Age: 66
End: 2021-07-07
Attending: ORTHOPAEDIC SURGERY
Payer: COMMERCIAL

## 2021-07-07 VITALS
BODY MASS INDEX: 30.13 KG/M2 | HEIGHT: 67 IN | HEART RATE: 79 BPM | SYSTOLIC BLOOD PRESSURE: 143 MMHG | DIASTOLIC BLOOD PRESSURE: 85 MMHG | WEIGHT: 192 LBS

## 2021-07-07 DIAGNOSIS — S42.201D CLOSED TRAUMATIC DISPLACED FRACTURE OF PROXIMAL END OF RIGHT HUMERUS WITH ROUTINE HEALING, SUBSEQUENT ENCOUNTER: Primary | ICD-10-CM

## 2021-07-07 DIAGNOSIS — S42.201D CLOSED TRAUMATIC DISPLACED FRACTURE OF PROXIMAL END OF RIGHT HUMERUS WITH ROUTINE HEALING, SUBSEQUENT ENCOUNTER: ICD-10-CM

## 2021-07-07 PROCEDURE — 73030 X-RAY EXAM OF SHOULDER: CPT | Mod: RT | Performed by: RADIOLOGY

## 2021-07-07 PROCEDURE — 99207 PR FRACTURE CARE IN GLOBAL PERIOD: CPT | Performed by: ORTHOPAEDIC SURGERY

## 2021-07-07 ASSESSMENT — PAIN SCALES - GENERAL: PAINLEVEL: NO PAIN (0)

## 2021-07-07 ASSESSMENT — MIFFLIN-ST. JEOR: SCORE: 1453.54

## 2021-07-07 NOTE — PROGRESS NOTES
"CHIEF COMPLAINT:   Chief Complaint   Patient presents with     Right Shoulder - Follow Up     Proximal humerus fracture. DOI 4/26/21  Patient notes her shoulder is feeling much better and she is doing great! She continues with physical therapy two times a week, going well. He last visit was last Thursday.           INJURY: right proximal humerus fracture  DATE of INJURY: 4/26/2021      HISTORY:  Erica Ramos is a 65 year old female, right  -hand dominant, who is seen in followup for right shoulder injury that occurred 10 weeks ago. Returns today feeling much better, doing \"great\". Has finished Physical Therapy 2x/week, which went well. She inquires about going back for another couple Physical Therapy sessions.    Injury from a trip and fall on 4/26/2021, landing on right upper extremity, face. Was seen at [facility], with xrays showing proximal humerus fracture. Also some facial lacerations that were treated.     Treatment with sling, ice, pain medications (oxycodone, tylenol).    No numbness and tingling.    Onset: Following acute injury.  Mechanism of injury:  Blow/fall 4/26/2021  Symptoms have been improving since that time.      Prior history of related problems: yes, right shoulder pain 2015; has rheumatoid arthritis so pain in most of her joints.  Medications:   Current Outpatient Medications:      adalimumab (HUMIRA) 40 MG/0.8ML prefilled syringe kit, Inject 40 mg Subcutaneous, Disp: , Rfl:      alendronate (FOSAMAX) 70 MG tablet, Take 70 mg by mouth, Disp: , Rfl:      ASPIRIN ADULT LOW STRENGTH PO, , Disp: , Rfl:      CALCIUM-VITAMIN D PO, , Disp: , Rfl:      FLUoxetine (PROZAC) 20 MG capsule, Take 3 capsules (60 mg) by mouth daily, Disp: 270 capsule, Rfl: 3     folic acid (FOLVITE) 1 MG tablet, Take 2 mg by mouth, Disp: , Rfl:      leucovorin (WELLCOVORIN) 5 MG tablet, , Disp: , Rfl:      losartan (COZAAR) 50 MG tablet, Take 1 tablet (50 mg) by mouth daily, Disp: 90 tablet, Rfl: 3     " "methotrexate 2.5 MG tablet CHEMO, Take 25 mg by mouth, Disp: , Rfl:      pravastatin (PRAVACHOL) 40 MG tablet, Take 1 tablet (40 mg) by mouth daily, Disp: 90 tablet, Rfl: 3    Allergies: No Known Allergies      REVIEW OF SYSTEMS:   CONSTITUTIONAL:NEGATIVE for fever, chills, change in weight  INTEGUMENTARY/SKIN: NEGATIVE for worrisome rashes, moles or lesions  MUSCULOSKELETAL:See HPI above  NEURO: NEGATIVE for weakness, dizziness or paresthesias    PHYSICAL EXAM:  BP (!) 143/85   Pulse 79   Ht 1.702 m (5' 7\")   Wt 87.1 kg (192 lb)   BMI 30.07 kg/m     GENERAL APPEARANCE: healthy, alert, no distress  SKIN: diffuse right upper extremity ecchymosis, otherwise no suspicious lesions or rashes  NEURO: Normal strength and tone, mentation intact and speech normal  PSYCH:  mentation appears normal and affect normal, not anxious  RESPIRATORY: No increased work of breathing.  VASCULAR: Radial pulses 2+ and brisk cappillary refill       MUSCULOSKELETAL:      RIGHT UPPER EXTREMITY:  Sensation intact to light touch in median, radial, ulnar and axillary nerve distributions  Palpable 2+ radial pulse, brisk capillary refill to all fingers, wwp  Intact epl fpl fdp edc wrist flexion/extension biceps triceps deltoid    RIGHT SHOULDER:  Shoulder Inspection: no Tender: diffuse shoulder, upper arm to the elbow.  Range of Motion:   Active: flexion 90 degrees, external rotation 40 degrees.   Passive: flexion 105 degrees, external rotation 50 degrees.  Strength: 4/5        X-RAY INTERPRETATION: 3 views right  Shoulder 7/7/2021  were reviewed personally in clinic today with the patient. On my review, there is an impacted, transverse fracture of the humeral neck with comminution, with mild valgus displacement, minimally displaced greater tuberosity fracture. There is significant callus and sclerosis formation. Alignment grossly stable to previous images through [facility] on 4/26/2021.      ASSESSMENT: Erica Ramos is a 65 year " old female, right  -hand dominant with mildly displaced right proximal humerus fracture s/p fall, healing well.      PLAN:   * reviewed xrays with patient. Significant healing noted. Shoulder is stiff, as to be expected, but improving  * at this time, will continue with aggressive home exercise program, Physical Therapy.  * weight bearing per comfort.  * aggressive range of motion with home exercise program and Physical Therapy. No restrictions on range of motion.  * will monitor strength, workup for rotator cuff injury in future as needed depending on progress with Physical Therapy.    * continue with Physical Therapy. New referral placed today.    * reassess 4 weeks, sooner if needed, xrays right shoulder ( internal rotation, external rotation, Y views).          Francesco Jovel M.D., M.S.  Dept. of Orthopaedic Surgery  [facility]

## 2021-07-07 NOTE — LETTER
"    7/7/2021         RE: Erica Ramos  [address]  [place] [address]        Dear Colleague,    Thank you for referring your patient, Erica Ramos, to the [facility]. Please see a copy of my visit note below.    CHIEF COMPLAINT:   Chief Complaint   Patient presents with     Right Shoulder - Follow Up     Proximal humerus fracture. DOI 4/26/21  Patient notes her shoulder is feeling much better and she is doing great! She continues with physical therapy two times a week, going well. He last visit was last Thursday.           INJURY: right proximal humerus fracture  DATE of INJURY: 4/26/2021      HISTORY:  Erica Ramos is a 65 year old female, right  -hand dominant, who is seen in followup for right shoulder injury that occurred 10 weeks ago. Returns today feeling much better, doing \"great\". Has finished Physical Therapy 2x/week, which went well. She inquires about going back for another couple Physical Therapy sessions.    Injury from a trip and fall on 4/26/2021, landing on right upper extremity, face. Was seen at [facility], with xrays showing proximal humerus fracture. Also some facial lacerations that were treated.     Treatment with sling, ice, pain medications (oxycodone, tylenol).    No numbness and tingling.    Onset: Following acute injury.  Mechanism of injury:  Blow/fall 4/26/2021  Symptoms have been improving since that time.      Prior history of related problems: yes, right shoulder pain 2015; has rheumatoid arthritis so pain in most of her joints.  Medications:   Current Outpatient Medications:      adalimumab (HUMIRA) 40 MG/0.8ML prefilled syringe kit, Inject 40 mg Subcutaneous, Disp: , Rfl:      alendronate (FOSAMAX) 70 MG tablet, Take 70 mg by mouth, Disp: , Rfl:      ASPIRIN ADULT LOW STRENGTH PO, , Disp: , Rfl:      CALCIUM-VITAMIN D PO, , Disp: , Rfl:      FLUoxetine (PROZAC) 20 MG capsule, Take 3 capsules (60 mg) by mouth " "daily, Disp: 270 capsule, Rfl: 3     folic acid (FOLVITE) 1 MG tablet, Take 2 mg by mouth, Disp: , Rfl:      leucovorin (WELLCOVORIN) 5 MG tablet, , Disp: , Rfl:      losartan (COZAAR) 50 MG tablet, Take 1 tablet (50 mg) by mouth daily, Disp: 90 tablet, Rfl: 3     methotrexate 2.5 MG tablet CHEMO, Take 25 mg by mouth, Disp: , Rfl:      pravastatin (PRAVACHOL) 40 MG tablet, Take 1 tablet (40 mg) by mouth daily, Disp: 90 tablet, Rfl: 3    Allergies: No Known Allergies      REVIEW OF SYSTEMS:   CONSTITUTIONAL:NEGATIVE for fever, chills, change in weight  INTEGUMENTARY/SKIN: NEGATIVE for worrisome rashes, moles or lesions  MUSCULOSKELETAL:See HPI above  NEURO: NEGATIVE for weakness, dizziness or paresthesias    PHYSICAL EXAM:  BP (!) 143/85   Pulse 79   Ht 1.702 m (5' 7\")   Wt 87.1 kg (192 lb)   BMI 30.07 kg/m     GENERAL APPEARANCE: healthy, alert, no distress  SKIN: diffuse right upper extremity ecchymosis, otherwise no suspicious lesions or rashes  NEURO: Normal strength and tone, mentation intact and speech normal  PSYCH:  mentation appears normal and affect normal, not anxious  RESPIRATORY: No increased work of breathing.  VASCULAR: Radial pulses 2+ and brisk cappillary refill       MUSCULOSKELETAL:      RIGHT UPPER EXTREMITY:  Sensation intact to light touch in median, radial, ulnar and axillary nerve distributions  Palpable 2+ radial pulse, brisk capillary refill to all fingers, wwp  Intact epl fpl fdp edc wrist flexion/extension biceps triceps deltoid    RIGHT SHOULDER:  Shoulder Inspection: no Tender: diffuse shoulder, upper arm to the elbow.  Range of Motion:   Active: flexion 90 degrees, external rotation 40 degrees.   Passive: flexion 105 degrees, external rotation 50 degrees.  Strength: 4/5        X-RAY INTERPRETATION: 3 views right  Shoulder 7/7/2021  were reviewed personally in clinic today with the patient. On my review, there is an impacted, transverse fracture of the humeral neck with comminution, " with mild valgus displacement, minimally displaced greater tuberosity fracture. There is significant callus and sclerosis formation. Alignment grossly stable to previous images through [facility] on 4/26/2021.      ASSESSMENT: Erica Ramos is a 65 year old female, right  -hand dominant with mildly displaced right proximal humerus fracture s/p fall, healing well.      PLAN:   * reviewed xrays with patient. Significant healing noted. Shoulder is stiff, as to be expected, but improving  * at this time, will continue with aggressive home exercise program, Physical Therapy.  * weight bearing per comfort.  * aggressive range of motion with home exercise program and Physical Therapy. No restrictions on range of motion.  * will monitor strength, workup for rotator cuff injury in future as needed depending on progress with Physical Therapy.    * continue with Physical Therapy. New referral placed today.    * reassess 4 weeks, sooner if needed, xrays right shoulder ( internal rotation, external rotation, Y views).          Francesco Jovel M.D., M.S.  Dept. of Orthopaedic Surgery  [facility]      Again, thank you for allowing me to participate in the care of your patient.        Sincerely,        Francesco Jovel MD

## 2021-07-12 ENCOUNTER — THERAPY VISIT (OUTPATIENT)
Dept: PHYSICAL THERAPY | Facility: CLINIC | Age: 66
End: 2021-07-12
Payer: COMMERCIAL

## 2021-07-12 DIAGNOSIS — S42.201D CLOSED TRAUMATIC DISPLACED FRACTURE OF PROXIMAL END OF RIGHT HUMERUS WITH ROUTINE HEALING, SUBSEQUENT ENCOUNTER: ICD-10-CM

## 2021-07-12 DIAGNOSIS — M25.511 ACUTE PAIN OF RIGHT SHOULDER: ICD-10-CM

## 2021-07-12 PROCEDURE — 97110 THERAPEUTIC EXERCISES: CPT | Mod: GP

## 2021-07-19 ENCOUNTER — THERAPY VISIT (OUTPATIENT)
Dept: PHYSICAL THERAPY | Facility: CLINIC | Age: 66
End: 2021-07-19
Payer: COMMERCIAL

## 2021-07-19 DIAGNOSIS — M25.511 ACUTE PAIN OF RIGHT SHOULDER: ICD-10-CM

## 2021-07-19 PROCEDURE — 97110 THERAPEUTIC EXERCISES: CPT | Mod: GP

## 2021-07-26 ENCOUNTER — THERAPY VISIT (OUTPATIENT)
Dept: PHYSICAL THERAPY | Facility: CLINIC | Age: 66
End: 2021-07-26
Payer: COMMERCIAL

## 2021-07-26 DIAGNOSIS — M25.511 ACUTE PAIN OF RIGHT SHOULDER: ICD-10-CM

## 2021-07-26 PROCEDURE — 97110 THERAPEUTIC EXERCISES: CPT | Mod: GP

## 2021-07-26 PROCEDURE — 97112 NEUROMUSCULAR REEDUCATION: CPT | Mod: GP

## 2021-07-27 ENCOUNTER — TELEPHONE (OUTPATIENT)
Dept: FAMILY MEDICINE | Facility: CLINIC | Age: 66
End: 2021-07-27

## 2021-07-27 NOTE — TELEPHONE ENCOUNTER
"Patient cold calling, says she missed a step on cement steps yesterday, did not completely fall, caught herself but report her foot twisted underneath her.   Says her entire foot is swollen, thinks might have some broken toes.   Is too painful to bear weight and she's been getting \"pushed around\" on her walker.    I advise with description of injury and unable to bear weight, needs to go to /ER.   She says she plans to go to [facility] as is nearby.    will help her into the car.   I advised he inquire about getting a wheelchair from the entry before getting her out of car at ER entrance.    Patient verbalized understanding of and agreement with plan.    Camille Perry RN  [facility]        "

## 2021-07-30 ENCOUNTER — ANCILLARY PROCEDURE (OUTPATIENT)
Dept: MAMMOGRAPHY | Facility: CLINIC | Age: 66
End: 2021-07-30
Attending: PHYSICIAN ASSISTANT
Payer: COMMERCIAL

## 2021-07-30 DIAGNOSIS — Z12.31 ENCOUNTER FOR SCREENING MAMMOGRAM FOR BREAST CANCER: ICD-10-CM

## 2021-07-30 PROCEDURE — 77067 SCR MAMMO BI INCL CAD: CPT | Mod: TC | Performed by: RADIOLOGY

## 2021-08-03 NOTE — PROGRESS NOTES
Subjective:  HPI  Physical Exam                    Objective:  System    Physical Exam    General     ROS    Assessment/Plan:    PROGRESS  REPORT    Progress reporting period is from 05/27/2021 to 07/19/2021.       SUBJECTIVE  Subjective: pt reports she has been working her right arm this wknd  (pt feels she can do everything she like with her arm )    Current Pain level: 0/10.     Initial Pain level: 2/10.   Changes in function:  Yes (See Goal flowsheet attached for changes in current functional level)  Adverse reaction to treatment or activity: None    OBJECTIVE  Objective: pt moving her riht arm around very quickly, demo'd very large [place] ,AROM is demo'ing shld hike      ASSESSMENT/PLAN  Updated problem list and treatment plan: Diagnosis 1:  S/p humeral fracture -- see plan below  STG/LTGs have been met or progress has been made towards goals:  Yes (See Goal flow sheet completed today.)  Assessment of Progress: The patient's condition has potential to improve.  Self Management Plans:  Patient has been instructed in a home treatment program.  I have re-evaluated this patient and find that the nature, scope, duration and intensity of the therapy is appropriate for the medical condition of the patient.  Erica continues to require the following intervention to meet STG and LTG's:  PT    Recommendations:  Pleased that pt is doing well functionally.  Still has gains that can be made regarding motion and strength.  Continue PT six visits over six weeks.    Please refer to the daily flowsheet for treatment today, total treatment time and time spent performing 1:1 timed codes.

## 2021-08-04 ENCOUNTER — ANCILLARY PROCEDURE (OUTPATIENT)
Dept: GENERAL RADIOLOGY | Facility: CLINIC | Age: 66
End: 2021-08-04
Attending: ORTHOPAEDIC SURGERY
Payer: MEDICARE

## 2021-08-04 ENCOUNTER — OFFICE VISIT (OUTPATIENT)
Dept: ORTHOPEDICS | Facility: CLINIC | Age: 66
End: 2021-08-04
Payer: MEDICARE

## 2021-08-04 VITALS
BODY MASS INDEX: 30.07 KG/M2 | HEIGHT: 67 IN | DIASTOLIC BLOOD PRESSURE: 82 MMHG | HEART RATE: 87 BPM | SYSTOLIC BLOOD PRESSURE: 129 MMHG

## 2021-08-04 DIAGNOSIS — S42.201D CLOSED TRAUMATIC DISPLACED FRACTURE OF PROXIMAL END OF RIGHT HUMERUS WITH ROUTINE HEALING, SUBSEQUENT ENCOUNTER: Primary | ICD-10-CM

## 2021-08-04 DIAGNOSIS — S42.201D CLOSED TRAUMATIC DISPLACED FRACTURE OF PROXIMAL END OF RIGHT HUMERUS WITH ROUTINE HEALING, SUBSEQUENT ENCOUNTER: ICD-10-CM

## 2021-08-04 PROCEDURE — 99207 PR FRACTURE CARE IN GLOBAL PERIOD: CPT | Performed by: ORTHOPAEDIC SURGERY

## 2021-08-04 PROCEDURE — 73030 X-RAY EXAM OF SHOULDER: CPT | Mod: RT | Performed by: RADIOLOGY

## 2021-08-04 ASSESSMENT — PAIN SCALES - GENERAL: PAINLEVEL: NO PAIN (0)

## 2021-08-04 NOTE — PROGRESS NOTES
"CHIEF COMPLAINT:   Chief Complaint   Patient presents with     Right Shoulder - RECHECK     Proximal humerus fracture. DOI 4/26/21, 14 wk s/p. Patient notes her shoulder/humerus is doing good. Back to doing everything. Denies any pain.      INJURY: right proximal humerus fracture  DATE of INJURY: 4/26/2021      HISTORY:  Erica Ramos is a 65 year old female, right  -hand dominant, who is seen in followup for right shoulder injury that occurred 14 weeks ago. Returns today feeling much better, doing \"great\". Back doing pretty much everything now. No pain.    Injury from a trip and fall on 4/26/2021, landing on right upper extremity, face. Was seen at [facility], with xrays showing proximal humerus fracture. Also some facial lacerations that were treated.     Treatment with sling, ice, pain medications (oxycodone, tylenol).    No numbness and tingling.    Onset: Following acute injury.  Mechanism of injury:  Blow/fall 4/26/2021  Symptoms have been improving since that time.      Prior history of related problems: yes, right shoulder pain 2015; has rheumatoid arthritis so pain in most of her joints.  Medications:   Current Outpatient Medications:      adalimumab (HUMIRA) 40 MG/0.8ML prefilled syringe kit, Inject 40 mg Subcutaneous, Disp: , Rfl:      alendronate (FOSAMAX) 70 MG tablet, Take 70 mg by mouth, Disp: , Rfl:      ASPIRIN ADULT LOW STRENGTH PO, , Disp: , Rfl:      CALCIUM-VITAMIN D PO, , Disp: , Rfl:      FLUoxetine (PROZAC) 20 MG capsule, Take 3 capsules (60 mg) by mouth daily, Disp: 270 capsule, Rfl: 3     folic acid (FOLVITE) 1 MG tablet, Take 2 mg by mouth, Disp: , Rfl:      leucovorin (WELLCOVORIN) 5 MG tablet, , Disp: , Rfl:      losartan (COZAAR) 50 MG tablet, Take 1 tablet (50 mg) by mouth daily, Disp: 90 tablet, Rfl: 3     methotrexate 2.5 MG tablet CHEMO, Take 25 mg by mouth, Disp: , Rfl:      pravastatin (PRAVACHOL) 40 MG tablet, Take 1 tablet (40 mg) by mouth daily, Disp: 90 tablet, Rfl: " "3    Allergies: No Known Allergies      REVIEW OF SYSTEMS:   CONSTITUTIONAL:NEGATIVE for fever, chills, change in weight  INTEGUMENTARY/SKIN: NEGATIVE for worrisome rashes, moles or lesions  MUSCULOSKELETAL:See HPI above  NEURO: NEGATIVE for weakness, dizziness or paresthesias    PHYSICAL EXAM:  /82   Pulse 87   Ht 1.702 m (5' 7\")   BMI 30.07 kg/m     GENERAL APPEARANCE: healthy, alert, no distress  SKIN: diffuse right upper extremity ecchymosis, otherwise no suspicious lesions or rashes  NEURO: Normal strength and tone, mentation intact and speech normal  PSYCH:  mentation appears normal and affect normal, not anxious  RESPIRATORY: No increased work of breathing.  VASCULAR: Radial pulses 2+ and brisk cappillary refill       MUSCULOSKELETAL:      RIGHT UPPER EXTREMITY:  Sensation intact to light touch in median, radial, ulnar and axillary nerve distributions  Palpable 2+ radial pulse, brisk capillary refill to all fingers, wwp  Intact epl fpl fdp edc wrist flexion/extension biceps triceps deltoid    RIGHT SHOULDER:  Shoulder Inspection: no swelling. No ecchymosis. No deformity  Nontender to palpation.  Range of Motion:   Active: flexion 90 degrees, external rotation 40 degrees.   Passive: flexion 125 degrees, external rotation 50 degrees.  Strength: 4+/5        X-RAY INTERPRETATION: 3 views right  Shoulder 8/4/2021  were reviewed personally in clinic today with the patient. On my review, there is an impacted, transverse fracture of the humeral neck with comminution, with mild valgus displacement, minimally displaced greater tuberosity fracture. There is significant callus and sclerosis formation. Alignment grossly stable to previous images through [facility] on 4/26/2021.      ASSESSMENT: Erica Ramos is a 65 year old female, right  -hand dominant with healed right proximal humerus fracture s/p fall, stiffness.      PLAN:   * reviewed xrays with patient. Fracture essentially healed  * noted " stiffness on range of motion testing, needs to continue to work on regaining range of motion.  * some weakness, likely due to stiffness, injury, cannot completely rule out rotator cuff injury.  * at this time, will continue with aggressive home exercise program, Physical Therapy.  * weight bearing per comfort.  * aggressive range of motion with home exercise program and Physical Therapy. No restrictions on range of motion.    * return to clinic as needed.          Francesco Jovel M.D., M.S.  Dept. of Orthopaedic Surgery  [facility]

## 2021-08-04 NOTE — LETTER
"    8/4/2021         RE: Erica Ramos  [address]  [place] [address]        Dear Colleague,    Thank you for referring your patient, Erica Ramos, to the [facility] [place]. Please see a copy of my visit note below.    CHIEF COMPLAINT:   Chief Complaint   Patient presents with     Right Shoulder - RECHECK     Proximal humerus fracture. DOI 4/26/21, 14 wk s/p. Patient notes her shoulder/humerus is doing good. Back to doing everything. Denies any pain.      INJURY: right proximal humerus fracture  DATE of INJURY: 4/26/2021      HISTORY:  Erica Ramos is a 65 year old female, right  -hand dominant, who is seen in followup for right shoulder injury that occurred 14 weeks ago. Returns today feeling much better, doing \"great\". Back doing pretty much everything now. No pain.    Injury from a trip and fall on 4/26/2021, landing on right upper extremity, face. Was seen at [facility], with xrays showing proximal humerus fracture. Also some facial lacerations that were treated.     Treatment with sling, ice, pain medications (oxycodone, tylenol).    No numbness and tingling.    Onset: Following acute injury.  Mechanism of injury:  Blow/fall 4/26/2021  Symptoms have been improving since that time.      Prior history of related problems: yes, right shoulder pain 2015; has rheumatoid arthritis so pain in most of her joints.  Medications:   Current Outpatient Medications:      adalimumab (HUMIRA) 40 MG/0.8ML prefilled syringe kit, Inject 40 mg Subcutaneous, Disp: , Rfl:      alendronate (FOSAMAX) 70 MG tablet, Take 70 mg by mouth, Disp: , Rfl:      ASPIRIN ADULT LOW STRENGTH PO, , Disp: , Rfl:      CALCIUM-VITAMIN D PO, , Disp: , Rfl:      FLUoxetine (PROZAC) 20 MG capsule, Take 3 capsules (60 mg) by mouth daily, Disp: 270 capsule, Rfl: 3     folic acid (FOLVITE) 1 MG tablet, Take 2 mg by mouth, Disp: , Rfl:      leucovorin (WELLCOVORIN) 5 MG tablet, , Disp: , Rfl: " "     losartan (COZAAR) 50 MG tablet, Take 1 tablet (50 mg) by mouth daily, Disp: 90 tablet, Rfl: 3     methotrexate 2.5 MG tablet CHEMO, Take 25 mg by mouth, Disp: , Rfl:      pravastatin (PRAVACHOL) 40 MG tablet, Take 1 tablet (40 mg) by mouth daily, Disp: 90 tablet, Rfl: 3    Allergies: No Known Allergies      REVIEW OF SYSTEMS:   CONSTITUTIONAL:NEGATIVE for fever, chills, change in weight  INTEGUMENTARY/SKIN: NEGATIVE for worrisome rashes, moles or lesions  MUSCULOSKELETAL:See HPI above  NEURO: NEGATIVE for weakness, dizziness or paresthesias    PHYSICAL EXAM:  /82   Pulse 87   Ht 1.702 m (5' 7\")   BMI 30.07 kg/m     GENERAL APPEARANCE: healthy, alert, no distress  SKIN: diffuse right upper extremity ecchymosis, otherwise no suspicious lesions or rashes  NEURO: Normal strength and tone, mentation intact and speech normal  PSYCH:  mentation appears normal and affect normal, not anxious  RESPIRATORY: No increased work of breathing.  VASCULAR: Radial pulses 2+ and brisk cappillary refill       MUSCULOSKELETAL:      RIGHT UPPER EXTREMITY:  Sensation intact to light touch in median, radial, ulnar and axillary nerve distributions  Palpable 2+ radial pulse, brisk capillary refill to all fingers, wwp  Intact epl fpl fdp edc wrist flexion/extension biceps triceps deltoid    RIGHT SHOULDER:  Shoulder Inspection: no swelling. No ecchymosis. No deformity  Nontender to palpation.  Range of Motion:   Active: flexion 90 degrees, external rotation 40 degrees.   Passive: flexion 125 degrees, external rotation 50 degrees.  Strength: 4+/5        X-RAY INTERPRETATION: 3 views right  Shoulder 8/4/2021  were reviewed personally in clinic today with the patient. On my review, there is an impacted, transverse fracture of the humeral neck with comminution, with mild valgus displacement, minimally displaced greater tuberosity fracture. There is significant callus and sclerosis formation. Alignment grossly stable to previous " images through [facility] on 4/26/2021.      ASSESSMENT: Erica Ramos is a 65 year old female, right  -hand dominant with healed right proximal humerus fracture s/p fall, stiffness.      PLAN:   * reviewed xrays with patient. Fracture essentially healed  * noted stiffness on range of motion testing, needs to continue to work on regaining range of motion.  * some weakness, likely due to stiffness, injury, cannot completely rule out rotator cuff injury.  * at this time, will continue with aggressive home exercise program, Physical Therapy.  * weight bearing per comfort.  * aggressive range of motion with home exercise program and Physical Therapy. No restrictions on range of motion.    * return to clinic as needed.          Francesco Jovel M.D., M.S.  Dept. of Orthopaedic Surgery  [facility]      Again, thank you for allowing me to participate in the care of your patient.        Sincerely,        Francesco Jovel MD

## 2021-08-18 ENCOUNTER — OFFICE VISIT (OUTPATIENT)
Dept: NEUROLOGY | Facility: CLINIC | Age: 66
End: 2021-08-18
Attending: PHYSICIAN ASSISTANT
Payer: MEDICARE

## 2021-08-18 VITALS — SYSTOLIC BLOOD PRESSURE: 135 MMHG | DIASTOLIC BLOOD PRESSURE: 84 MMHG | HEART RATE: 91 BPM | RESPIRATION RATE: 20 BRPM

## 2021-08-18 DIAGNOSIS — R79.9 ABNORMAL FINDING OF BLOOD CHEMISTRY, UNSPECIFIED: ICD-10-CM

## 2021-08-18 DIAGNOSIS — G62.9 NEUROPATHY: ICD-10-CM

## 2021-08-18 DIAGNOSIS — R26.89 IMBALANCE: Primary | ICD-10-CM

## 2021-08-18 LAB
ERYTHROCYTE [DISTWIDTH] IN BLOOD BY AUTOMATED COUNT: 12.7 % (ref 10–15)
HBA1C MFR BLD: 5.4 % (ref 0–5.6)
HCT VFR BLD AUTO: 41.2 % (ref 35–47)
HGB BLD-MCNC: 13.9 G/DL (ref 11.7–15.7)
MCH RBC QN AUTO: 32.6 PG (ref 26.5–33)
MCHC RBC AUTO-ENTMCNC: 33.7 G/DL (ref 31.5–36.5)
MCV RBC AUTO: 97 FL (ref 78–100)
PLATELET # BLD AUTO: 440 10E3/UL (ref 150–450)
RBC # BLD AUTO: 4.26 10E6/UL (ref 3.8–5.2)
TOTAL PROTEIN SERUM FOR ELP: 7.8 G/DL (ref 6.8–8.8)
VIT B12 SERPL-MCNC: 550 PG/ML (ref 193–986)
WBC # BLD AUTO: 7.3 10E3/UL (ref 4–11)

## 2021-08-18 PROCEDURE — 36415 COLL VENOUS BLD VENIPUNCTURE: CPT | Performed by: INTERNAL MEDICINE

## 2021-08-18 PROCEDURE — 84155 ASSAY OF PROTEIN SERUM: CPT | Performed by: INTERNAL MEDICINE

## 2021-08-18 PROCEDURE — 83883 ASSAY NEPHELOMETRY NOT SPEC: CPT | Performed by: INTERNAL MEDICINE

## 2021-08-18 PROCEDURE — 99204 OFFICE O/P NEW MOD 45 MIN: CPT | Performed by: INTERNAL MEDICINE

## 2021-08-18 PROCEDURE — 82607 VITAMIN B-12: CPT | Performed by: INTERNAL MEDICINE

## 2021-08-18 PROCEDURE — 86334 IMMUNOFIX E-PHORESIS SERUM: CPT | Performed by: INTERNAL MEDICINE

## 2021-08-18 PROCEDURE — 85027 COMPLETE CBC AUTOMATED: CPT | Performed by: INTERNAL MEDICINE

## 2021-08-18 PROCEDURE — 83036 HEMOGLOBIN GLYCOSYLATED A1C: CPT | Performed by: INTERNAL MEDICINE

## 2021-08-18 PROCEDURE — 84165 PROTEIN E-PHORESIS SERUM: CPT | Performed by: STUDENT IN AN ORGANIZED HEALTH CARE EDUCATION/TRAINING PROGRAM

## 2021-08-18 RX ORDER — GABAPENTIN 100 MG/1
100 CAPSULE ORAL 2 TIMES DAILY
Qty: 120 CAPSULE | Refills: 2 | Status: SHIPPED | OUTPATIENT
Start: 2021-08-18 | End: 2023-10-31

## 2021-08-18 NOTE — LETTER
8/18/2021         RE: Erica Ramos  [address]  [place] [address]        Dear Colleague,    Thank you for referring your patient, Erica Ramos, to the [facility] [place]. Please see a copy of my visit note below.    [facility] Neurology Consultation    Erica Ramos MRN# [id number]   Age: 66 year old YOB: 1955     Requesting physician: Leonela Madrid     Reason for Consultation: imbalance      History of Presenting Symptoms:   Erica Ramos is a 66 year old female who presents today for evaluation of imbalance.     Symptoms have been going on for about the last year. She has had several falls which resulted in a broken shoulder and foot. She denies denies, but reports more of a sense of imbalance with walking.    She developed tingling in the feet, sensation of falling asleep. These has been going on for few years. Symptoms are more bothersome at night. Her hands intermittently fall asleep with certain activities like holding a book. She uses a numbing cream which can help.     She denies any incontinence of bowel or bladder. She doesn't know of any low back pain down the legs.     She had a brain aneurysm which ruptured in 2008. She had some mild balance problems after, but not to the degree she has now. She also had hearing problems.       Past Medical History:     Patient Active Problem List   Diagnosis     Obesity     Advanced directives, counseling/discussion     Hyperlipidemia LDL goal <130     Hypertension, goal below 140/90     Anxiety and depression     Rheumatoid arthritis with positive rheumatoid factor, involving unspecified site (H)     Cerebral aneurysm     Osteopenia of both hips     Acute pain of right shoulder     Past Medical History:   Diagnosis Date     Brain aneurysm      Depression, major      Hyperlipidaemia         Past Surgical History:     Past Surgical History:   Procedure Laterality  Date     COLONOSCOPY WITH CO2 INSUFFLATION N/A 2017    Procedure: COLONOSCOPY WITH CO2 INSUFFLATION;  COLON SCREEN/ KNAEBLE;  Surgeon: Romeo Almonte MD;  Location: [facility] OR     D & C      polyps     SURGICAL HISTORY OF     aneurysm repair        Social History:     Social History     Tobacco Use     Smoking status: Former Smoker     Quit date: 2006     Years since quittin.7     Smokeless tobacco: Never Used   Substance Use Topics     Alcohol use: No     Drug use: No        Family History:     Family History   Problem Relation Age of Onset     Cancer Mother         non hodgkins lymphoma     C.A.D. Father      Hypertension Father      Cerebrovascular Disease Father      Hypertension Brother      Cancer Daughter         lymphoma        Medications:     Current Outpatient Medications   Medication Sig     adalimumab (HUMIRA) 40 MG/0.8ML prefilled syringe kit Inject 40 mg Subcutaneous     alendronate (FOSAMAX) 70 MG tablet Take 70 mg by mouth     ASPIRIN ADULT LOW STRENGTH PO      CALCIUM-VITAMIN D PO      FLUoxetine (PROZAC) 20 MG capsule Take 3 capsules (60 mg) by mouth daily     folic acid (FOLVITE) 1 MG tablet Take 2 mg by mouth     leucovorin (WELLCOVORIN) 5 MG tablet      losartan (COZAAR) 50 MG tablet Take 1 tablet (50 mg) by mouth daily     methotrexate 2.5 MG tablet CHEMO Take 25 mg by mouth     pravastatin (PRAVACHOL) 40 MG tablet Take 1 tablet (40 mg) by mouth daily     No current facility-administered medications for this visit.        Allergies:   No Known Allergies     Review of Systems:   As above     Physical Exam:   Vitals: /84   Pulse 91   Resp 20    General: Seated comfortably in no acute distress.  HEENT: Optic discs sharp and vasculature normal on funduscopic exam.   Lungs: breathing comfortably  Extremities: no edema  Skin: No rashes  Neurologic:     Mental Status: Fully alert, attentive. Normal memory and fund of knowledge. Language normal, speech clear and  fluent, no paraphasic errors.     Cranial Nerves: Visual fields intact. PERRL. EOMI with normal smooth pursuit. Facial sensation intact/symmetric. Facial movements symmetric. Hearing not formally tested but intact to conversation. Palate elevation symmetric, uvula midline. No dysarthria. Shoulder shrug strong bilaterally. Tongue protrusion midline.     Motor: No tremors or other abnormal movements observed. Muscle tone normal throughout. Normal/symmetric rapid finger tapping. Strength 5/5 throughout upper and lower extremities.     Deep Tendon Reflexes: 2+/symmetric throughout upper and lower extremities. No clonus. Toes downgoing bilaterally.     Sensory: Sensitivity to light touch in the toes, otherwise normal. Decreased sensation to temperature and pinprick below the mid shins bilaterally. Vibration is 3-4 seconds in bilateral great toes, 5-6 seconds at ankles, normal in hands. Proprioception is intact throughout. Negative Romberg.      Coordination: Finger-nose-finger and heel-shin intact without dysmetria. Rapid alternating movements intact/symmetric with normal speed and rhythm.     Gait: Normal, steady casual gait. Able to walk on toes, heels with mild difficulty. Not able to walk or  tandem.         Data: Pertinent prior to visit   Imaging:  MRI brain 11/2014      Procedures:  None    Laboratory:  CMP unremarkable 6/2021         Assessment and Plan:   Assessment:  Erica Ramos is a 66 year old female who presents today for evaluation of imbalance. Symptoms have been going on for approximately the last year. She also has had tingling in the feet for the last several years. Examination shows mildly ataxic gait and mild distal lower extremity sensory changes. 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 History of aneurysm rupture may also play a role in sense of imbalance as well. Given lack of atypical features, EMG  for work-up of polyneuropathy is likely low yield. We discussed checking common causes of polyneuropathy. If symptoms worsen before next follow-up, we could consider additional diagnostic testing. Referral placed for PT for gait and balance training. Prescription for gabapentin also sent for neuropathic pain.      Plan:  - A1c, B12, ELP, light chains, immunofixation, CBC  - PT referral  - Gabapentin 100 mg BID; increases in dosage could be considered if needed    Follow up in Neurology clinic in 3 months or earlier as needed should new concerns arise.    Eddie Roberto MD   of Neurology  [facility]      The total time of this encounter today amounted to 47 minutes. This time included time spent with the patient, prep work, ordering tests, and performing post visit documentation.        Again, thank you for allowing me to participate in the care of your patient.        Sincerely,        Eddie Roberto MD

## 2021-08-18 NOTE — PROGRESS NOTES
[facility] Neurology Consultation    Erica Ramos MRN# [id number]   Age: 66 year old YOB: 1955     Requesting physician: Leonela Madrid     Reason for Consultation: imbalance      History of Presenting Symptoms:   Erica Ramos is a 66 year old female who presents today for evaluation of imbalance.     Symptoms have been going on for about the last year. She has had several falls which resulted in a broken shoulder and foot. She denies denies, but reports more of a sense of imbalance with walking.    She developed tingling in the feet, sensation of falling asleep. These has been going on for few years. Symptoms are more bothersome at night. Her hands intermittently fall asleep with certain activities like holding a book. She uses a numbing cream which can help.     She denies any incontinence of bowel or bladder. She doesn't know of any low back pain down the legs.     She had a brain aneurysm which ruptured in 2008. She had some mild balance problems after, but not to the degree she has now. She also had hearing problems.       Past Medical History:     Patient Active Problem List   Diagnosis     Obesity     Advanced directives, counseling/discussion     Hyperlipidemia LDL goal <130     Hypertension, goal below 140/90     Anxiety and depression     Rheumatoid arthritis with positive rheumatoid factor, involving unspecified site (H)     Cerebral aneurysm     Osteopenia of both hips     Acute pain of right shoulder     Past Medical History:   Diagnosis Date     Brain aneurysm      Depression, major      Hyperlipidaemia         Past Surgical History:     Past Surgical History:   Procedure Laterality Date     COLONOSCOPY WITH CO2 INSUFFLATION N/A 12/19/2017    Procedure: COLONOSCOPY WITH CO2 INSUFFLATION;  COLON SCREEN/ KNAEBLE;  Surgeon: Romeo Almonte MD;  Location: [facility] OR     D & C      polyps     SURGICAL HISTORY OF - 8-08    aneurysm repair        Social  History:     Social History     Tobacco Use     Smoking status: Former Smoker     Quit date: 2006     Years since quittin.7     Smokeless tobacco: Never Used   Substance Use Topics     Alcohol use: No     Drug use: No        Family History:     Family History   Problem Relation Age of Onset     Cancer Mother         non hodgkins lymphoma     C.A.D. Father      Hypertension Father      Cerebrovascular Disease Father      Hypertension Brother      Cancer Daughter         lymphoma        Medications:     Current Outpatient Medications   Medication Sig     adalimumab (HUMIRA) 40 MG/0.8ML prefilled syringe kit Inject 40 mg Subcutaneous     alendronate (FOSAMAX) 70 MG tablet Take 70 mg by mouth     ASPIRIN ADULT LOW STRENGTH PO      CALCIUM-VITAMIN D PO      FLUoxetine (PROZAC) 20 MG capsule Take 3 capsules (60 mg) by mouth daily     folic acid (FOLVITE) 1 MG tablet Take 2 mg by mouth     leucovorin (WELLCOVORIN) 5 MG tablet      losartan (COZAAR) 50 MG tablet Take 1 tablet (50 mg) by mouth daily     methotrexate 2.5 MG tablet CHEMO Take 25 mg by mouth     pravastatin (PRAVACHOL) 40 MG tablet Take 1 tablet (40 mg) by mouth daily     No current facility-administered medications for this visit.        Allergies:   No Known Allergies     Review of Systems:   As above     Physical Exam:   Vitals: /84   Pulse 91   Resp 20    General: Seated comfortably in no acute distress.  HEENT: Optic discs sharp and vasculature normal on funduscopic exam.   Lungs: breathing comfortably  Extremities: no edema  Skin: No rashes  Neurologic:     Mental Status: Fully alert, attentive. Normal memory and fund of knowledge. Language normal, speech clear and fluent, no paraphasic errors.     Cranial Nerves: Visual fields intact. PERRL. EOMI with normal smooth pursuit. Facial sensation intact/symmetric. Facial movements symmetric. Hearing not formally tested but intact to conversation. Palate elevation symmetric, uvula midline. No  dysarthria. Shoulder shrug strong bilaterally. Tongue protrusion midline.     Motor: No tremors or other abnormal movements observed. Muscle tone normal throughout. Normal/symmetric rapid finger tapping. Strength 5/5 throughout upper and lower extremities.     Deep Tendon Reflexes: 2+/symmetric throughout upper and lower extremities. No clonus. Toes downgoing bilaterally.     Sensory: Sensitivity to light touch in the toes, otherwise normal. Decreased sensation to temperature and pinprick below the mid shins bilaterally. Vibration is 3-4 seconds in bilateral great toes, 5-6 seconds at ankles, normal in hands. Proprioception is intact throughout. Negative Romberg.      Coordination: Finger-nose-finger and heel-shin intact without dysmetria. Rapid alternating movements intact/symmetric with normal speed and rhythm.     Gait: Normal, steady casual gait. Able to walk on toes, heels with mild difficulty. Not able to walk or  tandem.         Data: Pertinent prior to visit   Imaging:  MRI brain 11/2014      Procedures:  None    Laboratory:  CMP unremarkable 6/2021         Assessment and Plan:   Assessment:  Erica Ramos is a 66 year old female who presents today for evaluation of imbalance. Symptoms have been going on for approximately the last year. She also has had tingling in the feet for the last several years. Examination shows mildly ataxic gait and mild distal lower extremity sensory changes. 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 History of aneurysm rupture may also play a role in sense of imbalance as well. Given lack of atypical features, EMG for work-up of polyneuropathy is likely low yield. We discussed checking common causes of polyneuropathy. If symptoms worsen before next follow-up, we could consider additional diagnostic testing. Referral placed for PT for gait and balance training. Prescription for gabapentin  also sent for neuropathic pain.      Plan:  - A1c, B12, ELP, light chains, immunofixation, CBC  - PT referral  - Gabapentin 100 mg BID; increases in dosage could be considered if needed    Follow up in Neurology clinic in 3 months or earlier as needed should new concerns arise.    Eddie Roberto MD   of Neurology  [facility]      The total time of this encounter today amounted to 47 minutes. This time included time spent with the patient, prep work, ordering tests, and performing post visit documentation.

## 2021-08-19 LAB
ALBUMIN SERPL ELPH-MCNC: 4.4 G/DL (ref 3.7–5.1)
ALPHA1 GLOB SERPL ELPH-MCNC: 0.4 G/DL (ref 0.2–0.4)
ALPHA2 GLOB SERPL ELPH-MCNC: 0.8 G/DL (ref 0.5–0.9)
B-GLOBULIN SERPL ELPH-MCNC: 1.1 G/DL (ref 0.6–1)
GAMMA GLOB SERPL ELPH-MCNC: 1.1 G/DL (ref 0.7–1.6)
KAPPA LC FREE SER-MCNC: 1.72 MG/DL (ref 0.33–1.94)
KAPPA LC FREE/LAMBDA FREE SER NEPH: 1.06 {RATIO} (ref 0.26–1.65)
LAMBDA LC FREE SERPL-MCNC: 1.63 MG/DL (ref 0.57–2.63)
M PROTEIN SERPL ELPH-MCNC: 0 G/DL
PROT PATTERN SERPL ELPH-IMP: ABNORMAL
PROT PATTERN SERPL IFE-IMP: NORMAL

## 2021-09-20 ENCOUNTER — TELEPHONE (OUTPATIENT)
Dept: FAMILY MEDICINE | Facility: CLINIC | Age: 66
End: 2021-09-20

## 2021-09-20 DIAGNOSIS — F41.9 ANXIETY AND DEPRESSION: ICD-10-CM

## 2021-09-20 DIAGNOSIS — F32.A ANXIETY AND DEPRESSION: ICD-10-CM

## 2021-09-20 NOTE — TELEPHONE ENCOUNTER
Patient states she called [facility] in [facility] to request refill of FLUoxetine (PROZAC) 20 MG capsule. Per pharmacy med was discontinued by provider. Patient is unaware that she is to stop med. Please advise.

## 2021-09-22 ENCOUNTER — HOSPITAL ENCOUNTER (OUTPATIENT)
Dept: PHYSICAL THERAPY | Facility: CLINIC | Age: 66
Setting detail: THERAPIES SERIES
End: 2021-09-22
Attending: INTERNAL MEDICINE
Payer: MEDICARE

## 2021-09-22 DIAGNOSIS — R26.89 IMBALANCE: ICD-10-CM

## 2021-09-22 DIAGNOSIS — G62.9 NEUROPATHY: ICD-10-CM

## 2021-09-22 PROCEDURE — 97112 NEUROMUSCULAR REEDUCATION: CPT | Mod: GP | Performed by: PHYSICAL THERAPIST

## 2021-09-22 PROCEDURE — 97161 PT EVAL LOW COMPLEX 20 MIN: CPT | Mod: GP | Performed by: PHYSICAL THERAPIST

## 2021-09-22 NOTE — PROGRESS NOTES
09/22/21 0800   Quick Adds   Quick Adds Certification   Type of Visit Initial OP PT Evaluation   General Information   Start of Care Date 09/22/21   Referring Physician Eddie Roberto   Orders Evaluate and Treat as Indicated   Order Date 08/18/21   Medical Diagnosis Neuropathy, Imbalance   Onset of illness/injury or Date of Surgery 09/22/20   Special Instructions balance problems in the setting of neuropathy   Surgical/Medical history reviewed Yes   Pertinent history of current problem (include personal factors and/or comorbidities that impact the POC) Erica Ramos is a 66 year old female who presents today for evaluation of imbalance.  History of RA in the past 5-6 years, depression and anxiety. Recent falls with shoulder and foot injuries. Injury from a trip and fall on 4/26/2021, landing on right upper extremity, face. Was seen at [facility], with xrays showing proximal humerus fracture. Also some facial lacerations that were treated.    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 Stairs to the basement but she does not do consistently with bilateral rails   Current Assistive Devices Four Wheeled Walker;Standard Cane;Walking Poles   Patient/Family Goals Statement Decrease risk of falls   Fall Risk Screen   Fall screen completed by PT   Have you fallen 2 or more times in the past year? Yes   Have you fallen and had an injury in the past year? Yes   Timed Up and Go score (seconds) 8.03   Is patient a fall risk? Yes   Fall screen comments History of falls   Abuse Screen (yes response referral indicated)   Feels Unsafe at Home or Work/School no   Feels Threatened by Someone no   Does Anyone Try to Keep You From Having Contact with Others or Doing Things Outside Your Home? no   Physical Signs  of Abuse Present no   Pain   Patient currently in pain Denies   Pain comments Can have pain in bilateral knees from RA   Vitals Signs   Heart Rate 79   SpO2 96   Blood Pressure 123/86   Cognitive Status Examination   Orientation orientation to person, place and time   Follows Commands and Answers Questions 100% of the time;able to follow multistep instructions   Personal Safety and Judgment intact   Posture   Posture Normal   Range of Motion (ROM)   ROM Comment WFLs   Strength   Strength Comments 5/5 MMT testing screen   Transfer Skills   Transfer Comments Use of UEs for support   Gait   Gait Comments Does not swing left arm   Gait Special Tests   Gait Special Tests 25 FOOT TIMED WALK;FUNCTIONAL GAIT ASSESSMENT   Gait Special Tests 25 Foot Timed Walk   Seconds 7.9   Steps 14 Steps   Gait Special Tests Functional Gait Assessment Score out of 30   Score out of 30 24   Balance   Balance other (describe)   Balance Comments Difficulty standing on dynamic surfaces   Balance Special Tests   Balance Special Tests Single leg stance right;Single leg stance left;Modified CTSIB Conditions   Balance Special Tests Single Leg Stance Right,   Right, seconds 5.7 Seconds   Balance Special Tests Single Leg Stance Left   Left, seconds 2.6 Seconds   Balance Special Tests Modified CTSIB Conditions   Condition 1, seconds 30 Seconds   Condition 2, seconds 30 Seconds   Condition 4, seconds 30 Seconds   Condition 5, seconds 30 Seconds   Modified CTSIB Comments Increased sway on foam   Sensory Examination   Sensory Perception other (describe)   Sensory Perception Comments Bilateral pinching in toes   Planned Therapy Interventions   Planned Therapy Interventions balance training;neuromuscular re-education;ROM;strengthening;stretching   Clinical Impression   Criteria for Skilled Therapeutic Interventions Met yes, treatment indicated   PT Diagnosis Impaired balance, decreased sensation   Influenced by the following impairments Impaired balance,  "impaired gait, decreased sensation through bilateral feet   Functional limitations due to impairments Increased risk for falls, impaired balance with daily tasks.   Clinical Presentation Evolving/Changing   Clinical Presentation Rationale Progression of neuropathy, clinical judgement   Clinical Decision Making (Complexity) Low complexity   Therapy Frequency other (see comments)   Predicted Duration of Therapy Intervention (days/wks) Biweekly for 90 days   Risk & Benefits of therapy have been explained Yes   Patient, Family & other staff in agreement with plan of care Yes   Clinical Impression Comments Nannette presents with neuropathy in feet compromising balance with a hostory of falls with injury.  She reports she ahs no reaction and just \"falls\" out of nowhere.  She will benefit from skilled PT to address impairments and educate in fall reduction strategies.   GOALS   PT Eval Goals 1;2;3   Goal 1   Goal Identifier FGA   Goal Description Nannette will improve her score on the Functional Gait Assessment with inital score of 24/30 by 2 points or greater to demonstrate being at decreased risk for falls.   Target Date 12/20/21   Goal 2   Goal Identifier Reach   Goal Description Nannette will demonstrate safe sequence bending forward to  object from floor and reaching above head in cupboards in 3/3 trials each with no loss of balance.   Target Date 12/20/21   Goal 3   Goal Identifier falls   Goal Description Nannette will reports no falls in a one month time frame and implement 3 fall reduction strategies to implement at home.   Target Date 12/20/21   Total Evaluation Time   PT Eval, Low Complexity Minutes (69506) 30   Therapy Certification   Certification date from 09/22/21   Certification date to 12/20/21   Medical Diagnosis Neuropathy, Imbalance   Certification I certify the need for these services furnished under this plan of treatment and while under my care.  (Physician co-signature of this document indicates review and " certification of the therapy plan).

## 2021-09-22 NOTE — PROGRESS NOTES
Rehabilitation Services        OUTPATIENT PHYSICAL THERAPY FUNCTIONAL EVALUATION  PLAN OF TREATMENT FOR OUTPATIENT REHABILITATION  (COMPLETE FOR INITIAL CLAIMS ONLY)  Patient's Last Name, First Name, M.I.  YOB: 1955  RichardNannetteErica  TERESE     Provider's Name   Prisca Washington PT   Medical Record No.  [id number]     Start of Care Date:  09/22/21   Onset Date:  09/22/20   Type:     _X__PT   ____OT  ____SLP Medical Diagnosis:  Neuropathy, Imbalance     PT Diagnosis:  Impaired balance, decreased sensation Visits from SOC:  1                              __________________________________________________________________________________  Plan of Treatment/Functional Goals:  balance training, neuromuscular re-education, ROM, strengthening, stretching           GOALS  SYED Brunson will improve her score on the Functional Gait Assessment with inital score of 24/30 by 2 points or greater to demonstrate being at decreased risk for falls.  12/20/21    Reach  Nannette will demonstrate safe sequence bending forward to  object from floor and reaching above head in cupboards in 3/3 trials each with no loss of balance.  12/20/21    falls  Nannette will reports no falls in a one month time frame and implement 3 fall reduction strategies to implement at home.  12/20/21                                                           Therapy Frequency:  other (see comments)   Predicted Duration of Therapy Intervention:  Biweekly for 90 days    Prisca Washington PT                                    I CERTIFY THE NEED FOR THESE SERVICES FURNISHED UNDER        THIS PLAN OF TREATMENT AND WHILE UNDER MY CARE     (Physician co-signature of this document indicates review and certification of the therapy plan).                Certification Date From:  09/22/21   Certification Date To:  12/20/21    Referring Provider:  Eddie Bonilla  Assessment  See Epic Evaluation- Start of Care Date: 09/22/21

## 2021-09-26 ENCOUNTER — HEALTH MAINTENANCE LETTER (OUTPATIENT)
Age: 66
End: 2021-09-26

## 2021-09-27 PROBLEM — M25.511 ACUTE PAIN OF RIGHT SHOULDER: Status: RESOLVED | Noted: 2021-05-27 | Resolved: 2021-09-27

## 2021-09-27 NOTE — PROGRESS NOTES
Subjective:  HPI  Physical Exam                    Objective:  System    Physical Exam    General     ROS    Assessment/Plan:    DISCHARGE REPORT    Progress reporting period is from 05/27/2021 to 09/27/2021.   Patient has not returned to therapy so current subjective and objective findings are unknown.  The subjective and objective information are from the last visit (07/26/2021) with this patient.    SUBJECTIVE      Current Pain level: 0/10   Initial Pain level: 2/10   Changes in function: Yes, see goal flow sheet for change in function     OBJECTIVE  Objective: PROM: 135 with 1# wt supine, 50deg ER shld in 90 abd. 150 deg with supine abd.       ASSESSMENT/PLAN  Updated problem list and treatment plan: Diagnosis 1:  Humeral fracture -- home program  STG/LTGs have been met or progress has been made towards goals:  N/A  Assessment of Progress: The patient has not returned to therapy. Current status is unknown.  Self Management Plans:  Patient has been instructed in a home treatment program.  Erica continues to require the following intervention to meet STG and LTG's: PT intervention is no longer required to meet STG/LTG.    Recommendations:  Pt last seen in PT 07/26/2021.  She did not schedule f/u beyond that and is now in PT for another diagnosis.  Discharge this episode to HEP.

## 2021-10-26 ENCOUNTER — HOSPITAL ENCOUNTER (OUTPATIENT)
Dept: PHYSICAL THERAPY | Facility: CLINIC | Age: 66
Setting detail: THERAPIES SERIES
End: 2021-10-26
Attending: INTERNAL MEDICINE
Payer: MEDICARE

## 2021-10-26 PROCEDURE — 97112 NEUROMUSCULAR REEDUCATION: CPT | Mod: GP | Performed by: PHYSICAL THERAPIST

## 2021-10-26 PROCEDURE — 97110 THERAPEUTIC EXERCISES: CPT | Mod: GP | Performed by: PHYSICAL THERAPIST

## 2021-11-09 ENCOUNTER — HOSPITAL ENCOUNTER (OUTPATIENT)
Dept: PHYSICAL THERAPY | Facility: CLINIC | Age: 66
Setting detail: THERAPIES SERIES
End: 2021-11-09
Attending: INTERNAL MEDICINE
Payer: MEDICARE

## 2021-11-09 PROCEDURE — 97112 NEUROMUSCULAR REEDUCATION: CPT | Mod: GP | Performed by: PHYSICAL THERAPIST

## 2021-11-09 NOTE — PROGRESS NOTES
Outpatient Physical Therapy Discharge Note     Patient: Erica Ramos  : 1955    Beginning/End Dates of Reporting Period:  2021 to 2021 Seen for 3 visits    Referring Provider: Eddie Roberto MD    Therapy Diagnosis: Impaired balance, decreased sensation     Client Self Report: She continues to feel improvement and reports no falls with ability now to regain balance where she feels she was not initially able to do this.    Objective Measurements:  Objective Measure: mCTSIB  Details: can maintain 30 seconds in all conditions.  Objective Measure: sit<>stand  Details: 18       Goals:  Goal Identifier FGA   Goal Description Nannette will improve her score on the Functional Gait Assessment with inital score of 24/30 by 2 points or greater to demonstrate being at decreased risk for falls.   Target Date 21   Date Met  21   Progress (detail required for progress note):  today     Goal Identifier Reach   Goal Description Nannette will demonstrate safe sequence bending forward to  object from floor and reaching above head in cupboards in 3/3 trials each with no loss of balance.   Target Date 21   Date Met  21   Progress (detail required for progress note): She reports increased ease with bendign and reaching.     Goal Identifier falls   Goal Description Nannette will reports no falls in a one month time frame and implement 3 fall reduction strategies to implement at home.   Target Date 21   Date Met  21   Progress (detail required for progress note): She has not fallen and had two instances of when she lost her balance and was able to recover easily.       Plan:  Discharge from therapy.    Discharge:    Reason for Discharge: Patient has met all goals.    Equipment Issued: None    Discharge Plan: Patient to continue home program.

## 2021-11-16 ENCOUNTER — OFFICE VISIT (OUTPATIENT)
Dept: NEUROLOGY | Facility: CLINIC | Age: 66
End: 2021-11-16
Payer: MEDICARE

## 2021-11-16 VITALS
HEIGHT: 67 IN | HEART RATE: 73 BPM | DIASTOLIC BLOOD PRESSURE: 95 MMHG | SYSTOLIC BLOOD PRESSURE: 156 MMHG | WEIGHT: 189 LBS | BODY MASS INDEX: 29.66 KG/M2

## 2021-11-16 DIAGNOSIS — G62.9 NEUROPATHY: Primary | ICD-10-CM

## 2021-11-16 DIAGNOSIS — R26.89 IMBALANCE: ICD-10-CM

## 2021-11-16 PROCEDURE — 99214 OFFICE O/P EST MOD 30 MIN: CPT | Performed by: INTERNAL MEDICINE

## 2021-11-16 ASSESSMENT — PAIN SCALES - GENERAL: PAINLEVEL: NO PAIN (0)

## 2021-11-16 ASSESSMENT — MIFFLIN-ST. JEOR: SCORE: 1434.93

## 2021-11-16 NOTE — NURSING NOTE
"Erica Ramos's goals for this visit include: return  She requests these members of her care team be copied on today's visit information:     PCP: Leonela Ibanez    Referring Provider:  No referring provider defined for this encounter.    BP (!) 156/95   Pulse 73   Ht 1.702 m (5' 7\")   Wt 85.7 kg (189 lb)   BMI 29.60 kg/m      Do you need any medication refills at today's visit? n  "

## 2021-11-16 NOTE — LETTER
2021         RE: Erica Ramos  [address]  [place] [address]        Dear Colleague,    Thank you for referring your patient, Erica Ramos, to the [facility] [place]. Please see a copy of my visit note below.    [facility] Neurology Follow Up Visit    Erica Ramos MRN# [id number]   Age: 66 year old YOB: 1955     Brief history of symptoms: The patient was initially seen in neurologic consultation on 2021 for evaluation of imbalance. Please see the comprehensive neurologic consultation note from that date in the Epic records for details.     Interval history:   Patient reports significant improvement in balance with physical therapy. She denies any falls.     Gabapentin is controlling the tingling sensations in the feet. She is able to sleep better.       Past Medical History:     Patient Active Problem List   Diagnosis     Obesity     Advanced directives, counseling/discussion     Hyperlipidemia LDL goal <130     Hypertension, goal below 140/90     Anxiety and depression     Rheumatoid arthritis with positive rheumatoid factor, involving unspecified site (H)     Cerebral aneurysm     Osteopenia of both hips     Past Medical History:   Diagnosis Date     Brain aneurysm      Depression, major      Hyperlipidaemia         Past Surgical History:     Past Surgical History:   Procedure Laterality Date     COLONOSCOPY WITH CO2 INSUFFLATION N/A 2017    Procedure: COLONOSCOPY WITH CO2 INSUFFLATION;  COLON SCREEN/ KNAEBLE;  Surgeon: Romeo Almonte MD;  Location: [facility] OR     D & C      polyps     SURGICAL HISTORY OF     aneurysm repair        Social History:     Social History     Tobacco Use     Smoking status: Former Smoker     Quit date: 2006     Years since quittin.9     Smokeless tobacco: Never Used   Substance Use Topics     Alcohol use: No     Drug use: No        Family History:     Family History   Problem  "Relation Age of Onset     Cancer Mother         non hodgkins lymphoma     C.A.D. Father      Hypertension Father      Cerebrovascular Disease Father      Hypertension Brother      Cancer Daughter         lymphoma        Medications:     Current Outpatient Medications   Medication Sig     adalimumab (HUMIRA) 40 MG/0.8ML prefilled syringe kit Inject 40 mg Subcutaneous     alendronate (FOSAMAX) 70 MG tablet Take 70 mg by mouth      ASPIRIN ADULT LOW STRENGTH PO      CALCIUM-VITAMIN D PO      FLUoxetine (PROZAC) 20 MG capsule Take 3 capsules (60 mg) by mouth daily     folic acid (FOLVITE) 1 MG tablet Take 2 mg by mouth     gabapentin (NEURONTIN) 100 MG capsule Take 1 capsule (100 mg) by mouth 2 times daily     leucovorin (WELLCOVORIN) 5 MG tablet      losartan (COZAAR) 50 MG tablet Take 1 tablet (50 mg) by mouth daily     methotrexate 2.5 MG tablet CHEMO Take 25 mg by mouth     pravastatin (PRAVACHOL) 40 MG tablet Take 1 tablet (40 mg) by mouth daily     No current facility-administered medications for this visit.        Allergies:   No Known Allergies     Review of Systems:   As above     Physical Exam:   Vitals: BP (!) 156/95   Pulse 73   Ht 1.702 m (5' 7\")   Wt 85.7 kg (189 lb)   BMI 29.60 kg/m     General: Seated comfortably in no acute distress.  Lungs: breathing comfortably  Extremities: no edema  Skin: No rashes  Neurologic:     Mental Status: Fully alert, attentive. Normal memory and fund of knowledge. Language normal, speech clear and fluent, no paraphasic errors.      Cranial Nerves: No dysarthria.      Motor: No tremors or other abnormal movements observed. Muscle tone normal throughout.Normal/symmetric rapid finger tapping. Strength 5/5 throughout upper and lower extremities.     Deep Tendon Reflexes: 2+/symmetric throughout upper and lower extremities with exception of 1+ ankle jerks. Toes downgoing bilaterally.     Sensory: Intact/symmetric to light touch, temperature, and proprioception throughout " upper and lower extremities. Vibration is 4 seconds in bilateral great toes, normal in the hands. Negative Romberg.      Coordination: Finger-nose-finger and heel-shin intact without dysmetria. Rapid alternating movements intact/symmetric with normal speed and rhythm.     Gait: Normal, steady casual gait. Able to walk on toes, heels with minimal difficulty. Not able to tandem.     Data reviewed on previous visits           Data: Pertinent prior to visit   Imaging:  MRI brain 11/2014       Laboratory:  CMP unremarkable 6/2021    Pertinent Investigations since last visit:   Labs 8/2021 - Normal/negative A1c, B12, ELP, light chains, immunofixation, CBC         Assessment and Plan:   Erica Ramos is a 66 year old female who presents today for follow-up of imbalance. Imbalance is thought to be multifactorial suspect related to distal symmetric polyneuropathy, history of aneurysm rupture, and arthritis. Given lack of atypical features, EMG for work-up of polyneuropathy is likely low yield. Common causes of polyneuropathy were checked as above and unremarkable. There is an increased incidence of polyneuropathy in patient's with rheumatoid arthritis, so this is one possibility. Sensory examination is stable today, and balance is slightly improved with physical therapy. Patient can follow-up as needed in the future if there is worsening of symptoms. Primary care provider can provide future refills on gabapentin 100 mg BID. Increases in dosage could be considered in the future if needed.    Follow up in Neurology clinic as needed should new concerns arise.    Eddie Roberto MD   of Neurology  [facility]        Again, thank you for allowing me to participate in the care of your patient.        Sincerely,        Eddie Roberto MD

## 2021-11-16 NOTE — PROGRESS NOTES
[facility] Neurology Follow Up Visit    Erica Ramos MRN# [id number]   Age: 66 year old YOB: 1955     Brief history of symptoms: The patient was initially seen in neurologic consultation on 2021 for evaluation of imbalance. Please see the comprehensive neurologic consultation note from that date in the Epic records for details.     Interval history:   Patient reports significant improvement in balance with physical therapy. She denies any falls.     Gabapentin is controlling the tingling sensations in the feet. She is able to sleep better.       Past Medical History:     Patient Active Problem List   Diagnosis     Obesity     Advanced directives, counseling/discussion     Hyperlipidemia LDL goal <130     Hypertension, goal below 140/90     Anxiety and depression     Rheumatoid arthritis with positive rheumatoid factor, involving unspecified site (H)     Cerebral aneurysm     Osteopenia of both hips     Past Medical History:   Diagnosis Date     Brain aneurysm      Depression, major      Hyperlipidaemia         Past Surgical History:     Past Surgical History:   Procedure Laterality Date     COLONOSCOPY WITH CO2 INSUFFLATION N/A 2017    Procedure: COLONOSCOPY WITH CO2 INSUFFLATION;  COLON SCREEN/ KNAEBLE;  Surgeon: Romeo Almonte MD;  Location: [facility] OR     D & C      polyps     SURGICAL HISTORY OF     aneurysm repair        Social History:     Social History     Tobacco Use     Smoking status: Former Smoker     Quit date: 2006     Years since quittin.9     Smokeless tobacco: Never Used   Substance Use Topics     Alcohol use: No     Drug use: No        Family History:     Family History   Problem Relation Age of Onset     Cancer Mother         non hodgkins lymphoma     C.A.D. Father      Hypertension Father      Cerebrovascular Disease Father      Hypertension Brother      Cancer Daughter         lymphoma        Medications:     Current Outpatient Medications  "  Medication Sig     adalimumab (HUMIRA) 40 MG/0.8ML prefilled syringe kit Inject 40 mg Subcutaneous     alendronate (FOSAMAX) 70 MG tablet Take 70 mg by mouth      ASPIRIN ADULT LOW STRENGTH PO      CALCIUM-VITAMIN D PO      FLUoxetine (PROZAC) 20 MG capsule Take 3 capsules (60 mg) by mouth daily     folic acid (FOLVITE) 1 MG tablet Take 2 mg by mouth     gabapentin (NEURONTIN) 100 MG capsule Take 1 capsule (100 mg) by mouth 2 times daily     leucovorin (WELLCOVORIN) 5 MG tablet      losartan (COZAAR) 50 MG tablet Take 1 tablet (50 mg) by mouth daily     methotrexate 2.5 MG tablet CHEMO Take 25 mg by mouth     pravastatin (PRAVACHOL) 40 MG tablet Take 1 tablet (40 mg) by mouth daily     No current facility-administered medications for this visit.        Allergies:   No Known Allergies     Review of Systems:   As above     Physical Exam:   Vitals: BP (!) 156/95   Pulse 73   Ht 1.702 m (5' 7\")   Wt 85.7 kg (189 lb)   BMI 29.60 kg/m     General: Seated comfortably in no acute distress.  Lungs: breathing comfortably  Extremities: no edema  Skin: No rashes  Neurologic:     Mental Status: Fully alert, attentive. Normal memory and fund of knowledge. Language normal, speech clear and fluent, no paraphasic errors.      Cranial Nerves: No dysarthria.      Motor: No tremors or other abnormal movements observed. Muscle tone normal throughout.Normal/symmetric rapid finger tapping. Strength 5/5 throughout upper and lower extremities.     Deep Tendon Reflexes: 2+/symmetric throughout upper and lower extremities with exception of 1+ ankle jerks. Toes downgoing bilaterally.     Sensory: Intact/symmetric to light touch, temperature, and proprioception throughout upper and lower extremities. Vibration is 4 seconds in bilateral great toes, normal in the hands. Negative Romberg.      Coordination: Finger-nose-finger and heel-shin intact without dysmetria. Rapid alternating movements intact/symmetric with normal speed and rhythm.    "  Gait: Normal, steady casual gait. Able to walk on toes, heels with minimal difficulty. Not able to tandem.     Data reviewed on previous visits           Data: Pertinent prior to visit   Imaging:  MRI brain 11/2014       Laboratory:  CMP unremarkable 6/2021    Pertinent Investigations since last visit:   Labs 8/2021 - Normal/negative A1c, B12, ELP, light chains, immunofixation, CBC         Assessment and Plan:   Erica Ramos is a 66 year old female who presents today for follow-up of imbalance. Imbalance is thought to be multifactorial suspect related to distal symmetric polyneuropathy, history of aneurysm rupture, and arthritis. Given lack of atypical features, EMG for work-up of polyneuropathy is likely low yield. Common causes of polyneuropathy were checked as above and unremarkable. There is an increased incidence of polyneuropathy in patient's with rheumatoid arthritis, so this is one possibility. Sensory examination is stable today, and balance is slightly improved with physical therapy. Patient can follow-up as needed in the future if there is worsening of symptoms. Primary care provider can provide future refills on gabapentin 100 mg BID. Increases in dosage could be considered in the future if needed.    Follow up in Neurology clinic as needed should new concerns arise.    Eddie Roberot MD   of Neurology  [facility]

## 2021-11-16 NOTE — PATIENT INSTRUCTIONS
Ask Leonela to refill future prescriptions for gabapentin    Give us a call to schedule a follow-up appointment if you notice worsening of symptoms

## 2022-06-09 DIAGNOSIS — E78.5 HYPERLIPIDEMIA LDL GOAL <130: ICD-10-CM

## 2022-06-09 RX ORDER — PRAVASTATIN SODIUM 40 MG
40 TABLET ORAL DAILY
Qty: 90 TABLET | Refills: 0 | Status: SHIPPED | OUTPATIENT
Start: 2022-06-09 | End: 2022-09-07

## 2022-06-15 ENCOUNTER — TRANSFERRED RECORDS (OUTPATIENT)
Dept: MULTI SPECIALTY CLINIC | Facility: CLINIC | Age: 67
End: 2022-06-15

## 2022-08-27 ENCOUNTER — HEALTH MAINTENANCE LETTER (OUTPATIENT)
Age: 67
End: 2022-08-27

## 2022-09-15 NOTE — TELEPHONE ENCOUNTER
Reason for Call:  Other call back    Detailed comments: Pt stopped at the . Previous patient of Radha Brewster. Pt is requesting domes and the piece that goes inside the domes, she stated it comes in a packet. Please call pt to advise when ready for pickup.    Phone Number Patient can be reached at: Cell number on file:    Telephone Information:   Mobile [phone]       Best Time: any    Can we leave a detailed message on this number? YES    Call taken on 2/7/2018 at 10:41 AM by Chani Rodriges     Medical Necessity Statement: Based on my medical judgement, Mohs surgery is the most appropriate treatment for this cancer compared to other treatments.

## 2022-10-26 ENCOUNTER — ALLIED HEALTH/NURSE VISIT (OUTPATIENT)
Dept: AUDIOLOGY | Facility: CLINIC | Age: 67
End: 2022-10-26

## 2022-10-26 DIAGNOSIS — H90.3 SENSORINEURAL HEARING LOSS, BILATERAL: Primary | ICD-10-CM

## 2022-10-26 PROCEDURE — 92593 PR HEARING AID CHECK, BINAURAL: CPT | Performed by: AUDIOLOGIST

## 2022-10-27 NOTE — PROGRESS NOTES
HEARING AID RECHECK    Patient Name:  Erica Ramos    Patient Age:   67 year old    :  1955    Background:   Patient dropped off her [employer] hearing aids with recent audiogram and asked for aids to be updated to new thresholds.    SIDE: Both    : Unitron    TYPE: [place] E    S/N: [id number]    WARRANTY:     Procedures:   I entered audiogram into [facility] and updated gain to new audiogram.  Listening check completed to verify gain and sound quality.  Note that right earmold is broken.  I called patient and told her they were ready for pickup.    Plan:   Return as needed for service.      Kameron Benton MA, CCC-A  [place] Licensed Audiologist #[id number]  10/27/2022

## 2022-11-02 ENCOUNTER — OFFICE VISIT (OUTPATIENT)
Dept: FAMILY MEDICINE | Facility: CLINIC | Age: 67
End: 2022-11-02
Payer: MEDICARE

## 2022-11-02 VITALS
BODY MASS INDEX: 30.89 KG/M2 | TEMPERATURE: 97.5 F | OXYGEN SATURATION: 96 % | SYSTOLIC BLOOD PRESSURE: 116 MMHG | HEIGHT: 66 IN | HEART RATE: 80 BPM | WEIGHT: 192.2 LBS | DIASTOLIC BLOOD PRESSURE: 70 MMHG | RESPIRATION RATE: 20 BRPM

## 2022-11-02 DIAGNOSIS — Z00.00 ENCOUNTER FOR MEDICARE ANNUAL WELLNESS EXAM: Primary | ICD-10-CM

## 2022-11-02 DIAGNOSIS — I10 HYPERTENSION, GOAL BELOW 140/90: ICD-10-CM

## 2022-11-02 DIAGNOSIS — E78.5 HYPERLIPIDEMIA LDL GOAL <130: ICD-10-CM

## 2022-11-02 DIAGNOSIS — F32.A ANXIETY AND DEPRESSION: ICD-10-CM

## 2022-11-02 DIAGNOSIS — Z23 HIGH PRIORITY FOR 2019-NCOV VACCINE: ICD-10-CM

## 2022-11-02 DIAGNOSIS — M05.9 RHEUMATOID ARTHRITIS WITH POSITIVE RHEUMATOID FACTOR, INVOLVING UNSPECIFIED SITE (H): ICD-10-CM

## 2022-11-02 DIAGNOSIS — F41.9 ANXIETY AND DEPRESSION: ICD-10-CM

## 2022-11-02 PROCEDURE — 90662 IIV NO PRSV INCREASED AG IM: CPT | Performed by: PHYSICIAN ASSISTANT

## 2022-11-02 PROCEDURE — 80053 COMPREHEN METABOLIC PANEL: CPT | Performed by: PHYSICIAN ASSISTANT

## 2022-11-02 PROCEDURE — 0134A COVID-19,PF,MODERNA BIVALENT: CPT | Performed by: PHYSICIAN ASSISTANT

## 2022-11-02 PROCEDURE — 82043 UR ALBUMIN QUANTITATIVE: CPT | Performed by: PHYSICIAN ASSISTANT

## 2022-11-02 PROCEDURE — 36415 COLL VENOUS BLD VENIPUNCTURE: CPT | Performed by: PHYSICIAN ASSISTANT

## 2022-11-02 PROCEDURE — G0008 ADMIN INFLUENZA VIRUS VAC: HCPCS | Performed by: PHYSICIAN ASSISTANT

## 2022-11-02 PROCEDURE — 90677 PCV20 VACCINE IM: CPT | Performed by: PHYSICIAN ASSISTANT

## 2022-11-02 PROCEDURE — 99214 OFFICE O/P EST MOD 30 MIN: CPT | Mod: 25 | Performed by: PHYSICIAN ASSISTANT

## 2022-11-02 PROCEDURE — 91313 COVID-19,PF,MODERNA BIVALENT: CPT | Performed by: PHYSICIAN ASSISTANT

## 2022-11-02 PROCEDURE — G0438 PPPS, INITIAL VISIT: HCPCS | Performed by: PHYSICIAN ASSISTANT

## 2022-11-02 PROCEDURE — 80061 LIPID PANEL: CPT | Performed by: PHYSICIAN ASSISTANT

## 2022-11-02 PROCEDURE — G0009 ADMIN PNEUMOCOCCAL VACCINE: HCPCS | Performed by: PHYSICIAN ASSISTANT

## 2022-11-02 RX ORDER — FOLIC ACID 1 MG/1
2 TABLET ORAL DAILY
COMMUNITY
Start: 2022-02-22 | End: 2023-02-22

## 2022-11-02 RX ORDER — FLUOXETINE 40 MG/1
80 CAPSULE ORAL DAILY
Qty: 180 CAPSULE | Refills: 0 | Status: SHIPPED | OUTPATIENT
Start: 2022-11-02 | End: 2023-02-06

## 2022-11-02 RX ORDER — PRAVASTATIN SODIUM 40 MG
40 TABLET ORAL DAILY
Qty: 90 TABLET | Refills: 3 | Status: SHIPPED | OUTPATIENT
Start: 2022-11-02 | End: 2023-10-31

## 2022-11-02 RX ORDER — LOSARTAN POTASSIUM 50 MG/1
50 TABLET ORAL DAILY
Qty: 90 TABLET | Refills: 3 | Status: SHIPPED | OUTPATIENT
Start: 2022-11-02 | End: 2023-10-31

## 2022-11-02 RX ORDER — ALPRAZOLAM 0.5 MG
0.5 TABLET ORAL
COMMUNITY
End: 2022-12-08

## 2022-11-02 ASSESSMENT — ANXIETY QUESTIONNAIRES
3. WORRYING TOO MUCH ABOUT DIFFERENT THINGS: SEVERAL DAYS
IF YOU CHECKED OFF ANY PROBLEMS ON THIS QUESTIONNAIRE, HOW DIFFICULT HAVE THESE PROBLEMS MADE IT FOR YOU TO DO YOUR WORK, TAKE CARE OF THINGS AT HOME, OR GET ALONG WITH OTHER PEOPLE: SOMEWHAT DIFFICULT
GAD7 TOTAL SCORE: 5
5. BEING SO RESTLESS THAT IT IS HARD TO SIT STILL: NOT AT ALL
8. IF YOU CHECKED OFF ANY PROBLEMS, HOW DIFFICULT HAVE THESE MADE IT FOR YOU TO DO YOUR WORK, TAKE CARE OF THINGS AT HOME, OR GET ALONG WITH OTHER PEOPLE?: SOMEWHAT DIFFICULT
GAD7 TOTAL SCORE: 5
2. NOT BEING ABLE TO STOP OR CONTROL WORRYING: SEVERAL DAYS
4. TROUBLE RELAXING: SEVERAL DAYS
GAD7 TOTAL SCORE: 5
6. BECOMING EASILY ANNOYED OR IRRITABLE: SEVERAL DAYS
1. FEELING NERVOUS, ANXIOUS, OR ON EDGE: SEVERAL DAYS
7. FEELING AFRAID AS IF SOMETHING AWFUL MIGHT HAPPEN: NOT AT ALL
7. FEELING AFRAID AS IF SOMETHING AWFUL MIGHT HAPPEN: NOT AT ALL

## 2022-11-02 ASSESSMENT — ENCOUNTER SYMPTOMS
HEMATURIA: 0
ABDOMINAL PAIN: 0
BREAST MASS: 0
SHORTNESS OF BREATH: 0
WEAKNESS: 0
FEVER: 0
JOINT SWELLING: 1
DYSURIA: 0
PALPITATIONS: 0
HEADACHES: 1
HEMATOCHEZIA: 0
ARTHRALGIAS: 1
CONSTIPATION: 0
DIZZINESS: 1
PARESTHESIAS: 0
EYE PAIN: 1
HEARTBURN: 0
COUGH: 0
DIARRHEA: 0
SORE THROAT: 0
NAUSEA: 0
CHILLS: 0
FREQUENCY: 0
MYALGIAS: 1
NERVOUS/ANXIOUS: 1

## 2022-11-02 ASSESSMENT — PATIENT HEALTH QUESTIONNAIRE - PHQ9
10. IF YOU CHECKED OFF ANY PROBLEMS, HOW DIFFICULT HAVE THESE PROBLEMS MADE IT FOR YOU TO DO YOUR WORK, TAKE CARE OF THINGS AT HOME, OR GET ALONG WITH OTHER PEOPLE: SOMEWHAT DIFFICULT
SUM OF ALL RESPONSES TO PHQ QUESTIONS 1-9: 8
SUM OF ALL RESPONSES TO PHQ QUESTIONS 1-9: 8

## 2022-11-02 ASSESSMENT — ACTIVITIES OF DAILY LIVING (ADL): CURRENT_FUNCTION: NO ASSISTANCE NEEDED

## 2022-11-02 ASSESSMENT — PAIN SCALES - GENERAL: PAINLEVEL: NO PAIN (0)

## 2022-11-02 NOTE — PROGRESS NOTES
"SUBJECTIVE:   Nannette is a 67 year old who presents for Preventive Visit.      Patient has been advised of split billing requirements and indicates understanding: Yes  Are you in the first 12 months of your Medicare coverage?  Yes,  Visual Acuity:  Right Eye: 20/32   Left Eye: 20/32  Both Eyes: 20/32    Healthy Habits:     In general, how would you rate your overall health?  Good    Frequency of exercise:  2-3 days/week    Duration of exercise:  15-30 minutes    Do you usually eat at least 4 servings of fruit and vegetables a day, include whole grains    & fiber and avoid regularly eating high fat or \"junk\" foods?  Yes    Taking medications regularly:  Yes    Medication side effects:  Not applicable    Ability to successfully perform activities of daily living:  No assistance needed    Home Safety:  No safety concerns identified    Hearing Impairment:  Feel that people are mumbling or not speaking clearly    In the past 6 months, have you been bothered by leaking of urine? Yes    In general, how would you rate your overall mental or emotional health?  Fair      PHQ-2 Total Score: 2    Additional concerns today:  No    Do you feel safe in your environment? Yes    Have you ever done Advance Care Planning? (For example, a Health Directive, POLST, or a discussion with a medical provider or your loved ones about your wishes): No, advance care planning information given to patient to review.  Advanced care planning was discussed at today's visit.       Fall risk  Fallen 2 or more times in the past year?: No  Any fall with injury in the past year?: Yes - NO, correction     Cognitive Screening   1) Repeat 3 items (Leader, Season, Table)    2) Clock draw: ABNORMAL minute hand in wrong spot  3) 3 item recall: Recalls 2 objects   Results: ABNORMAL clock, 1-2 items recalled: PROBABLE COGNITIVE IMPAIRMENT, **INFORM PROVIDER**    Mini-CogTM Copyright ISMAEL Crawford. Licensed by the author for use in [facility]; reprinted " with permission ([email or web address]). All rights reserved.      Do you have sleep apnea, excessive snoring or daytime drowsiness?: no    Reviewed and updated as needed this visit by clinical staff   Tobacco  Allergies  Meds   Med Hx  Surg Hx  Fam Hx  Soc Hx        Reviewed and updated as needed this visit by Provider                 Social History     Tobacco Use     Smoking status: Former     Types: Cigarettes     Quit date: 12/9/2006     Years since quitting: 15.9     Smokeless tobacco: Never   Substance Use Topics     Alcohol use: No         Alcohol Use 11/2/2022   Prescreen: >3 drinks/day or >7 drinks/week? No   Prescreen: >3 drinks/day or >7 drinks/week? -       PROBLEMS TO ADD ON...  -------------------------------------  Needing refills and/or labs for:  Hypertension  Is blood pressure being checked at home? Yes  Medication side effects? No    Hyperlipidemia  Following a low fat diet? Yes  Medication side effects? No    Depression/Anxiety  Update PHQ/FLORIAN  Using Xanax more - 10 in the last 2 months    Additional medications:  NONE      Current providers sharing in care for this patient include:   Patient Care Team:  Leonela Ibanez PA-C as PCP - General (Physician Assistant)  Leonela Ibanez PA-C as Assigned PCP  Francesco Jovel MD as Assigned Musculoskeletal Provider  Eddie Roberto MD as Assigned Neuroscience Provider    The following health maintenance items are reviewed in Epic and correct as of today:  Health Maintenance   Topic Date Due     HEPATITIS B IMMUNIZATION (1 of 3 - 3-dose series) Never done     ZOSTER IMMUNIZATION (1 of 2) Never done     MICROALBUMIN  11/27/2021     CMP  06/18/2022     LIPID  06/18/2022     PHQ-9  05/02/2023     DEXA  07/13/2023     MAMMO SCREENING  07/30/2023     MEDICARE ANNUAL WELLNESS VISIT  11/02/2023     ANNUAL REVIEW OF HM ORDERS  11/02/2023     FALL RISK ASSESSMENT  11/02/2023     ADVANCE CARE PLANNING  11/02/2027     COLORECTAL CANCER SCREENING   12/19/2027     DTAP/TDAP/TD IMMUNIZATION (4 - Td or Tdap) 04/16/2030     HEPATITIS C SCREENING  Completed     INFLUENZA VACCINE  Completed     Pneumococcal Vaccine: 65+ Years  Completed     COVID-19 Vaccine  Completed     IPV IMMUNIZATION  Aged Out     MENINGITIS IMMUNIZATION  Aged Out     PAP  Discontinued     Lab work is in process  Labs reviewed in EPIC  Any new diagnosis of family breast, ovarian, or bowel cancer? No    FHS-7:   Breast CA Risk Assessment (FHS-7) 7/30/2021   Did any of your first-degree relatives have breast or ovarian cancer? No   Did any of your relatives have bilateral breast cancer? No   Did any man in your family have breast cancer? No   Did any woman in your family have breast and ovarian cancer? No   Did any woman in your family have breast cancer before age 50 y? No   Do you have 2 or more relatives with breast and/or ovarian cancer? No   Do you have 2 or more relatives with breast and/or bowel cancer? No       Mammogram Screening: Recommended mammography every 1-2 years with patient discussion and risk factor consideration  Pertinent mammograms are reviewed under the imaging tab.    Review of Systems   Constitutional: Negative for chills and fever.   HENT: Positive for ear pain and hearing loss. Negative for congestion and sore throat.    Eyes: Positive for pain and visual disturbance.   Respiratory: Negative for cough and shortness of breath.    Cardiovascular: Positive for peripheral edema. Negative for chest pain and palpitations.   Gastrointestinal: Negative for abdominal pain, constipation, diarrhea, heartburn, hematochezia and nausea.   Breasts:  Negative for tenderness, breast mass and discharge.   Genitourinary: Negative for dysuria, frequency, genital sores, hematuria, pelvic pain, urgency, vaginal bleeding and vaginal discharge.   Musculoskeletal: Positive for arthralgias, joint swelling and myalgias.   Skin: Negative for rash.   Neurological: Positive for dizziness and  "headaches. Negative for weakness and paresthesias.   Psychiatric/Behavioral: Positive for mood changes. The patient is nervous/anxious.        OBJECTIVE:   /70 (BP Location: Right arm, Patient Position: Sitting, Cuff Size: Adult Regular)   Pulse 80   Temp 97.5  F (36.4  C) (Tympanic)   Resp 20   Ht 1.683 m (5' 6.26\")   Wt 87.2 kg (192 lb 3.2 oz)   SpO2 96%   BMI 30.78 kg/m   Estimated body mass index is 30.78 kg/m  as calculated from the following:    Height as of this encounter: 1.683 m (5' 6.26\").    Weight as of this encounter: 87.2 kg (192 lb 3.2 oz).  Physical Exam  GENERAL: healthy, alert and no distress  EYES: Eyes grossly normal to inspection, PERRL and conjunctivae and sclerae normal  HENT: ear canals and TM's normal, nose and mouth without ulcers or lesions  NECK: no adenopathy, no asymmetry, masses, or scars and thyroid normal to palpation  RESP: lungs clear to auscultation - no rales, rhonchi or wheezes  BREAST: normal without masses, tenderness or nipple discharge and no palpable axillary masses or adenopathy  CV: regular rates and rhythm, normal S1 S2, no S3 or S4 and no murmur, click or rub  ABDOMEN: soft, nontender, no hepatosplenomegaly, no masses and bowel sounds normal  MS: no gross musculoskeletal defects noted, no edema  SKIN: no suspicious lesions or rashes  NEURO: Normal strength and tone, mentation intact and speech normal  PSYCH: mentation appears normal, affect normal/bright      ASSESSMENT / PLAN:       ICD-10-CM    1. Encounter for Medicare annual wellness exam  Z00.00       2. High priority for 2019-nCoV vaccine  Z23       3. Hypertension, goal below 140/90  I10 Albumin Random Urine Quantitative with Creat Ratio     COMPREHENSIVE METABOLIC PANEL     COMPREHENSIVE METABOLIC PANEL     Albumin Random Urine Quantitative with Creat Ratio     losartan (COZAAR) 50 MG tablet      4. Hyperlipidemia LDL goal <130  E78.5 Lipid panel reflex to direct LDL Non-fasting     Lipid panel " "reflex to direct LDL Non-fasting     pravastatin (PRAVACHOL) 40 MG tablet      5. Anxiety and depression  F41.9 FLUoxetine (PROZAC) 40 MG capsule    F32.A       6. Rheumatoid arthritis with positive rheumatoid factor, involving unspecified site (H)  M05.9           1,2) Screenings discussed    3,4) Routine labs in process. Above meds renewed, no changes. Blood pressure at goal.     5) PHQ above goal, FLORIAN looks ok. Patient would like to increase her Prozac. Dose increased to 80mg daily. Follow up e-visit in 1 month.    6) Follows rheumatology       COUNSELING:  Reviewed preventive health counseling, as reflected in patient instructions    Estimated body mass index is 30.78 kg/m  as calculated from the following:    Height as of this encounter: 1.683 m (5' 6.26\").    Weight as of this encounter: 87.2 kg (192 lb 3.2 oz).    She reports that she quit smoking about 15 years ago. Her smoking use included cigarettes. She has never used smokeless tobacco.      Appropriate preventive services were discussed with this patient, including applicable screening as appropriate for cardiovascular disease, diabetes, osteopenia/osteoporosis, and glaucoma.  As appropriate for age/gender, discussed screening for colorectal cancer, prostate cancer, breast cancer, and cervical cancer. Checklist reviewing preventive services available has been given to the patient.    Reviewed patients plan of care and provided an AVS. The Basic Care Plan (routine screening as documented in Health Maintenance) for Erica meets the Care Plan requirement. This Care Plan has been established and reviewed with the Patient.    Counseling Resources:  ATP IV Guidelines  Pooled Cohorts Equation Calculator  Breast Cancer Risk Calculator  Breast Cancer: Medication to Reduce Risk  FRAX Risk Assessment  ICSI Preventive Guidelines  Dietary Guidelines for Americans, 2010  USDA's MyPlate  ASA Prophylaxis  Lung CA Screening    Leonela Ibanez PA-C  [facility] " [facility] [place]    Identified Health Risks:

## 2022-11-03 LAB
ALBUMIN SERPL-MCNC: 3.9 G/DL (ref 3.4–5)
ALP SERPL-CCNC: 41 U/L (ref 40–150)
ALT SERPL W P-5'-P-CCNC: 29 U/L (ref 0–50)
ANION GAP SERPL CALCULATED.3IONS-SCNC: 2 MMOL/L (ref 3–14)
AST SERPL W P-5'-P-CCNC: 20 U/L (ref 0–45)
BILIRUB SERPL-MCNC: 0.4 MG/DL (ref 0.2–1.3)
BUN SERPL-MCNC: 22 MG/DL (ref 7–30)
CALCIUM SERPL-MCNC: 8.7 MG/DL (ref 8.5–10.1)
CHLORIDE BLD-SCNC: 107 MMOL/L (ref 94–109)
CHOLEST SERPL-MCNC: 192 MG/DL
CO2 SERPL-SCNC: 30 MMOL/L (ref 20–32)
CREAT SERPL-MCNC: 0.66 MG/DL (ref 0.52–1.04)
CREAT UR-MCNC: 103 MG/DL
FASTING STATUS PATIENT QL REPORTED: YES
GFR SERPL CREATININE-BSD FRML MDRD: >90 ML/MIN/1.73M2
GLUCOSE BLD-MCNC: 84 MG/DL (ref 70–99)
HDLC SERPL-MCNC: 65 MG/DL
LDLC SERPL CALC-MCNC: 109 MG/DL
MICROALBUMIN UR-MCNC: 12 MG/L
MICROALBUMIN/CREAT UR: 11.65 MG/G CR (ref 0–25)
NONHDLC SERPL-MCNC: 127 MG/DL
POTASSIUM BLD-SCNC: 4.8 MMOL/L (ref 3.4–5.3)
PROT SERPL-MCNC: 7.2 G/DL (ref 6.8–8.8)
SODIUM SERPL-SCNC: 139 MMOL/L (ref 133–144)
TRIGL SERPL-MCNC: 90 MG/DL

## 2022-12-08 ENCOUNTER — OFFICE VISIT (OUTPATIENT)
Dept: FAMILY MEDICINE | Facility: CLINIC | Age: 67
End: 2022-12-08
Payer: MEDICARE

## 2022-12-08 VITALS
RESPIRATION RATE: 16 BRPM | DIASTOLIC BLOOD PRESSURE: 84 MMHG | WEIGHT: 189.8 LBS | HEART RATE: 92 BPM | TEMPERATURE: 98 F | OXYGEN SATURATION: 96 % | SYSTOLIC BLOOD PRESSURE: 136 MMHG | BODY MASS INDEX: 30.39 KG/M2

## 2022-12-08 DIAGNOSIS — H25.9 AGE-RELATED CATARACT OF BOTH EYES, UNSPECIFIED AGE-RELATED CATARACT TYPE: ICD-10-CM

## 2022-12-08 DIAGNOSIS — M05.9 RHEUMATOID ARTHRITIS WITH POSITIVE RHEUMATOID FACTOR, INVOLVING UNSPECIFIED SITE (H): ICD-10-CM

## 2022-12-08 DIAGNOSIS — Z01.818 PREOP GENERAL PHYSICAL EXAM: Primary | ICD-10-CM

## 2022-12-08 PROCEDURE — 99214 OFFICE O/P EST MOD 30 MIN: CPT | Performed by: FAMILY MEDICINE

## 2022-12-08 ASSESSMENT — ANXIETY QUESTIONNAIRES
GAD7 TOTAL SCORE: 0
IF YOU CHECKED OFF ANY PROBLEMS ON THIS QUESTIONNAIRE, HOW DIFFICULT HAVE THESE PROBLEMS MADE IT FOR YOU TO DO YOUR WORK, TAKE CARE OF THINGS AT HOME, OR GET ALONG WITH OTHER PEOPLE: NOT DIFFICULT AT ALL
2. NOT BEING ABLE TO STOP OR CONTROL WORRYING: NOT AT ALL
5. BEING SO RESTLESS THAT IT IS HARD TO SIT STILL: NOT AT ALL
3. WORRYING TOO MUCH ABOUT DIFFERENT THINGS: NOT AT ALL
7. FEELING AFRAID AS IF SOMETHING AWFUL MIGHT HAPPEN: NOT AT ALL
1. FEELING NERVOUS, ANXIOUS, OR ON EDGE: NOT AT ALL
GAD7 TOTAL SCORE: 0
6. BECOMING EASILY ANNOYED OR IRRITABLE: NOT AT ALL

## 2022-12-08 ASSESSMENT — PAIN SCALES - GENERAL: PAINLEVEL: NO PAIN (0)

## 2022-12-08 ASSESSMENT — PATIENT HEALTH QUESTIONNAIRE - PHQ9
5. POOR APPETITE OR OVEREATING: NOT AT ALL
SUM OF ALL RESPONSES TO PHQ QUESTIONS 1-9: 0

## 2022-12-08 NOTE — PROGRESS NOTES
[facility]  [address]  [place] [address]  Phone: [phone]  Primary Provider: Leonela Ibanez  Pre-op Performing Provider: HOLLI MENDOZA      PREOPERATIVE EVALUATION:  Today's date: 12/8/2022    Erica Ramos is a 67 year old female who presents for a preoperative evaluation.    Surgical Information:  Surgery/Procedure: cataracts- bilateral   Surgery Location: [facility]  Surgeon: Morris Mcneill  Surgery Date: 12/16 (left) & 1/6 (right)  Time of Surgery: TBD  Where patient plans to recover: At home with family  Fax number for surgical facility: [phone]    Type of Anesthesia Anticipated: General    Assessment & Plan     The proposed surgical procedure is considered LOW risk.    Preop general physical exam      Age-related cataract of both eyes, unspecified age-related cataract type      Rheumatoid arthritis with positive rheumatoid factor, involving unspecified site (H)  - On Humira and weekly Methotrexate.   - Following with Rheumatologist. Patient to reach out and inform her Rheumatologist of the upcoming surgery and for pre-op specialty medication management.         Risks and Recommendations:  The patient has the following additional risks and recommendations for perioperative complications:   - No identified additional risk factors other than previously addressed    Medication Instructions:  Patient is to take all scheduled medications on the day of surgery EXCEPT for modifications listed below:  Hold ASA and Vitamins a week prior to surgery.   Pre-op specialty medication management per Rheumatologist.       RECOMMENDATION:  APPROVAL GIVEN to proceed with proposed procedure, without further diagnostic evaluation.      Subjective     HPI related to upcoming procedure:     Nannette is a 67 year old pleasant female here for a Pre-op exam.   She has a known history of bilateral cataracts- has been seen and evaluated by Ophthalmologist and  recommended bilateral cataract extraction.   States that she understands the risks and benefits of the procedure and wishes to proceed.     Denies having any concerns.     Preop Questions 12/8/2022   1. Have you ever had a heart attack or stroke? No   2. Have you ever had surgery on your heart or blood vessels, such as a stent placement, a coronary artery bypass, or surgery on an artery in your head, neck, heart, or legs? No   3. Do you have chest pain with activity? No   4. Do you have a history of  heart failure? No   5. Do you currently have a cold, bronchitis or symptoms of other infection? No   6. Do you have a cough, shortness of breath, or wheezing? No   7. Do you or anyone in your family have previous history of blood clots? No   8. Do you or does anyone in your family have a serious bleeding problem such as prolonged bleeding following surgeries or cuts? No   9. Have you ever had problems with anemia or been told to take iron pills? No   10. Have you had any abnormal blood loss such as black, tarry or bloody stools, or abnormal vaginal bleeding? No   11. Have you ever had a blood transfusion? No   12. Are you willing to have a blood transfusion if it is medically needed before, during, or after your surgery? NO    13. Have you or any of your relatives ever had problems with anesthesia? No   14. Do you have sleep apnea, excessive snoring or daytime drowsiness? No   15. Do you have any artifical heart valves or other implanted medical devices like a pacemaker, defibrillator, or continuous glucose monitor? No   16. Do you have artificial joints? No   17. Are you allergic to latex? No       Health Care Directive:  Patient does not have a Health Care Directive or Living Will: Discussed advance care planning with patient; however, patient declined at this time.    Preoperative Review of :   reviewed - no record of controlled substances prescribed.  \    Status of Chronic Conditions:  See problem list for  active medical problems.  Problems all longstanding and stable, except as noted/documented.  See ROS for pertinent symptoms related to these conditions.      Review of Systems  Constitutional, neuro, ENT, endocrine, pulmonary, cardiac, gastrointestinal, genitourinary, musculoskeletal, integument and psychiatric systems are negative, except as otherwise noted.    Patient Active Problem List    Diagnosis Date Noted     Osteopenia of both hips 07/14/2020     Priority: Medium     Started Fosamax 05/2021 by rheumatology        Cerebral aneurysm 08/24/2018     Priority: Medium     Rheumatoid arthritis with positive rheumatoid factor, involving unspecified site (H) 08/28/2017     Priority: Medium     Anxiety and depression 02/07/2017     Priority: Medium     Hypertension, goal below 140/90 05/13/2016     Priority: Medium     Hyperlipidemia LDL goal <130 01/30/2014     Priority: Medium     Advanced directives, counseling/discussion 08/24/2012     Priority: Medium     Discussed advance care planning with patient; information given to patient to review. 8/24/2012          Obesity 01/03/2012     Priority: Medium      Past Medical History:   Diagnosis Date     Brain aneurysm      Cataract      Depression, major      Hearing loss     wears hearing aid.     Hyperlipidaemia      Rheumatoid arthritis involving both hands with positive rheumatoid factor (H)      Past Surgical History:   Procedure Laterality Date     COLONOSCOPY WITH CO2 INSUFFLATION N/A 12/19/2017    Procedure: COLONOSCOPY WITH CO2 INSUFFLATION;  COLON SCREEN/ KNAEBLE;  Surgeon: Romeo Almonte MD;  Location: [facility] OR     D & C      polyps     SURGICAL HISTORY OF -   08/2008    aneurysm repair     Current Outpatient Medications   Medication Sig Dispense Refill     adalimumab (HUMIRA) 40 MG/0.8ML prefilled syringe kit Inject 40 mg Subcutaneous       alendronate (FOSAMAX) 70 MG tablet Take 70 mg by mouth        ASPIRIN ADULT LOW STRENGTH PO        CALCIUM-VITAMIN  D PO        diclofenac (VOLTAREN) 1 % topical gel Place 4 g onto the skin       FLUoxetine (PROZAC) 40 MG capsule Take 2 capsules (80 mg) by mouth daily - dose increase 22 180 capsule 0     folic acid (FOLVITE) 1 MG tablet Take 2 mg by mouth daily       gabapentin (NEURONTIN) 100 MG capsule Take 1 capsule (100 mg) by mouth 2 times daily 120 capsule 2     leucovorin (WELLCOVORIN) 5 MG tablet Take 5 mg by mouth daily        losartan (COZAAR) 50 MG tablet Take 1 tablet (50 mg) by mouth daily 90 tablet 3     methotrexate 2.5 MG tablet CHEMO Take by mouth every 7 days 7 tabs       pravastatin (PRAVACHOL) 40 MG tablet Take 1 tablet (40 mg) by mouth daily 90 tablet 3       No Known Allergies     Social History     Tobacco Use     Smoking status: Former     Types: Cigarettes     Quit date: 2006     Years since quittin.0     Smokeless tobacco: Never   Substance Use Topics     Alcohol use: No     Family History   Problem Relation Age of Onset     Cancer Mother         non hodgkins lymphoma     C.A.D. Father      Hypertension Father      Cerebrovascular Disease Father      Hypertension Brother      Cancer Daughter         lymphoma     Bleeding Disorder No family hx of      Anesthesia Reaction No family hx of      History   Drug Use No         Objective     /84   Pulse 92   Temp 98  F (36.7  C) (Tympanic)   Resp 16   Wt 86.1 kg (189 lb 12.8 oz)   SpO2 96%   BMI 30.39 kg/m      Physical Exam    GENERAL APPEARANCE: healthy, alert and no distress. Hard of hearing. Has hearing aids in place.      EYES: EOMI, PERRL     HENT: ear canals and TM's normal and nose and mouth without ulcers or lesions     NECK: no adenopathy, no asymmetry, masses, or scars and thyroid normal to palpation     RESP: lungs clear to auscultation - no rales, rhonchi or wheezes     CV: regular rates and rhythm, normal S1 S2, no S3 or S4 and no murmur, click or rub     ABDOMEN:  soft, nontender, no HSM or masses and bowel sounds  normal     MS: extremities normal- no gross deformities noted, no evidence of inflammation in joints, FROM in all extremities.     SKIN: no suspicious lesions or rashes     NEURO: Normal strength and tone, sensory exam grossly normal, mentation intact and speech normal     PSYCH: mentation appears normal. and affect normal/bright     LYMPHATICS: No cervical adenopathy    Recent Labs   Lab Test 11/02/22  0937 08/18/21  1549 06/18/21  0739   HGB  --  13.9  --    PLT  --  440  --      --  139   POTASSIUM 4.8  --  4.3   CR 0.66  --  0.64   A1C  --  5.4  --         Diagnostics:  No labs were ordered during this visit.   No EKG required for low risk surgery (cataract, skin procedure, breast biopsy, etc).    Revised Cardiac Risk Index (RCRI):  The patient has the following serious cardiovascular risks for perioperative complications:   - No serious cardiac risks = 0 points     RCRI Interpretation: 0 points: Class I (very low risk - 0.4% complication rate)           Signed Electronically by: Marcy Shoemaker MD  Copy of this evaluation report is provided to requesting physician.

## 2022-12-08 NOTE — PATIENT INSTRUCTIONS
Preparing for Your Surgery  Getting started  A nurse will call you to review your health history and instructions. They will give you an arrival time based on your scheduled surgery time. Please be ready to share:    Your doctor's clinic name and phone number    Your medical, surgical, and anesthesia history    A list of allergies and sensitivities    A list of medicines, including herbal treatments and over-the-counter drugs    Whether the patient has a legal guardian (ask how to send us the papers in advance)  Please tell us if you're pregnant--or if there's any chance you might be pregnant. Some surgeries may injure a fetus (unborn baby), so they require a pregnancy test. Surgeries that are safe for a fetus don't always need a test, and you can choose whether to have one.   If you have a child who's having surgery, please ask for a copy of Preparing for Your Child's Surgery.    Preparing for surgery    Within 10 to 30 days of surgery: Have a pre-op exam (sometimes called an H&P, or History and Physical). This can be done at a clinic or pre-operative center.  ? If you're having a , you may not need this exam. Talk to your care team.    At your pre-op exam, talk to your care team about all medicines you take. If you need to stop any medicines before surgery, ask when to start taking them again.  ? We do this for your safety. Many medicines can make you bleed too much during surgery. Some change how well surgery (anesthesia) drugs work.    Call your insurance company to let them know you're having surgery. (If you don't have insurance, call [phone].)    Call your clinic if there's any change in your health. This includes signs of a cold or flu (sore throat, runny nose, cough, rash, fever). It also includes a scrape or scratch near the surgery site.    If you have questions on the day of surgery, call your hospital or surgery center.  COVID testing  You may need to be tested for COVID-19 before having  surgery. If so, we will give you instructions (or click here).  Eating and drinking guidelines  For your safety: Unless your surgeon tells you otherwise, follow the guidelines below.    Eat and drink as usual until 8 hours before you arrive for surgery. After that, no food or milk.    Drink clear liquids until 2 hours before you arrive. These are liquids you can see through, like water, Gatorade, and Propel Water. They also include plain black coffee and tea (no cream or milk), candy, and breath mints. You can spit out gum when you arrive.    If you drink alcohol: Stop drinking it the night before surgery.    If your care team tells you to take medicine on the morning of surgery, it's okay to take it with a sip of water.  Preventing infection    Shower or bathe the night before and morning of your surgery. Follow the instructions your clinic gave you. (If no instructions, use regular soap.)    Don't shave or clip hair near your surgery site. We'll remove the hair if needed.    Don't smoke or vape the morning of surgery. You may chew nicotine gum up to 2 hours before surgery. A nicotine patch is okay.  ? Note: Some surgeries require you to completely quit smoking and nicotine. Check with your surgeon.    Your care team will make every effort to keep you safe from infection. We will:  ? Clean our hands often with soap and water (or an alcohol-based hand rub).  ? Clean the skin at your surgery site with a special soap that kills germs.  ? Give you a special gown to keep you warm. (Cold raises the risk of infection.)  ? Wear special hair covers, masks, gowns and gloves during surgery.  ? Give antibiotic medicine, if prescribed. Not all surgeries need antibiotics.  What to bring on the day of surgery    Photo ID and insurance card    Copy of your health care directive, if you have one    Glasses and hearing aids (bring cases)  ? You can't wear contacts during surgery    Inhaler and eye drops, if you use them (tell us  about these when you arrive)    CPAP machine or breathing device, if you use them    A few personal items, if spending the night    If you have . . .  ? A pacemaker, ICD (cardiac defibrillator) or other implant: Bring the ID card.  ? An implanted stimulator: Bring the remote control.  ? A legal guardian: Bring a copy of the certified (court-stamped) guardianship papers.  Please remove any jewelry, including body piercings. Leave jewelry and other valuables at home.  If you're going home the day of surgery    You must have a responsible adult drive you home. They should stay with you overnight as well.    If you don't have someone to stay with you, and you aren't safe to go home alone, we may keep you overnight. Insurance often won't pay for this.  After surgery  If it's hard to control your pain or you need more pain medicine, please call your surgeon's office.  Questions?   If you have any questions for your care team, list them here: _________________________________________________________________________________________________________________________________________________________________________ ____________________________________ ____________________________________ ____________________________________  For informational purposes only. Not to replace the advice of your health care provider. Copyright   2003, 2019 [facility]. All rights reserved. Clinically reviewed by Marion Fry MD. [employer] [id number] - REV 10/22.

## 2023-02-06 ENCOUNTER — TELEPHONE (OUTPATIENT)
Dept: NEUROLOGY | Facility: CLINIC | Age: 68
End: 2023-02-06
Payer: MEDICARE

## 2023-02-06 NOTE — TELEPHONE ENCOUNTER
Called and spoke to patient. Received refill request for gabapentin, but patient has not seen Dr. Roberto recently. She stated that she does not currently need a refill, but she will go to her PCP when she does. No further questions.

## 2023-08-20 DIAGNOSIS — F32.A ANXIETY AND DEPRESSION: ICD-10-CM

## 2023-08-20 DIAGNOSIS — F41.9 ANXIETY AND DEPRESSION: ICD-10-CM

## 2023-08-21 RX ORDER — FLUOXETINE 40 MG/1
80 CAPSULE ORAL DAILY
Qty: 180 CAPSULE | Refills: 0 | Status: SHIPPED | OUTPATIENT
Start: 2023-08-21 | End: 2023-10-31

## 2023-10-16 ENCOUNTER — TELEPHONE (OUTPATIENT)
Dept: FAMILY MEDICINE | Facility: CLINIC | Age: 68
End: 2023-10-16
Payer: MEDICARE

## 2023-10-16 NOTE — TELEPHONE ENCOUNTER
Reason for Call:  Appointment Request    Patient requesting this type of appt: Chronic Diease Management/Medication/Follow-Up    Requested provider: Leonela Ibanez    Reason patient unable to be scheduled: Not within requested timeframe    When does patient want to be seen/preferred time: 1-2 days    Comments: please call to schedule     Could we send this information to you in [facility] or would you prefer to receive a phone call?:   Patient would prefer a phone call   Okay to leave a detailed message?: Yes at Cell number on file:    Telephone Information:   Mobile [phone]       Call taken on 10/16/2023 at 1:59 PM by Nuha Erazo

## 2023-10-17 NOTE — TELEPHONE ENCOUNTER
Spoke with patient, she is requesting to be seen for follow up on her blood pressure. She states her Blood pressure is all over the place even on the medication.

## 2023-10-17 NOTE — TELEPHONE ENCOUNTER
I called patient, she is going out of town 11/12 for a couple weeks so the next available with Leonela would be 11/29.    She says her BP sometimes is quite low, 90's over 70's and goes as high as the 140's.   I advised it might not be wise to increase her med if she is having low BP's as well.   She wants to be seen before her vacation.   I searched wellness visits and Kirk Adams's schedule was available for this, patient has seen Kirk before.    Scheduled for 10/31/23.   She will keep records of daily BP checks and will let us know if she is running out of losartan; she is not home now so does not know how much she has left.    Camille FAULKNER RN  [facility] Triage

## 2023-10-17 NOTE — TELEPHONE ENCOUNTER
We could add a low dose of hydrochlorothiazide to her losartan. How much losartan does she have left at home?  Ok to move her physical up from January to November. Use a 1:10pm Wed afternoon slot for in person

## 2023-10-31 ENCOUNTER — TELEPHONE (OUTPATIENT)
Dept: FAMILY MEDICINE | Facility: CLINIC | Age: 68
End: 2023-10-31

## 2023-10-31 ENCOUNTER — OFFICE VISIT (OUTPATIENT)
Dept: FAMILY MEDICINE | Facility: CLINIC | Age: 68
End: 2023-10-31
Payer: MEDICARE

## 2023-10-31 VITALS
OXYGEN SATURATION: 96 % | BODY MASS INDEX: 28.45 KG/M2 | HEIGHT: 66 IN | SYSTOLIC BLOOD PRESSURE: 126 MMHG | TEMPERATURE: 97.3 F | RESPIRATION RATE: 16 BRPM | HEART RATE: 73 BPM | DIASTOLIC BLOOD PRESSURE: 85 MMHG | WEIGHT: 177 LBS

## 2023-10-31 DIAGNOSIS — M05.9 RHEUMATOID ARTHRITIS WITH POSITIVE RHEUMATOID FACTOR, INVOLVING UNSPECIFIED SITE (H): ICD-10-CM

## 2023-10-31 DIAGNOSIS — S06.6X0D: ICD-10-CM

## 2023-10-31 DIAGNOSIS — S01.90XD: ICD-10-CM

## 2023-10-31 DIAGNOSIS — E78.5 HYPERLIPIDEMIA LDL GOAL <130: ICD-10-CM

## 2023-10-31 DIAGNOSIS — I10 HYPERTENSION, GOAL BELOW 140/90: ICD-10-CM

## 2023-10-31 DIAGNOSIS — L57.0 ACTINIC KERATOSIS: ICD-10-CM

## 2023-10-31 DIAGNOSIS — G62.9 NEUROPATHY: ICD-10-CM

## 2023-10-31 DIAGNOSIS — Z00.00 ENCOUNTER FOR ANNUAL WELLNESS EXAM IN MEDICARE PATIENT: Primary | ICD-10-CM

## 2023-10-31 DIAGNOSIS — Z12.31 VISIT FOR SCREENING MAMMOGRAM: ICD-10-CM

## 2023-10-31 LAB
ALBUMIN SERPL BCG-MCNC: 4.3 G/DL (ref 3.5–5.2)
ALP SERPL-CCNC: 45 U/L (ref 35–104)
ALT SERPL W P-5'-P-CCNC: 17 U/L (ref 0–50)
ANION GAP SERPL CALCULATED.3IONS-SCNC: 10 MMOL/L (ref 7–15)
AST SERPL W P-5'-P-CCNC: 26 U/L (ref 0–45)
BILIRUB SERPL-MCNC: 0.5 MG/DL
BUN SERPL-MCNC: 18.4 MG/DL (ref 8–23)
CALCIUM SERPL-MCNC: 9.3 MG/DL (ref 8.8–10.2)
CHLORIDE SERPL-SCNC: 104 MMOL/L (ref 98–107)
CHOLEST SERPL-MCNC: 185 MG/DL
CREAT SERPL-MCNC: 0.71 MG/DL (ref 0.51–0.95)
CREAT UR-MCNC: 85.3 MG/DL
DEPRECATED HCO3 PLAS-SCNC: 26 MMOL/L (ref 22–29)
EGFRCR SERPLBLD CKD-EPI 2021: >90 ML/MIN/1.73M2
GLUCOSE SERPL-MCNC: 88 MG/DL (ref 70–99)
HDLC SERPL-MCNC: 65 MG/DL
LDLC SERPL CALC-MCNC: 102 MG/DL
MICROALBUMIN UR-MCNC: <12 MG/L
MICROALBUMIN/CREAT UR: NORMAL MG/G{CREAT}
NONHDLC SERPL-MCNC: 120 MG/DL
POTASSIUM SERPL-SCNC: 4.9 MMOL/L (ref 3.4–5.3)
PROT SERPL-MCNC: 7.3 G/DL (ref 6.4–8.3)
SODIUM SERPL-SCNC: 140 MMOL/L (ref 135–145)
TRIGL SERPL-MCNC: 91 MG/DL

## 2023-10-31 PROCEDURE — 17000 DESTRUCT PREMALG LESION: CPT | Performed by: PHYSICIAN ASSISTANT

## 2023-10-31 PROCEDURE — 80053 COMPREHEN METABOLIC PANEL: CPT | Performed by: PHYSICIAN ASSISTANT

## 2023-10-31 PROCEDURE — 82570 ASSAY OF URINE CREATININE: CPT | Performed by: PHYSICIAN ASSISTANT

## 2023-10-31 PROCEDURE — 82043 UR ALBUMIN QUANTITATIVE: CPT | Performed by: PHYSICIAN ASSISTANT

## 2023-10-31 PROCEDURE — 99207 PR ANNUAL WELLNESS VISIT, PPS, SUBSEQUENT STAT: CPT | Performed by: PHYSICIAN ASSISTANT

## 2023-10-31 PROCEDURE — 99214 OFFICE O/P EST MOD 30 MIN: CPT | Mod: 25 | Performed by: PHYSICIAN ASSISTANT

## 2023-10-31 PROCEDURE — 80061 LIPID PANEL: CPT | Performed by: PHYSICIAN ASSISTANT

## 2023-10-31 PROCEDURE — 36415 COLL VENOUS BLD VENIPUNCTURE: CPT | Performed by: PHYSICIAN ASSISTANT

## 2023-10-31 RX ORDER — GABAPENTIN 100 MG/1
100 CAPSULE ORAL 2 TIMES DAILY
Qty: 120 CAPSULE | Refills: 2 | Status: SHIPPED | OUTPATIENT
Start: 2023-10-31 | End: 2024-07-22

## 2023-10-31 RX ORDER — LOSARTAN POTASSIUM 50 MG/1
50 TABLET ORAL DAILY
Qty: 90 TABLET | Refills: 3 | Status: SHIPPED | OUTPATIENT
Start: 2023-10-31

## 2023-10-31 RX ORDER — PRAVASTATIN SODIUM 40 MG
40 TABLET ORAL DAILY
Qty: 90 TABLET | Refills: 3 | Status: SHIPPED | OUTPATIENT
Start: 2023-10-31

## 2023-10-31 RX ORDER — RESPIRATORY SYNCYTIAL VIRUS VACCINE 120MCG/0.5
0.5 KIT INTRAMUSCULAR ONCE
Qty: 1 EACH | Refills: 0 | Status: CANCELLED | OUTPATIENT
Start: 2023-10-31 | End: 2023-10-31

## 2023-10-31 ASSESSMENT — ENCOUNTER SYMPTOMS
HEMATOCHEZIA: 0
HEMATURIA: 0
BREAST MASS: 0
FREQUENCY: 0
DYSURIA: 0
NAUSEA: 0
NERVOUS/ANXIOUS: 1
DIARRHEA: 0
ABDOMINAL PAIN: 0
FEVER: 0
MYALGIAS: 1
ARTHRALGIAS: 1
CHILLS: 0
HEARTBURN: 0
SORE THROAT: 0
JOINT SWELLING: 1
EYE PAIN: 0
CONSTIPATION: 0
HEADACHES: 1
DIZZINESS: 1
PALPITATIONS: 0
COUGH: 0

## 2023-10-31 ASSESSMENT — PATIENT HEALTH QUESTIONNAIRE - PHQ9
SUM OF ALL RESPONSES TO PHQ QUESTIONS 1-9: 5
SUM OF ALL RESPONSES TO PHQ QUESTIONS 1-9: 5
10. IF YOU CHECKED OFF ANY PROBLEMS, HOW DIFFICULT HAVE THESE PROBLEMS MADE IT FOR YOU TO DO YOUR WORK, TAKE CARE OF THINGS AT HOME, OR GET ALONG WITH OTHER PEOPLE: NOT DIFFICULT AT ALL

## 2023-10-31 ASSESSMENT — ACTIVITIES OF DAILY LIVING (ADL): CURRENT_FUNCTION: NO ASSISTANCE NEEDED

## 2023-10-31 NOTE — PROGRESS NOTES
"SUBJECTIVE:   Nannette is a 68 year old who presents for Preventive Visit.      10/31/2023     7:46 AM   Additional Questions   Roomed by Annie DELGADO         10/31/2023     7:46 AM   Patient Reported Additional Medications   Patient reports taking the following new medications None   History of a subarachnoid hemorrhage. Stable. Chronic ha's since. Followed by neuro.  Recheck of her RA. Stable. Followed by rheumatology.   Recheck of htn . No chest pain/sob/palpitations.   Home numbers have shown good control.     Are you in the first 12 months of your Medicare coverage?  No    Healthy Habits:     In general, how would you rate your overall health?  Good    Duration of exercise:  15-30 minutes    Do you usually eat at least 4 servings of fruit and vegetables a day, include whole grains    & fiber and avoid regularly eating high fat or \"junk\" foods?  Yes    Taking medications regularly:  Yes    Medication side effects:  None    Ability to successfully perform activities of daily living:  No assistance needed    Home Safety:  No safety concerns identified    Hearing Impairment:  No hearing concerns    In the past 6 months, have you been bothered by leaking of urine? Yes    In general, how would you rate your overall mental or emotional health?  Good    Additional concerns today:  No      Today's PHQ-9 Score:       10/31/2023     7:28 AM   PHQ-9 SCORE   PHQ-9 Total Score MyChart 5 (Mild depression)   PHQ-9 Total Score 5           Have you ever done Advance Care Planning? (For example, a Health Directive, POLST, or a discussion with a medical provider or your loved ones about your wishes):        Fall risk  Fallen 2 or more times in the past year?: No  Any fall with injury in the past year?: No    Cognitive Screening   1) Repeat 3 items (Leader, Season, Table)    2) Clock draw: ABNORMAL    3) 3 item recall: Recalls 1 object   Results: ABNORMAL clock, 1-2 items recalled: PROBABLE COGNITIVE IMPAIRMENT, **INFORM " PROVIDER**    Mini-Cog Copyright ISMAEL Crawford. Licensed by the author for use in [facility]; reprinted with permission (sodara@.[email or web address]). All rights reserved.      Do you have sleep apnea, excessive snoring or daytime drowsiness? : no    Reviewed and updated as needed this visit by clinical staff   Tobacco  Allergies  Meds              Reviewed and updated as needed this visit by Provider                 Social History     Tobacco Use    Smoking status: Former     Types: Cigarettes     Quit date: 2006     Years since quittin.9    Smokeless tobacco: Never   Substance Use Topics    Alcohol use: No             10/31/2023     7:34 AM   Alcohol Use   Prescreen: >3 drinks/day or >7 drinks/week? No     Do you have a current opioid prescription? No  Do you use any other controlled substances or medications that are not prescribed by a provider? None            Current providers sharing in care for this patient include:   Patient Care Team:  Leonela Ibanez PA-C as PCP - General (Physician Assistant)  Leonela Ibanez PA-C as Assigned PCP    The following health maintenance items are reviewed in Epic and correct as of today:  Health Maintenance   Topic Date Due    ZOSTER IMMUNIZATION (1 of 2) Never done    RSV VACCINE 60+ (1 - 1-dose 60+ series) Never done    MAMMO SCREENING  2023    INFLUENZA VACCINE (1) 2023    COVID-19 Vaccine ( season) 2023    MEDICARE ANNUAL WELLNESS VISIT  2023    CMP  2023    LIPID  2023    MICROALBUMIN  2023    PHQ-9  2024    ANNUAL REVIEW OF HM ORDERS  10/31/2024    FALL RISK ASSESSMENT  10/31/2024    DEXA  06/15/2025    ADVANCE CARE PLANNING  2027    COLORECTAL CANCER SCREENING  2027    DTAP/TDAP/TD IMMUNIZATION (3 - Td or Tdap) 2030    HEPATITIS C SCREENING  Completed    Pneumococcal Vaccine: 65+ Years  Completed    IPV IMMUNIZATION  Aged Out    HPV IMMUNIZATION  Aged Out    MENINGITIS  IMMUNIZATION  Aged Out    PAP  Discontinued     Lab work is in process  Labs reviewed in EPIC  BP Readings from Last 3 Encounters:   10/31/23 126/85   22 136/84   22 116/70    Wt Readings from Last 3 Encounters:   10/31/23 80.3 kg (177 lb)   22 86.1 kg (189 lb 12.8 oz)   22 87.2 kg (192 lb 3.2 oz)                  Patient Active Problem List   Diagnosis    Obesity    Advanced directives, counseling/discussion    Hyperlipidemia LDL goal <130    Hypertension, goal below 140/90    Anxiety and depression    Rheumatoid arthritis with positive rheumatoid factor, involving unspecified site (H)    Cerebral aneurysm    Osteopenia of both hips    Subarachnoid hemorrhage following injury with open intracranial wound, with no loss of consciousness, subsequent encounter     Past Surgical History:   Procedure Laterality Date    COLONOSCOPY WITH CO2 INSUFFLATION N/A 2017    Procedure: COLONOSCOPY WITH CO2 INSUFFLATION;  COLON SCREEN/ KNAEBLE;  Surgeon: Romeo Almonte MD;  Location: [facility] OR    D & C      polyps    SURGICAL HISTORY OF -   2008    aneurysm repair       Social History     Tobacco Use    Smoking status: Former     Types: Cigarettes     Quit date: 2006     Years since quittin.9    Smokeless tobacco: Never   Substance Use Topics    Alcohol use: No     Family History   Problem Relation Age of Onset    Cancer Mother         non hodgkins lymphoma    C.A.D. Father     Hypertension Father     Cerebrovascular Disease Father     Hypertension Brother     Cancer Daughter         lymphoma    Bleeding Disorder No family hx of     Anesthesia Reaction No family hx of          Current Outpatient Medications   Medication Sig Dispense Refill    adalimumab (HUMIRA) 40 MG/0.8ML prefilled syringe kit Inject 40 mg Subcutaneous      ASPIRIN ADULT LOW STRENGTH PO       CALCIUM-VITAMIN D PO       gabapentin (NEURONTIN) 100 MG capsule Take 1 capsule (100 mg) by mouth 2 times daily 120 capsule 2     leucovorin (WELLCOVORIN) 5 MG tablet Take 5 mg by mouth daily       losartan (COZAAR) 50 MG tablet Take 1 tablet (50 mg) by mouth daily 90 tablet 3    methotrexate 2.5 MG tablet CHEMO Take by mouth every 7 days 7 tabs      pravastatin (PRAVACHOL) 40 MG tablet Take 1 tablet (40 mg) by mouth daily 90 tablet 3    alendronate (FOSAMAX) 70 MG tablet Take 70 mg by mouth        No Known Allergies  Recent Labs   Lab Test 11/02/22  0937 08/18/21  1549 06/18/21  0739 11/27/20  0909 10/30/19  0957   A1C  --  5.4  --   --   --    *  --  109* 135*  --    HDL 65  --  73 57  --    TRIG 90  --  68 158*  --    ALT 29  --  25 30  --    CR 0.66  --  0.64 0.71  --    GFRESTIMATED >90  --  >90 >90  --    GFRESTBLACK  --   --  >90 >90  --    POTASSIUM 4.8  --  4.3 4.9  --    TSH  --   --   --   --  1.52      Mammogram Screening: Mammogram Screening: Recommended mammography every 1-2 years with patient discussion and risk factor consideration        11/2/2022     8:35 AM   Breast CA Risk Assessment (FHS-7)   Do you have a family history of breast, colon, or ovarian cancer? No / Unknown       click delete button to remove this line now  Mammogram Screening: Recommended mammography every 1-2 years with patient discussion and risk factor consideration  Pertinent mammograms are reviewed under the imaging tab.    Review of Systems   Constitutional:  Negative for chills and fever.   HENT:  Positive for hearing loss. Negative for congestion, ear pain and sore throat.    Eyes:  Negative for pain and visual disturbance.   Respiratory:  Negative for cough.    Cardiovascular:  Negative for chest pain, palpitations and peripheral edema.   Gastrointestinal:  Negative for abdominal pain, constipation, diarrhea, heartburn, hematochezia and nausea.   Breasts:  Negative for tenderness, breast mass and discharge.   Genitourinary:  Negative for dysuria, frequency, genital sores, hematuria, pelvic pain, vaginal bleeding and vaginal discharge.  "  Musculoskeletal:  Positive for arthralgias, joint swelling and myalgias.   Skin:  Negative for rash.   Neurological:  Positive for dizziness and headaches.   Psychiatric/Behavioral:  Positive for mood changes. The patient is nervous/anxious.      Constitutional, HEENT, cardiovascular, pulmonary, GI, , musculoskeletal, neuro, skin, endocrine and psych systems are negative, except as otherwise noted.    OBJECTIVE:   /85   Pulse 73   Temp 97.3  F (36.3  C) (Tympanic)   Resp 16   Ht 1.68 m (5' 6.14\")   Wt 80.3 kg (177 lb)   SpO2 96%   BMI 28.45 kg/m   Estimated body mass index is 28.45 kg/m  as calculated from the following:    Height as of this encounter: 1.68 m (5' 6.14\").    Weight as of this encounter: 80.3 kg (177 lb).  Physical Exam  GENERAL APPEARANCE: healthy, alert and no distress  EYES: Eyes grossly normal to inspection, PERRL and conjunctivae and sclerae normal  HENT: ear canals and TM's normal, nose and mouth without ulcers or lesions, oropharynx clear and oral mucous membranes moist  NECK: no adenopathy, no asymmetry, masses, or scars and thyroid normal to palpation  RESP: lungs clear to auscultation - no rales, rhonchi or wheezes  BREAST: normal without masses, tenderness or nipple discharge and no palpable axillary masses or adenopathy  CV: regular rate and rhythm, normal S1 S2, no S3 or S4, no murmur, click or rub, no peripheral edema and peripheral pulses strong  ABDOMEN: soft, nontender, no hepatosplenomegaly, no masses and bowel sounds normal  MS: no musculoskeletal defects are noted and gait is age appropriate without ataxia  SKIN: no suspicious lesions or rashes  NEURO: Normal strength and tone, sensory exam grossly normal, mentation intact and speech normal  PSYCH: mentation appears normal and affect normal/bright  Ak lesion on left cheek. Tx'd with 2 ftc's of cryotherapy.  Diagnostic Test Results:  Labs reviewed in Epic    ASSESSMENT / PLAN:       ICD-10-CM    1. Encounter for " "annual wellness exam in Medicare patient  Z00.00       2. Visit for screening mammogram  Z12.31 MA SCREENING DIGITAL BILAT - Future  (s+30)      3. Hypertension, goal below 140/90  I10 COMPREHENSIVE METABOLIC PANEL     Albumin Random Urine Quantitative with Creat Ratio     losartan (COZAAR) 50 MG tablet      4. Hyperlipidemia LDL goal <130  E78.5 Lipid panel reflex to direct LDL Non-fasting     pravastatin (PRAVACHOL) 40 MG tablet      5. Neuropathy  G62.9 gabapentin (NEURONTIN) 100 MG capsule      6. Subarachnoid hemorrhage following injury with open intracranial wound, with no loss of consciousness, subsequent encounter  S06.6X0D     S01.90XD       7. Rheumatoid arthritis with positive rheumatoid factor, involving unspecified site (H)  M05.9       8. Actinic keratosis  L57.0 DESTRUCT BENIGN LESION, UP TO 14        Advised supportive and symptomatic treatment.  Follow up with Provider - if condition persists or worsens.   work on lifestyle modification  Continue all meds   Patient has been advised of split billing requirements and indicates understanding: Yes      COUNSELING:  Reviewed preventive health counseling, as reflected in patient instructions       Regular exercise       Healthy diet/nutrition      BMI:   Estimated body mass index is 28.45 kg/m  as calculated from the following:    Height as of this encounter: 1.68 m (5' 6.14\").    Weight as of this encounter: 80.3 kg (177 lb).   Weight management plan: Discussed healthy diet and exercise guidelines      She reports that she quit smoking about 16 years ago. Her smoking use included cigarettes. She has never used smokeless tobacco.      Appropriate preventive services were discussed with this patient, including applicable screening as appropriate for fall prevention, nutrition, physical activity, Tobacco-use cessation, weight loss and cognition.  Checklist reviewing preventive services available has been given to the patient.    Reviewed patients plan of " care and provided an AVS. The Basic Care Plan (routine screening as documented in Health Maintenance) for Erica meets the Care Plan requirement. This Care Plan has been established and reviewed with the Patient.          Kirk Adams PA-C  [facility]    Identified Health Risks:  I have reviewed Opioid Use Disorder and Substance Use Disorder risk factors and made any needed referrals.

## 2023-10-31 NOTE — TELEPHONE ENCOUNTER
Patient Quality Outreach    Patient is due for the following:   Breast Cancer Screening - Mammogram    Next Steps:   No follow up needed at this time. Information was given to patient    Type of outreach:    Phone, spoke to patient/parent. patient      Questions for provider review:    None           Kristopher Barton CMA  Chart routed to Provider.

## 2023-11-01 DIAGNOSIS — F32.A ANXIETY AND DEPRESSION: ICD-10-CM

## 2023-11-01 DIAGNOSIS — F41.9 ANXIETY AND DEPRESSION: ICD-10-CM

## 2023-11-01 NOTE — TELEPHONE ENCOUNTER
Patient called and stated that she accidentally had MA delete that she was taking Prozac.    Patient stated that she is currently taking Prozac 40 mg, take 2 capsules to =80 mg daily, and she does need this refilled.

## 2023-11-02 RX ORDER — FLUOXETINE 40 MG/1
80 CAPSULE ORAL DAILY
Qty: 180 CAPSULE | Refills: 0 | Status: SHIPPED | OUTPATIENT
Start: 2023-11-02 | End: 2024-01-30

## 2023-12-02 ENCOUNTER — HEALTH MAINTENANCE LETTER (OUTPATIENT)
Age: 68
End: 2023-12-02

## 2023-12-15 DIAGNOSIS — G62.9 NEUROPATHY: ICD-10-CM

## 2023-12-15 RX ORDER — GABAPENTIN 100 MG/1
100 CAPSULE ORAL 2 TIMES DAILY
Qty: 120 CAPSULE | Refills: 2 | OUTPATIENT
Start: 2023-12-15

## 2023-12-15 NOTE — TELEPHONE ENCOUNTER
Patient called and stated that her Gabapentin was sent to [facility], and she need it to go to [facility],    Patient has seen Kirk Adams PA-C.    Routed to provider.    Makenzie MURPHY RN  Triage Nurse  [facility]

## 2023-12-15 NOTE — TELEPHONE ENCOUNTER
Medication refill has been refused. NEEDS IN PERSON APPT.   Schedule an appointment when patient returns call to clinic.    RN may refill enough medication until appt.

## 2024-02-14 NOTE — LETTER
My Depression Action Plan  Name: Erica Ramos   Date of Birth 1955  Date: 8/22/2018    My doctor: Leonela Ibanez   My clinic: [facility]  [address]  [place] [id number] [phone]          GREEN    ZONE   Good Control    What it looks like:     Things are going generally well. You have normal up s and down s. You may even feel depressed from time to time, but bad moods usually last less than a day.   What you need to do:  1. Continue to care for yourself (see self care plan)  2. Check your depression survival kit and update it as needed  3. Follow your physician s recommendations including any medication.  4. Do not stop taking medication unless you consult with your physician first.           YELLOW         ZONE Getting Worse    What it looks like:     Depression is starting to interfere with your life.     It may be hard to get out of bed; you may be starting to isolate yourself from others.    Symptoms of depression are starting to last most all day and this has happened for several days.     You may have suicidal thoughts but they are not constant.   What you need to do:     1. Call your care team, your response to treatment will improve if you keep your care team informed of your progress. Yellow periods are signs an adjustment may need to be made.     2. Continue your self-care, even if you have to fake it!    3. Talk to someone in your support network    4. Open up your depression survival kit           RED    ZONE Medical Alert - Get Help    What it looks like:     Depression is seriously interfering with your life.     You may experience these or other symptoms: You can t get out of bed most days, can t work or engage in other necessary activities, you have trouble taking care of basic hygiene, or basic responsibilities, thoughts of suicide or death that will not go away, self-injurious behavior.     What you need to do:  1. Call your care team  Lab test results from 1/29/2024 were reviewed - all normal, besides moderate hyperlipidemia (, ).  Call patient to inform.  Advise low fat, low cholesterol diet.  Will repeat all routine labs in 1 year.   and request a same-day appointment. If they are not available (weekends or after hours) call your local crisis line, emergency room or 911.            Depression Self Care Plan / Survival Kit    Self-Care for Depression  Here s the deal. Your body and mind are really not as separate as most people think.  What you do and think affects how you feel and how you feel influences what you do and think. This means if you do things that people who feel good do, it will help you feel better.  Sometimes this is all it takes.  There is also a place for medication and therapy depending on how severe your depression is, so be sure to consult with your medical provider and/ or Behavioral Health Consultant if your symptoms are worsening or not improving.     In order to better manage my stress, I will:    Exercise  Get some form of exercise, every day. This will help reduce pain and release endorphins, the  feel good  chemicals in your brain. This is almost as good as taking antidepressants!  This is not the same as joining a gym and then never going! (they count on that by the way ) It can be as simple as just going for a walk or doing some gardening, anything that will get you moving.      Hygiene   Maintain good hygiene (Get out of bed in the morning, Make your bed, Brush your teeth, Take a shower, and Get dressed like you were going to work, even if you are unemployed).  If your clothes don't fit try to get ones that do.    Diet  I will strive to eat foods that are good for me, drink plenty of water, and avoid excessive sugar, caffeine, alcohol, and other mood-altering substances.  Some foods that are helpful in depression are: complex carbohydrates, B vitamins, flaxseed, fish or fish oil, fresh fruits and vegetables.    Psychotherapy  I agree to participate in Individual Therapy (if recommended).    Medication  If prescribed medications, I agree to take them.  Missing doses can result in serious side effects.  I understand  that drinking alcohol, or other illicit drug use, may cause potential side effects.  I will not stop my medication abruptly without first discussing it with my provider.    Staying Connected With Others  I will stay in touch with my friends, family members, and my primary care provider/team.    Use your imagination  Be creative.  We all have a creative side; it doesn t matter if it s oil painting, sand castles, or mud pies! This will also kick up the endorphins.    Witness Beauty  (AKA stop and smell the roses) Take a look outside, even in mid-winter. Notice colors, textures. Watch the squirrels and birds.     Service to others  Be of service to others.  There is always someone else in need.  By helping others we can  get out of ourselves  and remember the really important things.  This also provides opportunities for practicing all the other parts of the program.    Humor  Laugh and be silly!  Adjust your TV habits for less news and crime-drama and more comedy.    Control your stress  Try breathing deep, massage therapy, biofeedback, and meditation. Find time to relax each day.     My support system    Clinic Contact:  Phone number:    Contact 1:  Phone number:    Contact 2:  Phone number:    [place]/:  Phone number:    Therapist:  Phone number:    Local crisis center:    Phone number:    Other community support:  Phone number:

## 2024-05-23 ENCOUNTER — ANCILLARY PROCEDURE (OUTPATIENT)
Dept: GENERAL RADIOLOGY | Facility: CLINIC | Age: 69
End: 2024-05-23
Attending: PHYSICIAN ASSISTANT
Payer: MEDICARE

## 2024-05-23 ENCOUNTER — OFFICE VISIT (OUTPATIENT)
Dept: URGENT CARE | Facility: URGENT CARE | Age: 69
End: 2024-05-23
Payer: MEDICARE

## 2024-05-23 ENCOUNTER — NURSE TRIAGE (OUTPATIENT)
Dept: FAMILY MEDICINE | Facility: CLINIC | Age: 69
End: 2024-05-23
Payer: MEDICARE

## 2024-05-23 VITALS
OXYGEN SATURATION: 96 % | RESPIRATION RATE: 16 BRPM | WEIGHT: 179.4 LBS | BODY MASS INDEX: 28.83 KG/M2 | HEART RATE: 82 BPM | DIASTOLIC BLOOD PRESSURE: 79 MMHG | SYSTOLIC BLOOD PRESSURE: 133 MMHG | TEMPERATURE: 98.8 F

## 2024-05-23 DIAGNOSIS — R10.31 RIGHT INGUINAL PAIN: Primary | ICD-10-CM

## 2024-05-23 DIAGNOSIS — R10.31 RIGHT INGUINAL PAIN: ICD-10-CM

## 2024-05-23 PROCEDURE — 73502 X-RAY EXAM HIP UNI 2-3 VIEWS: CPT | Mod: TC | Performed by: RADIOLOGY

## 2024-05-23 PROCEDURE — 99214 OFFICE O/P EST MOD 30 MIN: CPT | Performed by: PHYSICIAN ASSISTANT

## 2024-05-23 ASSESSMENT — ENCOUNTER SYMPTOMS
ARTHRALGIAS: 0
DIZZINESS: 0
PALPITATIONS: 0
ENDOCRINE NEGATIVE: 1
BACK PAIN: 0
COUGH: 0
WOUND: 0
ALLERGIC/IMMUNOLOGIC NEGATIVE: 1
FEVER: 0
RESPIRATORY NEGATIVE: 1
RHINORRHEA: 0
VOMITING: 0
DIARRHEA: 0
MUSCULOSKELETAL NEGATIVE: 1
MYALGIAS: 0
ABDOMINAL PAIN: 1
JOINT SWELLING: 0
HEADACHES: 0
LIGHT-HEADEDNESS: 0
WEAKNESS: 0
SHORTNESS OF BREATH: 0
EYES NEGATIVE: 1
CARDIOVASCULAR NEGATIVE: 1
CHILLS: 0
NAUSEA: 0
SORE THROAT: 0
NECK STIFFNESS: 0
NECK PAIN: 0

## 2024-05-23 NOTE — TELEPHONE ENCOUNTER
Called patient, and advised her of the message below per PCP.  No openings in clinic today, for an in person OV.  She stated that she will go to .    Patient stated understanding and agreeable with the plan of care.     Makenzie MURPHY RN  Triage Nurse  [facility]

## 2024-05-23 NOTE — PROGRESS NOTES
Chief Complaint:    Chief Complaint   Patient presents with    Urgent Care    Abdominal Pain     Lower right side abdomen pain radiating down leg (hx hernia)     Medical Decision Making:    Vital signs reviewed by Kelvin Swain PA-C  /79 (BP Location: Left arm, Patient Position: Sitting, Cuff Size: Adult Regular)   Pulse 82   Temp 98.8  F (37.1  C) (Oral)   Resp 16   Wt 81.4 kg (179 lb 6.4 oz)   SpO2 96%   BMI 28.83 kg/m      Differential Diagnosis:  Hernia, arthritis of hip, groin strain     ASSESSMENT:     1. Right inguinal pain       PLAN:     Patient is in no acute distress.    No indication of abdominal hernia.  XR of the R hip shows degenerative changes with no acute fracture per my read.    Order placed for follow up with ortho for further evaluation.    Worrisome symptoms discussed with instructions to go to the ED.  Patient verbalized understanding and agreed with this plan.    Labs:     Results for orders placed or performed in visit on 05/23/24   XR Hip Right 2-3 Views     Status: None    Narrative    EXAM: XR HIP RIGHT 2-3 VIEWS  LOCATION: [facility]  DATE: 5/23/2024    INDICATION: ongoing groin pain  COMPARISON: None.      Impression    IMPRESSION: The right hip is negative for fracture or dislocation. The visualized portion of the pelvis is negative for fracture. Mild degenerative change at the hip joint.       Current Meds:    Current Outpatient Medications:     adalimumab (HUMIRA) 40 MG/0.8ML prefilled syringe kit, Inject 40 mg Subcutaneous, Disp: , Rfl:     ASPIRIN ADULT LOW STRENGTH PO, , Disp: , Rfl:     CALCIUM-VITAMIN D PO, , Disp: , Rfl:     FLUoxetine (PROZAC) 40 MG capsule, TAKE TWO CAPSULES BY MOUTH EVERY DAY, Disp: 60 capsule, Rfl: 0    FOLIC ACID PO, Take 1 mg by mouth daily, Disp: , Rfl:     gabapentin (NEURONTIN) 100 MG capsule, Take 1 capsule (100 mg) by mouth 2 times daily, Disp: 120 capsule, Rfl: 2    leucovorin (WELLCOVORIN) 5 MG tablet,  Take 5 mg by mouth daily , Disp: , Rfl:     losartan (COZAAR) 50 MG tablet, Take 1 tablet (50 mg) by mouth daily, Disp: 90 tablet, Rfl: 3    methotrexate 2.5 MG tablet CHEMO, Take by mouth every 7 days 7 tabs, Disp: , Rfl:     pravastatin (PRAVACHOL) 40 MG tablet, Take 1 tablet (40 mg) by mouth daily, Disp: 90 tablet, Rfl: 3    alendronate (FOSAMAX) 70 MG tablet, Take 70 mg by mouth , Disp: , Rfl:     Allergies:  No Known Allergies    SUBJECTIVE    HPI: Erica Ramos is an 68 year old female who presents for evaluation and treatment of abdominal pain.  Patient has had pain in the lower R abdomen for roughly 2 weeks.  Walking and going up and down stairs and sitting makes the pain worse.  Laying flat on her back takes the pain away.  She has a Hx of hernia and is concerned about this.  She denies any nausea, vomiting, diarrhea, or constipation.      ROS:      Review of Systems   Constitutional:  Negative for chills and fever.   HENT:  Negative for congestion, ear pain, rhinorrhea and sore throat.    Eyes: Negative.    Respiratory: Negative.  Negative for cough and shortness of breath.    Cardiovascular: Negative.  Negative for chest pain and palpitations.   Gastrointestinal:  Positive for abdominal pain. Negative for diarrhea, nausea and vomiting.   Endocrine: Negative.    Genitourinary: Negative.    Musculoskeletal: Negative.  Negative for arthralgias, back pain, joint swelling, myalgias, neck pain and neck stiffness.   Skin: Negative.  Negative for rash and wound.   Allergic/Immunologic: Negative.  Negative for immunocompromised state.   Neurological:  Negative for dizziness, weakness, light-headedness and headaches.        Family History   Family History   Problem Relation Age of Onset    Cancer Mother         non hodgkins lymphoma    C.A.D. Father     Hypertension Father     Cerebrovascular Disease Father     Hypertension Brother     Cancer Daughter         lymphoma    Bleeding Disorder No family hx of      Anesthesia Reaction No family hx of        Social History  Social History     Socioeconomic History    Marital status:      Spouse name: Not on file    Number of children: 3    Years of education: Not on file    Highest education level: Not on file   Occupational History     Employer: UNEMPLOYED     Employer: SELF   Tobacco Use    Smoking status: Former     Current packs/day: 0.00     Types: Cigarettes     Quit date: 2006     Years since quittin.4    Smokeless tobacco: Never   Vaping Use    Vaping status: Never Used   Substance and Sexual Activity    Alcohol use: No    Drug use: No    Sexual activity: Not Currently     Partners: Male   Other Topics Concern    Parent/sibling w/ CABG, MI or angioplasty before 65F 55M? Not Asked   Social History Narrative    Not on file     Social Determinants of Health     Financial Resource Strain: Low Risk  (10/31/2023)    Financial Resource Strain     Within the past 12 months, have you or your family members you live with been unable to get utilities (heat, electricity) when it was really needed?: No   Food Insecurity: Low Risk  (10/31/2023)    Food Insecurity     Within the past 12 months, did you worry that your food would run out before you got money to buy more?: No     Within the past 12 months, did the food you bought just not last and you didn t have money to get more?: No   Transportation Needs: Low Risk  (10/31/2023)    Transportation Needs     Within the past 12 months, has lack of transportation kept you from medical appointments, getting your medicines, non-medical meetings or appointments, work, or from getting things that you need?: No   Physical Activity: Not on file   Stress: Not on file   Social Connections: Not on file   Interpersonal Safety: Not on file   Housing Stability: Low Risk  (10/31/2023)    Housing Stability     Do you have housing? : Yes     Are you worried about losing your housing?: No        Surgical History:  Past Surgical History:    Procedure Laterality Date    COLONOSCOPY WITH CO2 INSUFFLATION N/A 12/19/2017    Procedure: COLONOSCOPY WITH CO2 INSUFFLATION;  COLON SCREEN/ KNAEBLE;  Surgeon: Romeo Almonte MD;  Location: [facility] OR    D & C      polyps    SURGICAL HISTORY OF -   08/2008    aneurysm repair        Problem List:  Patient Active Problem List   Diagnosis    Obesity    Advanced directives, counseling/discussion    Hyperlipidemia LDL goal <130    Hypertension, goal below 140/90    Anxiety and depression    Rheumatoid arthritis with positive rheumatoid factor, involving unspecified site (H)    Cerebral aneurysm    Osteopenia of both hips    Subarachnoid hemorrhage following injury with open intracranial wound, with no loss of consciousness, subsequent encounter           OBJECTIVE:     Vital signs noted and reviewed by Kelvin Swain PA-C  /79 (BP Location: Left arm, Patient Position: Sitting, Cuff Size: Adult Regular)   Pulse 82   Temp 98.8  F (37.1  C) (Oral)   Resp 16   Wt 81.4 kg (179 lb 6.4 oz)   SpO2 96%   BMI 28.83 kg/m       PEFR:    Physical Exam  Vitals and nursing note reviewed.   Constitutional:       General: She is not in acute distress.     Appearance: She is well-developed. She is not ill-appearing, toxic-appearing or diaphoretic.   HENT:      Head: Normocephalic and atraumatic.      Right Ear: Tympanic membrane and external ear normal. No drainage, swelling or tenderness. Tympanic membrane is not perforated, erythematous, retracted or bulging.      Left Ear: Tympanic membrane and external ear normal. No drainage, swelling or tenderness. Tympanic membrane is not perforated, erythematous, retracted or bulging.      Nose: No mucosal edema, congestion or rhinorrhea.      Right Sinus: No maxillary sinus tenderness or frontal sinus tenderness.      Left Sinus: No maxillary sinus tenderness or frontal sinus tenderness.      Mouth/Throat:      Pharynx: No pharyngeal swelling, oropharyngeal exudate, posterior  oropharyngeal erythema or uvula swelling.      Tonsils: No tonsillar abscesses.   Eyes:      Pupils: Pupils are equal, round, and reactive to light.   Neck:      Trachea: Trachea normal.   Cardiovascular:      Rate and Rhythm: Normal rate and regular rhythm.      Heart sounds: Normal heart sounds, S1 normal and S2 normal. No murmur heard.     No friction rub. No gallop.   Pulmonary:      Effort: Pulmonary effort is normal. No respiratory distress.      Breath sounds: Normal breath sounds. No decreased breath sounds, wheezing, rhonchi or rales.   Abdominal:      General: Bowel sounds are normal. There is no distension.      Palpations: Abdomen is soft. Abdomen is not rigid. There is no mass.      Tenderness: There is no abdominal tenderness. There is no right CVA tenderness, left CVA tenderness, guarding or rebound. Negative signs include Pineda's sign and McBurney's sign.       Musculoskeletal:      Cervical back: Full passive range of motion without pain, normal range of motion and neck supple.      Right hip: Tenderness and bony tenderness present. No crepitus. Normal range of motion. Normal strength.        Legs:       Comments: Groing pain with internal and external rotation.   Lymphadenopathy:      Cervical: No cervical adenopathy.   Skin:     General: Skin is warm and dry.   Neurological:      Mental Status: She is alert and oriented to person, place, and time.      Cranial Nerves: No cranial nerve deficit.      Deep Tendon Reflexes: Reflexes are normal and symmetric.   Psychiatric:         Behavior: Behavior normal. Behavior is cooperative.         Thought Content: Thought content normal.         Judgment: Judgment normal.             Kelvin Swain PA-C  5/23/2024, 4:16 PM

## 2024-05-23 NOTE — TELEPHONE ENCOUNTER
Pt calling stating that she is having abdominal pain. It hurts to go up stairs/down stairs/ and sit down. Walking also causes pain. Lower right abnormal pain. Pt rates her pain a 4 now. At the end of the day it is a 10. Pt states that she has been having this pain for 2 weeks. Pt states that when she lays down she feels better. Pt states that it is a consent aching pain. Pt states that she is not having any other symptoms. Pt states at night she takes gabapentin at night and that has been what is helping her sleep.     Per protocol routing to primary care to assist in scheduling pt an appointment today. Number on file has been verified with pt. Thanks    Reason for Disposition   MODERATE pain (e.g., interferes with normal activities that comes and goes (cramps) lasts > 24 hours  (Exception: Pain with Vomiting or Diarrhea - see that Protocol.)    Additional Information   Negative: Passed out (i.e., fainted, collapsed and was not responding)   Negative: Shock suspected (e.g., cold/pale/clammy skin, too weak to stand, low BP, rapid pulse)   Negative: Sounds like a life-threatening emergency to the triager   Negative: Followed an abdomen (stomach) injury   Negative: Chest pain   Negative: Abdominal pain and pregnant < 20 weeks   Negative: Abdominal pain and pregnant 20 or more weeks   Negative: Pain is mainly in upper abdomen (if needed ask: 'is it mainly above the belly button?')   Negative: Abdomen bloating or swelling are main symptoms   Negative: SEVERE abdominal pain (e.g., excruciating)   Negative: Vomiting red blood or black (coffee ground) material   Negative: Blood in bowel movements  (Exception: Blood on surface of BM with constipation.)   Negative: Black or tarry bowel movements  (Exception: Chronic-unchanged black-grey BMs AND is taking iron pills or Pepto-Bismol.)   Negative: MILD TO MODERATE constant pain lasting > 2 hours   Negative: Vomiting bile (green color)   Negative: Patient sounds very sick or weak  to the triager   Negative: Vomiting and abdomen looks much more swollen than usual   Negative: White of the eyes have turned yellow (i.e., jaundice)   Negative: Blood in urine (red, pink, or tea-colored)   Negative: Fever > 103 F (39.4 C)   Negative: Fever > 101 F (38.3 C) and over 60 years of age   Negative: Fever > 100.0 F (37.8 C) and has diabetes mellitus or a weak immune system (e.g., HIV positive, cancer chemotherapy, organ transplant, splenectomy, chronic steroids)   Negative: Fever > 100.0 F (37.8 C) and bedridden (e.g., CVA, chronic illness, recovering from surgery)   Negative: Pregnant or could be pregnant (i.e., missed last menstrual period)    Protocols used: Abdominal Pain - Female-A-[place]  Maritza Jason RN  [facility]

## 2024-05-30 DIAGNOSIS — F41.9 ANXIETY AND DEPRESSION: ICD-10-CM

## 2024-05-30 DIAGNOSIS — F32.A ANXIETY AND DEPRESSION: ICD-10-CM

## 2024-05-30 RX ORDER — FLUOXETINE 40 MG/1
80 CAPSULE ORAL DAILY
Qty: 120 CAPSULE | Refills: 0 | Status: SHIPPED | OUTPATIENT
Start: 2024-05-30 | End: 2024-08-13

## 2024-06-03 NOTE — PROGRESS NOTES
ASSESSMENT & PLAN    Nannette was seen today for pain.    Diagnoses and all orders for this visit:    Primary osteoarthritis of right hip    Lateral pain of right hip  -     Orthopedic  Referral          See Patient Instructions  Patient Instructions   Reviewed the right hip pain.  Sounds like primarily lateral source, we discussed gluteal tendon involvement, potential for lateral hip bursitis.  Additionally, x-rays demonstrated some underlying degenerative change of the hip, or osteoarthritis.  Good to hear of some of the improvement that you have had.  We discussed managing symptoms with over-the-counter medication, okay to continue with using that.  Activity modification reviewed, including changing approach to the stairs, also avoiding side-lying on the right, as well as use of pillow for sleep.  Discussed rehab approach; home exercises reviewed today.  If interested in referral to physical therapy, contact clinic.  Future considerations could include referral to physical therapy, also potential for steroid injection if symptoms persist.  Potential targets include greater trochanteric bursa, versus imaging guided hip joint injection.  Monitor for 3 to 4 weeks with home exercises to begin.  If improving, follow-up is open ended.    If you have any further questions for your physician or physician s care team you can contact them thru MyChart or by calling [phone].      [facility]    -----  Chief Complaint   Patient presents with    Right Hip - Pain       SUBJECTIVE  Erica Ramos is a/an 68 year old female who is seen as an Urgent Care referral for evaluation of right hip.     The patient is seen by themselves.    Onset: 2+ month(s) ago. Reports insidious onset without acute precipitating event.  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  Additional history:  Has noted some issues with long time on feet, or going up stairs.  Recalls was sleeping on right side, causing morning pain. Has now changed to sleep on left side, which has been beneficial.        REVIEW OF SYSTEMS:  Review of Systems    OBJECTIVE:  Ht 1.676 m (5' 6\")   Wt 81.2 kg (179 lb)   BMI 28.89 kg/m           Right hip exam      ROM:     Flexion min lateral pain , some pain to thigh      internal rotation with lateral pain      external rotation no change    Strength:      flexion        abduction        adduction   No change    Tender:      greater trochanter    Special Tests:      positive (+) FADIR       Log roll some lateral pain      RADIOLOGY:  Final results and radiologist's interpretation, available in the [facility] health record.  Images were reviewed with the patient in the office today.  My personal interpretation of the performed imaging: mild underlying right hip arthrosis, some spurring femoral head noted on ER view.        EXAM: XR HIP RIGHT 2-3 VIEWS  LOCATION: [facility]  DATE: 5/23/2024     INDICATION: ongoing groin pain  COMPARISON: None.                                                                      IMPRESSION: The right hip is negative for fracture or dislocation. The visualized portion of the pelvis is negative for fracture. Mild degenerative change at the hip joint.        Review of prior external note(s) from -   Review of the result(s) of each unique test - imaging  Independent interpretation of a test performed by " another physician/other qualified health care professional (not separately reported) - imaging

## 2024-06-04 ENCOUNTER — OFFICE VISIT (OUTPATIENT)
Dept: ORTHOPEDICS | Facility: CLINIC | Age: 69
End: 2024-06-04
Attending: PHYSICIAN ASSISTANT
Payer: MEDICARE

## 2024-06-04 VITALS — BODY MASS INDEX: 28.77 KG/M2 | HEIGHT: 66 IN | WEIGHT: 179 LBS

## 2024-06-04 DIAGNOSIS — M16.11 PRIMARY OSTEOARTHRITIS OF RIGHT HIP: Primary | ICD-10-CM

## 2024-06-04 DIAGNOSIS — M25.551 LATERAL PAIN OF RIGHT HIP: ICD-10-CM

## 2024-06-04 PROCEDURE — 99203 OFFICE O/P NEW LOW 30 MIN: CPT | Performed by: PEDIATRICS

## 2024-06-04 NOTE — PATIENT INSTRUCTIONS
Reviewed the right hip pain.  Sounds like primarily lateral source, we discussed gluteal tendon involvement, potential for lateral hip bursitis.  Additionally, x-rays demonstrated some underlying degenerative change of the hip, or osteoarthritis.  Good to hear of some of the improvement that you have had.  We discussed managing symptoms with over-the-counter medication, okay to continue with using that.  Activity modification reviewed, including changing approach to the stairs, also avoiding side-lying on the right, as well as use of pillow for sleep.  Discussed rehab approach; home exercises reviewed today.  If interested in referral to physical therapy, contact clinic.  Future considerations could include referral to physical therapy, also potential for steroid injection if symptoms persist.  Potential targets include greater trochanteric bursa, versus imaging guided hip joint injection.  Monitor for 3 to 4 weeks with home exercises to begin.  If improving, follow-up is open ended.    If you have any further questions for your physician or physician s care team you can contact them thru [employer] or by calling [phone].

## 2024-06-04 NOTE — LETTER
6/4/2024         RE: Erica Ramos  [address]  [place] [address]        Dear Colleague,    Thank you for referring your patient, Erica Ramos, to the [facility]. Please see a copy of my visit note below.    ASSESSMENT & PLAN    Nannette was seen today for pain.    Diagnoses and all orders for this visit:    Primary osteoarthritis of right hip    Lateral pain of right hip  -     Orthopedic  Referral          See Patient Instructions  Patient Instructions   Reviewed the right hip pain.  Sounds like primarily lateral source, we discussed gluteal tendon involvement, potential for lateral hip bursitis.  Additionally, x-rays demonstrated some underlying degenerative change of the hip, or osteoarthritis.  Good to hear of some of the improvement that you have had.  We discussed managing symptoms with over-the-counter medication, okay to continue with using that.  Activity modification reviewed, including changing approach to the stairs, also avoiding side-lying on the right, as well as use of pillow for sleep.  Discussed rehab approach; home exercises reviewed today.  If interested in referral to physical therapy, contact clinic.  Future considerations could include referral to physical therapy, also potential for steroid injection if symptoms persist.  Potential targets include greater trochanteric bursa, versus imaging guided hip joint injection.  Monitor for 3 to 4 weeks with home exercises to begin.  If improving, follow-up is open ended.    If you have any further questions for your physician or physician s care team you can contact them thru MyChart or by calling [phone].      Coy Villalta DO  [facility]    -----  Chief Complaint   Patient presents with     Right Hip - Pain       SUBJECTIVE  Erica Ramos is a/an 68 year old female who is seen as an Urgent Care referral for evaluation of  "right hip.     The patient is seen by themselves.    Onset: 2+ month(s) ago. Reports insidious onset without acute precipitating event.  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  Additional history:  Has noted some issues with long time on feet, or going up stairs.  Recalls was sleeping on right side, causing morning pain. Has now changed to sleep on left side, which has been beneficial.        REVIEW OF SYSTEMS:  Review of Systems    OBJECTIVE:  Ht 1.676 m (5' 6\")   Wt 81.2 kg (179 lb)   BMI 28.89 kg/m           Right hip exam      ROM:     Flexion min lateral pain , some pain to thigh      internal rotation with lateral pain      external rotation no change    Strength:      flexion        abduction        adduction   No change    Tender:      greater trochanter    Special Tests:      positive (+) FADIR       Log roll some lateral pain      RADIOLOGY:  Final results and radiologist's interpretation, available in the [facility] health record.  Images were reviewed with the patient in the office today.  My personal interpretation of the performed imaging: mild underlying right hip arthrosis, some spurring femoral head noted on ER view.        EXAM: XR HIP RIGHT 2-3 VIEWS  LOCATION: [facility]  DATE: 5/23/2024     INDICATION: ongoing groin pain  COMPARISON: None.                                                                      IMPRESSION: The right hip is negative for fracture or " dislocation. The visualized portion of the pelvis is negative for fracture. Mild degenerative change at the hip joint.        Review of prior external note(s) from -   Review of the result(s) of each unique test - imaging  Independent interpretation of a test performed by another physician/other qualified health care professional (not separately reported) - imaging             Again, thank you for allowing me to participate in the care of your patient.        Sincerely,        Coy Villalta, DO

## 2024-07-21 ASSESSMENT — ANXIETY QUESTIONNAIRES
7. FEELING AFRAID AS IF SOMETHING AWFUL MIGHT HAPPEN: NOT AT ALL
4. TROUBLE RELAXING: SEVERAL DAYS
8. IF YOU CHECKED OFF ANY PROBLEMS, HOW DIFFICULT HAVE THESE MADE IT FOR YOU TO DO YOUR WORK, TAKE CARE OF THINGS AT HOME, OR GET ALONG WITH OTHER PEOPLE?: SOMEWHAT DIFFICULT
1. FEELING NERVOUS, ANXIOUS, OR ON EDGE: MORE THAN HALF THE DAYS
2. NOT BEING ABLE TO STOP OR CONTROL WORRYING: SEVERAL DAYS
5. BEING SO RESTLESS THAT IT IS HARD TO SIT STILL: SEVERAL DAYS
7. FEELING AFRAID AS IF SOMETHING AWFUL MIGHT HAPPEN: NOT AT ALL
IF YOU CHECKED OFF ANY PROBLEMS ON THIS QUESTIONNAIRE, HOW DIFFICULT HAVE THESE PROBLEMS MADE IT FOR YOU TO DO YOUR WORK, TAKE CARE OF THINGS AT HOME, OR GET ALONG WITH OTHER PEOPLE: SOMEWHAT DIFFICULT
GAD7 TOTAL SCORE: 8
6. BECOMING EASILY ANNOYED OR IRRITABLE: MORE THAN HALF THE DAYS
GAD7 TOTAL SCORE: 8
GAD7 TOTAL SCORE: 8
3. WORRYING TOO MUCH ABOUT DIFFERENT THINGS: SEVERAL DAYS

## 2024-07-21 ASSESSMENT — PATIENT HEALTH QUESTIONNAIRE - PHQ9
10. IF YOU CHECKED OFF ANY PROBLEMS, HOW DIFFICULT HAVE THESE PROBLEMS MADE IT FOR YOU TO DO YOUR WORK, TAKE CARE OF THINGS AT HOME, OR GET ALONG WITH OTHER PEOPLE: SOMEWHAT DIFFICULT
SUM OF ALL RESPONSES TO PHQ QUESTIONS 1-9: 11
SUM OF ALL RESPONSES TO PHQ QUESTIONS 1-9: 11

## 2024-07-22 ENCOUNTER — ANCILLARY PROCEDURE (OUTPATIENT)
Dept: GENERAL RADIOLOGY | Facility: CLINIC | Age: 69
End: 2024-07-22
Attending: PHYSICIAN ASSISTANT
Payer: MEDICARE

## 2024-07-22 ENCOUNTER — TELEPHONE (OUTPATIENT)
Dept: FAMILY MEDICINE | Facility: CLINIC | Age: 69
End: 2024-07-22

## 2024-07-22 ENCOUNTER — OFFICE VISIT (OUTPATIENT)
Dept: FAMILY MEDICINE | Facility: CLINIC | Age: 69
End: 2024-07-22
Payer: MEDICARE

## 2024-07-22 VITALS
HEIGHT: 66 IN | DIASTOLIC BLOOD PRESSURE: 76 MMHG | BODY MASS INDEX: 28.8 KG/M2 | RESPIRATION RATE: 20 BRPM | HEART RATE: 86 BPM | TEMPERATURE: 98 F | WEIGHT: 179.2 LBS | OXYGEN SATURATION: 94 % | SYSTOLIC BLOOD PRESSURE: 124 MMHG

## 2024-07-22 DIAGNOSIS — S09.92XA INJURY OF NOSE, INITIAL ENCOUNTER: ICD-10-CM

## 2024-07-22 DIAGNOSIS — M79.645 PAIN OF FINGER OF LEFT HAND: ICD-10-CM

## 2024-07-22 DIAGNOSIS — N32.81 OAB (OVERACTIVE BLADDER): ICD-10-CM

## 2024-07-22 DIAGNOSIS — F32.A ANXIETY AND DEPRESSION: ICD-10-CM

## 2024-07-22 DIAGNOSIS — F41.9 ANXIETY AND DEPRESSION: ICD-10-CM

## 2024-07-22 DIAGNOSIS — M05.9 RHEUMATOID ARTHRITIS WITH POSITIVE RHEUMATOID FACTOR, INVOLVING UNSPECIFIED SITE (H): ICD-10-CM

## 2024-07-22 DIAGNOSIS — S09.92XA INJURY OF NOSE, INITIAL ENCOUNTER: Primary | ICD-10-CM

## 2024-07-22 PROCEDURE — 73140 X-RAY EXAM OF FINGER(S): CPT | Mod: TC | Performed by: RADIOLOGY

## 2024-07-22 PROCEDURE — 99214 OFFICE O/P EST MOD 30 MIN: CPT | Performed by: PHYSICIAN ASSISTANT

## 2024-07-22 PROCEDURE — 70160 X-RAY EXAM OF NASAL BONES: CPT | Mod: TC | Performed by: RADIOLOGY

## 2024-07-22 RX ORDER — ALPRAZOLAM 0.5 MG
0.5 TABLET ORAL
COMMUNITY

## 2024-07-22 RX ORDER — GABAPENTIN 300 MG/1
300 CAPSULE ORAL AT BEDTIME
COMMUNITY

## 2024-07-22 RX ORDER — BUSPIRONE HYDROCHLORIDE 5 MG/1
5-10 TABLET ORAL 2 TIMES DAILY
Qty: 60 TABLET | Refills: 0 | Status: SHIPPED | OUTPATIENT
Start: 2024-07-22 | End: 2024-08-22

## 2024-07-22 RX ORDER — SOLIFENACIN SUCCINATE 5 MG/1
5 TABLET, FILM COATED ORAL DAILY
Qty: 60 TABLET | Refills: 0 | Status: SHIPPED | OUTPATIENT
Start: 2024-07-22 | End: 2024-08-22

## 2024-07-22 ASSESSMENT — PAIN SCALES - GENERAL: PAINLEVEL: NO PAIN (0)

## 2024-07-22 NOTE — TELEPHONE ENCOUNTER
Please call patient with the following info:    Patient does have a hairline fracture of her finger.  Can she come back in so I can fit her with a finger splint?  No need to make an appointment for this, just bring her back to a room and I can grab a splint for her

## 2024-07-22 NOTE — TELEPHONE ENCOUNTER
I called and spoke to patient, she is unable to come back today (she has to be to work by 2:30 pm) but says she will come in first thing tomorrow AM.    I advised her Leonela is scheduled to be in at 7 am.    Patient will let  know she is here for a finger splint and to call back to Leonela's team to come bring her back when she arrives.    Per provider's note, no appointment needed.    Routed back to Leonela Ibanez as ZAKIYA.    Camille FAULKNER RN  [facility] Triage

## 2024-07-22 NOTE — PROGRESS NOTES
"  Assessment & Plan       ICD-10-CM    1. Injury of nose, initial encounter  S09.92XA XR Nasal Bones 3 Views      2. Pain of finger of left hand  M79.645 XR Finger Left G/E 2 Views      3. OAB (overactive bladder)  N32.81 solifenacin (VESICARE) 5 MG tablet      4. Anxiety and depression  F41.9 busPIRone (BUSPAR) 5 MG tablet    F32.A       5. Rheumatoid arthritis with positive rheumatoid factor, involving unspecified site (H)  M05.9            1,2) Stood up too fast, saw stars, and likely fell - she woke up on the floor bleeding from the bridge of her nose. X-rays in process.     3) Will try Vesicare 5mg daily.     4) Add Buspar 5-10mg BID. Follow up in 1 month to reassess.     5) Follows rheumo    Patient Instructions   Start Buspar 1 tab (5mg) twice daily x1 week. You can increase to 2 tabs twice daily after 1 week if needed.       BMI  Estimated body mass index is 28.92 kg/m  as calculated from the following:    Height as of this encounter: 1.676 m (5' 6\").    Weight as of this encounter: 81.3 kg (179 lb 3.2 oz).     Return in about 1 month (around 8/22/2024) for depression/anxiety follow up, a virtual visit (telephone or video).       Leonardo Brunson is a 68 year old, presenting for the following health issues:  Recheck Medication and MH Follow Up      7/22/2024     7:46 AM   Additional Questions   Roomed by Birgit IVAN MA   Accompanied by No one         7/22/2024     7:46 AM   Patient Reported Additional Medications   Patient reports taking the following new medications None     History of Present Illness       Mental Health Follow-up:  Patient presents to follow-up on Depression & Anxiety.Patient's depression since last visit has been:  No change  The patient is having other symptoms associated with depression.  Patient's anxiety since last visit has been:  No change  The patient is having other symptoms associated with anxiety.  Any significant life events: other  Patient is not feeling anxious or having panic " "attacks.  Patient has no concerns about alcohol or drug use.    Headaches:   Since the patient's last clinic visit, headaches are: worsened  The patient is getting headaches:  Some days  She is able to do normal daily activities when she has a migraine.  The patient is taking the following rescue/relief medications:  Tylenol   Patient states \"The relief is inconsistent\" from the rescue/relief medications.   The patient is taking the following medications to prevent migraines:  No medications to prevent migraines  In the past 4 weeks, the patient has gone to an Urgent Care or Emergency Room 0 times times due to headaches.    Reason for visit:  Recheck and personal  Symptom onset:  More than a month  Symptoms include:  ? depression  Symptom intensity:  Moderate  Symptom progression:  Staying the same  Had these symptoms before:  Yes  Has tried/received treatment for these symptoms:  Yes    She eats 0-1 servings of fruits and vegetables daily.She consumes 0 sweetened beverage(s) daily.She exercises with enough effort to increase her heart rate 60 or more minutes per day.  She exercises with enough effort to increase her heart rate 4 days per week.   She is taking medications regularly.           Review of Systems  Constitutional, neuro, ENT, endocrine, pulmonary, cardiac, gastrointestinal, genitourinary, musculoskeletal, integument and psychiatric systems are negative, except as otherwise noted.      Objective    /76   Pulse 86   Temp 98  F (36.7  C) (Temporal)   Resp 20   Ht 1.676 m (5' 6\")   Wt 81.3 kg (179 lb 3.2 oz)   SpO2 94%   BMI 28.92 kg/m    Body mass index is 28.92 kg/m .  Physical Exam   GENERAL: alert and no distress  EYES: Eyes grossly normal to inspection, PERRL and conjunctivae and sclerae normal  HENT: ear canals and TM's normal, nose and mouth without ulcers or lesions  NECK: no adenopathy, no asymmetry, masses, or scars  RESP: lungs clear to auscultation - no rales, rhonchi or " wheezes  CV: regular rate and rhythm, normal S1 S2, no S3 or S4, no murmur, click or rub, no peripheral edema  ABDOMEN: soft, nontender, no hepatosplenomegaly, no masses and bowel sounds normal  MS: no gross musculoskeletal defects noted, no edema  SKIN: ecchymosis below left eye, chin; abrasion to bridge of nose, no noticeable deviation   NEURO: Normal strength and tone, mentation intact and speech normal  PSYCH: mentation appears normal, affect normal/bright          Signed Electronically by: Leonela Ibanez PA-C

## 2024-07-22 NOTE — PATIENT INSTRUCTIONS
Start Buspar 1 tab (5mg) twice daily x1 week. You can increase to 2 tabs twice daily after 1 week if needed.

## 2024-07-25 DIAGNOSIS — F41.9 ANXIETY AND DEPRESSION: ICD-10-CM

## 2024-07-25 DIAGNOSIS — F32.A ANXIETY AND DEPRESSION: ICD-10-CM

## 2024-07-25 RX ORDER — FLUOXETINE 40 MG/1
80 CAPSULE ORAL DAILY
Qty: 120 CAPSULE | Refills: 0 | OUTPATIENT
Start: 2024-07-25

## 2024-08-12 DIAGNOSIS — F41.9 ANXIETY AND DEPRESSION: ICD-10-CM

## 2024-08-12 DIAGNOSIS — F32.A ANXIETY AND DEPRESSION: ICD-10-CM

## 2024-08-13 RX ORDER — FLUOXETINE 40 MG/1
80 CAPSULE ORAL DAILY
Qty: 60 CAPSULE | Refills: 0 | Status: SHIPPED | OUTPATIENT
Start: 2024-08-13 | End: 2024-08-22

## 2024-08-22 ENCOUNTER — VIRTUAL VISIT (OUTPATIENT)
Dept: FAMILY MEDICINE | Facility: CLINIC | Age: 69
End: 2024-08-22
Payer: MEDICARE

## 2024-08-22 DIAGNOSIS — F32.A ANXIETY AND DEPRESSION: ICD-10-CM

## 2024-08-22 DIAGNOSIS — N32.81 OAB (OVERACTIVE BLADDER): ICD-10-CM

## 2024-08-22 DIAGNOSIS — F41.9 ANXIETY AND DEPRESSION: ICD-10-CM

## 2024-08-22 PROCEDURE — 99441 PR PHYSICIAN TELEPHONE EVALUATION 5-10 MIN: CPT | Mod: 93 | Performed by: PHYSICIAN ASSISTANT

## 2024-08-22 RX ORDER — SOLIFENACIN SUCCINATE 5 MG/1
5 TABLET, FILM COATED ORAL DAILY
Qty: 90 TABLET | Refills: 0 | Status: SHIPPED | OUTPATIENT
Start: 2024-08-22

## 2024-08-22 RX ORDER — FLUOXETINE 40 MG/1
80 CAPSULE ORAL DAILY
Qty: 180 CAPSULE | Refills: 0 | Status: SHIPPED | OUTPATIENT
Start: 2024-08-22

## 2024-08-22 RX ORDER — BUSPIRONE HYDROCHLORIDE 10 MG/1
10 TABLET ORAL DAILY
Qty: 90 TABLET | Refills: 0 | Status: SHIPPED | OUTPATIENT
Start: 2024-08-22

## 2024-08-22 ASSESSMENT — ANXIETY QUESTIONNAIRES
6. BECOMING EASILY ANNOYED OR IRRITABLE: NOT AT ALL
7. FEELING AFRAID AS IF SOMETHING AWFUL MIGHT HAPPEN: NOT AT ALL
1. FEELING NERVOUS, ANXIOUS, OR ON EDGE: NOT AT ALL
GAD7 TOTAL SCORE: 1
IF YOU CHECKED OFF ANY PROBLEMS ON THIS QUESTIONNAIRE, HOW DIFFICULT HAVE THESE PROBLEMS MADE IT FOR YOU TO DO YOUR WORK, TAKE CARE OF THINGS AT HOME, OR GET ALONG WITH OTHER PEOPLE: NOT DIFFICULT AT ALL
2. NOT BEING ABLE TO STOP OR CONTROL WORRYING: NOT AT ALL
GAD7 TOTAL SCORE: 1
3. WORRYING TOO MUCH ABOUT DIFFERENT THINGS: SEVERAL DAYS
5. BEING SO RESTLESS THAT IT IS HARD TO SIT STILL: NOT AT ALL

## 2024-08-22 ASSESSMENT — PATIENT HEALTH QUESTIONNAIRE - PHQ9
5. POOR APPETITE OR OVEREATING: NOT AT ALL
SUM OF ALL RESPONSES TO PHQ QUESTIONS 1-9: 0

## 2024-08-22 NOTE — PROGRESS NOTES
"Nannette is a 69 year old who is being evaluated via a billable telephone visit.    What phone number would you like to be contacted at? [phone]  How would you like to obtain your AVS? Leslie  Originating Location (pt. Location): Home    Distant Location (provider location):  Off-site    Assessment & Plan       ICD-10-CM    1. Anxiety and depression  F41.9 busPIRone (BUSPAR) 10 MG tablet    F32.A FLUoxetine (PROZAC) 40 MG capsule      2. OAB (overactive bladder)  N32.81 solifenacin (VESICARE) 5 MG tablet          1) Continue Buspar 10mg in the morning and Prozac, no changes    2) Continue Vesicare 5mg daily, no changes    New meds are working well for her.      BMI  Estimated body mass index is 28.92 kg/m  as calculated from the following:    Height as of 7/22/24: 1.676 m (5' 6\").    Weight as of 7/22/24: 81.3 kg (179 lb 3.2 oz).     Return in about 3 months (around 11/22/2024) for your annual physical, a med check, fasting labs, with Leonela, in person.      Subjective   Nannette is a 69 year old, presenting for the following health issues:  Anxiety and Depression        8/22/2024     7:16 AM   Additional Questions   Roomed by Kristi over the phone   Accompanied by veda         8/22/2024     7:16 AM   Patient Reported Additional Medications   Patient reports taking the following new medications none     History of Present Illness       Mental Health Follow-up:  Patient presents to follow-up on Depression & Anxiety.Patient's depression since last visit has been:  Good  The patient is not having other symptoms associated with depression.  Patient's anxiety since last visit has been:  Good  The patient is not having other symptoms associated with anxiety.  Any significant life events: No  Patient is not feeling anxious or having panic attacks.  Patient has no concerns about alcohol or drug use.      Vesicare has been helpful   Would like to continue    Taking 10mg of Buspar in the morning  Feels as though it's been " helpful          Review of Systems  Constitutional, , and psychiatric systems are negative, except as otherwise noted.      Objective           Vitals:  No vitals were obtained today due to virtual visit.    Physical Exam   General: Alert and no distress //Respiratory: No audible wheeze, cough, or shortness of breath // Psychiatric:  Appropriate affect, tone, and pace of words          Phone call duration: 6 minutes  Signed Electronically by: Leonela Ibanez PA-C

## 2024-11-05 ENCOUNTER — OFFICE VISIT (OUTPATIENT)
Dept: FAMILY MEDICINE | Facility: CLINIC | Age: 69
End: 2024-11-05
Payer: MEDICARE

## 2024-11-05 VITALS
HEIGHT: 66 IN | OXYGEN SATURATION: 99 % | DIASTOLIC BLOOD PRESSURE: 72 MMHG | RESPIRATION RATE: 16 BRPM | SYSTOLIC BLOOD PRESSURE: 138 MMHG | BODY MASS INDEX: 27.64 KG/M2 | TEMPERATURE: 97.5 F | HEART RATE: 75 BPM | WEIGHT: 172 LBS

## 2024-11-05 DIAGNOSIS — I10 HYPERTENSION, GOAL BELOW 140/90: ICD-10-CM

## 2024-11-05 DIAGNOSIS — F41.9 ANXIETY AND DEPRESSION: ICD-10-CM

## 2024-11-05 DIAGNOSIS — Z12.31 VISIT FOR SCREENING MAMMOGRAM: ICD-10-CM

## 2024-11-05 DIAGNOSIS — F32.A ANXIETY AND DEPRESSION: ICD-10-CM

## 2024-11-05 DIAGNOSIS — I67.1 CEREBRAL ANEURYSM: ICD-10-CM

## 2024-11-05 DIAGNOSIS — Z00.00 ENCOUNTER FOR MEDICARE ANNUAL WELLNESS EXAM: Primary | ICD-10-CM

## 2024-11-05 DIAGNOSIS — E78.5 HYPERLIPIDEMIA LDL GOAL <130: ICD-10-CM

## 2024-11-05 DIAGNOSIS — Z23 NEED FOR SHINGLES VACCINE: ICD-10-CM

## 2024-11-05 LAB
ALBUMIN SERPL BCG-MCNC: 4.3 G/DL (ref 3.5–5.2)
ALP SERPL-CCNC: 34 U/L (ref 40–150)
ALT SERPL W P-5'-P-CCNC: 15 U/L (ref 0–50)
ANION GAP SERPL CALCULATED.3IONS-SCNC: 8 MMOL/L (ref 7–15)
AST SERPL W P-5'-P-CCNC: 26 U/L (ref 0–45)
BILIRUB SERPL-MCNC: 0.5 MG/DL
BUN SERPL-MCNC: 19.9 MG/DL (ref 8–23)
CALCIUM SERPL-MCNC: 9.4 MG/DL (ref 8.8–10.4)
CHLORIDE SERPL-SCNC: 105 MMOL/L (ref 98–107)
CHOLEST SERPL-MCNC: 208 MG/DL
CREAT SERPL-MCNC: 0.7 MG/DL (ref 0.51–0.95)
CREAT UR-MCNC: 102 MG/DL
EGFRCR SERPLBLD CKD-EPI 2021: >90 ML/MIN/1.73M2
FASTING STATUS PATIENT QL REPORTED: ABNORMAL
FASTING STATUS PATIENT QL REPORTED: ABNORMAL
GLUCOSE SERPL-MCNC: 87 MG/DL (ref 70–99)
HCO3 SERPL-SCNC: 26 MMOL/L (ref 22–29)
HDLC SERPL-MCNC: 58 MG/DL
LDLC SERPL CALC-MCNC: 129 MG/DL
MICROALBUMIN UR-MCNC: <12 MG/L
MICROALBUMIN/CREAT UR: NORMAL MG/G{CREAT}
NONHDLC SERPL-MCNC: 150 MG/DL
POTASSIUM SERPL-SCNC: 4.9 MMOL/L (ref 3.4–5.3)
PROT SERPL-MCNC: 7.5 G/DL (ref 6.4–8.3)
SODIUM SERPL-SCNC: 139 MMOL/L (ref 135–145)
TRIGL SERPL-MCNC: 106 MG/DL

## 2024-11-05 PROCEDURE — 82570 ASSAY OF URINE CREATININE: CPT | Performed by: PHYSICIAN ASSISTANT

## 2024-11-05 PROCEDURE — 90662 IIV NO PRSV INCREASED AG IM: CPT | Performed by: PHYSICIAN ASSISTANT

## 2024-11-05 PROCEDURE — G0439 PPPS, SUBSEQ VISIT: HCPCS | Performed by: PHYSICIAN ASSISTANT

## 2024-11-05 PROCEDURE — G0008 ADMIN INFLUENZA VIRUS VAC: HCPCS | Performed by: PHYSICIAN ASSISTANT

## 2024-11-05 PROCEDURE — 82043 UR ALBUMIN QUANTITATIVE: CPT | Performed by: PHYSICIAN ASSISTANT

## 2024-11-05 PROCEDURE — 80053 COMPREHEN METABOLIC PANEL: CPT | Performed by: PHYSICIAN ASSISTANT

## 2024-11-05 PROCEDURE — 80061 LIPID PANEL: CPT | Performed by: PHYSICIAN ASSISTANT

## 2024-11-05 PROCEDURE — 36415 COLL VENOUS BLD VENIPUNCTURE: CPT | Performed by: PHYSICIAN ASSISTANT

## 2024-11-05 PROCEDURE — 99214 OFFICE O/P EST MOD 30 MIN: CPT | Mod: 25 | Performed by: PHYSICIAN ASSISTANT

## 2024-11-05 RX ORDER — PRAVASTATIN SODIUM 40 MG
40 TABLET ORAL DAILY
Qty: 90 TABLET | Refills: 3 | Status: SHIPPED | OUTPATIENT
Start: 2024-11-05

## 2024-11-05 RX ORDER — FLUOXETINE 40 MG/1
80 CAPSULE ORAL DAILY
Qty: 180 CAPSULE | Refills: 3 | Status: SHIPPED | OUTPATIENT
Start: 2024-11-05

## 2024-11-05 RX ORDER — LOSARTAN POTASSIUM 50 MG/1
50 TABLET ORAL DAILY
Qty: 90 TABLET | Refills: 3 | Status: SHIPPED | OUTPATIENT
Start: 2024-11-05

## 2024-11-05 RX ORDER — LOSARTAN POTASSIUM 50 MG/1
50 TABLET ORAL DAILY
Qty: 90 TABLET | Refills: 2 | OUTPATIENT
Start: 2024-11-05

## 2024-11-05 SDOH — HEALTH STABILITY: PHYSICAL HEALTH: ON AVERAGE, HOW MANY DAYS PER WEEK DO YOU ENGAGE IN MODERATE TO STRENUOUS EXERCISE (LIKE A BRISK WALK)?: 0 DAYS

## 2024-11-05 ASSESSMENT — PATIENT HEALTH QUESTIONNAIRE - PHQ9
SUM OF ALL RESPONSES TO PHQ QUESTIONS 1-9: 1
SUM OF ALL RESPONSES TO PHQ QUESTIONS 1-9: 1
10. IF YOU CHECKED OFF ANY PROBLEMS, HOW DIFFICULT HAVE THESE PROBLEMS MADE IT FOR YOU TO DO YOUR WORK, TAKE CARE OF THINGS AT HOME, OR GET ALONG WITH OTHER PEOPLE: NOT DIFFICULT AT ALL

## 2024-11-05 ASSESSMENT — ANXIETY QUESTIONNAIRES
4. TROUBLE RELAXING: NOT AT ALL
IF YOU CHECKED OFF ANY PROBLEMS ON THIS QUESTIONNAIRE, HOW DIFFICULT HAVE THESE PROBLEMS MADE IT FOR YOU TO DO YOUR WORK, TAKE CARE OF THINGS AT HOME, OR GET ALONG WITH OTHER PEOPLE: NOT DIFFICULT AT ALL
GAD7 TOTAL SCORE: 3
7. FEELING AFRAID AS IF SOMETHING AWFUL MIGHT HAPPEN: NOT AT ALL
1. FEELING NERVOUS, ANXIOUS, OR ON EDGE: SEVERAL DAYS
2. NOT BEING ABLE TO STOP OR CONTROL WORRYING: NOT AT ALL
3. WORRYING TOO MUCH ABOUT DIFFERENT THINGS: SEVERAL DAYS
6. BECOMING EASILY ANNOYED OR IRRITABLE: SEVERAL DAYS
GAD7 TOTAL SCORE: 3
7. FEELING AFRAID AS IF SOMETHING AWFUL MIGHT HAPPEN: NOT AT ALL
GAD7 TOTAL SCORE: 3
8. IF YOU CHECKED OFF ANY PROBLEMS, HOW DIFFICULT HAVE THESE MADE IT FOR YOU TO DO YOUR WORK, TAKE CARE OF THINGS AT HOME, OR GET ALONG WITH OTHER PEOPLE?: NOT DIFFICULT AT ALL
5. BEING SO RESTLESS THAT IT IS HARD TO SIT STILL: NOT AT ALL

## 2024-11-05 ASSESSMENT — SOCIAL DETERMINANTS OF HEALTH (SDOH): HOW OFTEN DO YOU GET TOGETHER WITH FRIENDS OR RELATIVES?: ONCE A WEEK

## 2024-11-05 NOTE — PROGRESS NOTES
"Preventive Care Visit  [facility] Ibanez PA-C, Family Medicine  Nov 5, 2024      Assessment & Plan       ICD-10-CM    1. Encounter for Medicare annual wellness exam  Z00.00       2. Need for shingles vaccine  Z23 zoster vaccine recombinant adjuvanted (SHINGRIX) injection      3. Visit for screening mammogram  Z12.31 MA Screening Bilateral w/ [facility]      4. Hypertension, goal below 140/90  I10 COMPREHENSIVE METABOLIC PANEL     Albumin Random Urine Quantitative with Creat Ratio     losartan (COZAAR) 50 MG tablet      5. Hyperlipidemia LDL goal <130  E78.5 Lipid panel reflex to direct LDL Fasting     pravastatin (PRAVACHOL) 40 MG tablet      6. Anxiety and depression  F41.9 FLUoxetine (PROZAC) 40 MG capsule    F32.A       7. Cerebral aneurysm  I67.1           1-3) Screenings/preventative measures discussed    4,5) Blood pressure at goal. Routine labs in process. Meds renewed, no changes    6) Has stopped Buspar and is doing well. Continue Prozac, no changes.     7) Follows neurosurgery Q2 years.      BMI  Estimated body mass index is 27.76 kg/m  as calculated from the following:    Height as of this encounter: 1.676 m (5' 6\").    Weight as of this encounter: 78 kg (172 lb).     Counseling  Appropriate preventive services were addressed with this patient via screening, questionnaire, or discussion as appropriate for fall prevention, nutrition, physical activity, Tobacco-use cessation, social engagement, weight loss and cognition.  Checklist reviewing preventive services available has been given to the patient.  Reviewed patient's diet, addressing concerns and/or questions.     I was present with the student who participated in the service and in the documentation of the note. I have verified the history and personally performed the physical exam and medical decision making.   Leonela Ibanez PA-C    Return in about 1 year (around 11/5/2025) for your annual physical, a med check, fasting " labs, with Leonela, in person.       Leonardo Brunson is a 69 year old, presenting for the following:  Physical        2024     8:26 AM   Additional Questions   Roomed by Edita   Accompanied by Self         2024     8:26 AM   Patient Reported Additional Medications   Patient reports taking the following new medications NA           HPI    Patient with history of Depression, Anxiety, Hyperlipidemia and Hypertension arrived for Annual Physical.     Hyperlipidemia Follow-Up    Are you regularly taking any medication or supplement to lower your cholesterol?   Yes- Pravastatin   Are you having muscle aches or other side effects that you think could be caused by your cholesterol lowering medication?  No    Hypertension Follow-up    Do you check your blood pressure regularly outside of the clinic? Yes   Are you following a low salt diet? Yes  Are your blood pressures ever more than 140 on the top number (systolic) OR more   than 90 on the bottom number (diastolic), for example 140/90? No    Depression and Anxiety   How are you doing with your depression since your last visit? Improved   How are you doing with your anxiety since your last visit?  Improved   Are you having other symptoms that might be associated with depression or anxiety? No  Have you had a significant life event? OTHER: Daughter got     Do you have any concerns with your use of alcohol or other drugs? No    Social History     Tobacco Use    Smoking status: Former     Current packs/day: 0.00     Types: Cigarettes     Quit date: 2006     Years since quittin.9     Passive exposure: Past    Smokeless tobacco: Never   Vaping Use    Vaping status: Never Used   Substance Use Topics    Alcohol use: No    Drug use: No         2024     3:59 PM 2024     7:18 AM 2024     8:14 AM   PHQ   PHQ-9 Total Score 11 0 1    Q9: Thoughts of better off dead/self-harm past 2 weeks Not at all  Not at all Not at all        Patient-reported          7/21/2024     4:14 PM 8/22/2024     7:18 AM 11/5/2024     8:15 AM   FLORIAN-7 SCORE   Total Score 8 (mild anxiety)  3 (minimal anxiety)   Total Score 8 1 3        Patient-reported     Suicide Assessment Five-step Evaluation and Treatment (SAFE-T)      Health Care Directive  Patient does not have a Health Care Directive: Discussed advance care planning with patient; information given to patient to review.      11/5/2024   General Health   How would you rate your overall physical health? Good   Feel stress (tense, anxious, or unable to sleep) To some extent      (!) STRESS CONCERN      11/5/2024   Nutrition   Diet: Regular (no restrictions)            11/5/2024   Exercise   Days per week of moderate/strenous exercise 0 days      (!) EXERCISE CONCERN      11/5/2024   Social Factors   Frequency of gathering with friends or relatives Once a week   Worry food won't last until get money to buy more No   Food not last or not have enough money for food? No   Do you have housing? (Housing is defined as stable permanent housing and does not include staying ouside in a car, in a tent, in an abandoned building, in an overnight shelter, or couch-surfing.) Yes   Are you worried about losing your housing? No   Lack of transportation? No   Unable to get utilities (heat,electricity)? No            11/5/2024   Fall Risk   Fallen 2 or more times in the past year? No    Trouble with walking or balance? No        Patient-reported          11/5/2024   Activities of Daily Living- Home Safety   Needs help with the following daily activites None of the above   Safety concerns in the home None of the above            11/5/2024   Dental   Dentist two times every year? Yes            11/5/2024   Hearing Screening   Hearing concerns? None of the above            11/5/2024   Driving Risk Screening   Patient/family members have concerns about driving No            11/5/2024   General Alertness/Fatigue Screening   Have you been more tired than  usual lately? No            2024   Urinary Incontinence Screening   Bothered by leaking urine in past 6 months No            2024   TB Screening   Were you born outside of the [place]? Yes          Today's PHQ-9 Score:       2024     8:14 AM   PHQ-9 SCORE   PHQ-9 Total Score MyChart 1 (Minimal depression)   PHQ-9 Total Score 1        Patient-reported         2024   Substance Use   Alcohol more than 3/day or more than 7/wk No   Do you have a current opioid prescription? No   How severe/bad is pain from 1 to 10? 2/10   Do you use any other substances recreationally? (!) CANNABIS PRODUCTS        Social History     Tobacco Use    Smoking status: Former     Current packs/day: 0.00     Types: Cigarettes     Quit date: 2006     Years since quittin.9     Passive exposure: Past    Smokeless tobacco: Never   Vaping Use    Vaping status: Never Used   Substance Use Topics    Alcohol use: No    Drug use: No           2021   LAST FHS-7 RESULTS   1st degree relative breast or ovarian cancer No   Any relative bilateral breast cancer No   Any male have breast cancer No   Any ONE woman have BOTH breast AND ovarian cancer No   Any woman with breast cancer before 50yrs No   2 or more relatives with breast AND/OR ovarian cancer No   2 or more relatives with breast AND/OR bowel cancer No           Mammogram Screening - Mammogram every 1-2 years updated in Health Maintenance based on mutual decision making      History of abnormal Pap smear: No - age 65 or older with adequate negative prior screening test results (3 consecutive negative cytology results, 2 consecutive negative cotesting results, or 2 consecutive negative HrHPV test results within 10 years, with the most recent test occurring within the recommended screening interval for the test used)        Latest Ref Rng & Units 2019     9:50 AM 2019     9:45 AM 2016    12:00 AM   PAP / HPV   PAP (Historical)  NIL   NIL    HPV 16 DNA  NEG^Negative  Negative     HPV 18 DNA NEG^Negative  Negative     Other HR HPV NEG^Negative  Negative       ASCVD Risk   The 10-year ASCVD risk score (Margret SARABIA, et al., 2019) is: 10.9%    Values used to calculate the score:      Age: 69 years      Sex: Female      Is Non- : No      Diabetic: No      Tobacco smoker: No      Systolic Blood Pressure: 138 mmHg      Is BP treated: Yes      HDL Cholesterol: 65 mg/dL      Total Cholesterol: 185 mg/dL        Reviewed and updated as needed this visit by Provider                    Current providers sharing in care for this patient include:  Patient Care Team:  Leonela Ibanez PA-C as PCP - General (Physician Assistant)  Coy Villalta DO as Assigned Musculoskeletal Provider  Leonela Ibanez PA-C as Assigned PCP    The following health maintenance items are reviewed in Epic and correct as of today:  Health Maintenance   Topic Date Due    ZOSTER IMMUNIZATION (1 of 2) Never done    RSV VACCINE (1 - Risk 60-74 years 1-dose series) Never done    MAMMO SCREENING  07/30/2023    INFLUENZA VACCINE (1) 09/01/2024    COVID-19 Vaccine (4 - 2024-25 season) 09/01/2024    BMP  10/31/2024    CMP  10/31/2024    LIPID  10/31/2024    MICROALBUMIN  10/31/2024    ANNUAL REVIEW OF HM ORDERS  10/31/2024    MEDICARE ANNUAL WELLNESS VISIT  10/31/2024    PHQ-9  05/05/2025    DEXA  06/15/2025    FALL RISK ASSESSMENT  11/05/2025    GLUCOSE  10/31/2026    ADVANCE CARE PLANNING  12/08/2027    COLORECTAL CANCER SCREENING  12/19/2027    DTAP/TDAP/TD IMMUNIZATION (3 - Td or Tdap) 04/16/2030    HEPATITIS C SCREENING  Completed    PHQ-2 (once per calendar year)  Completed    Pneumococcal Vaccine: 65+ Years  Completed    HPV IMMUNIZATION  Aged Out    MENINGITIS IMMUNIZATION  Aged Out    RSV MONOCLONAL ANTIBODY  Aged Out    PAP  Discontinued         Review of Systems  Constitutional, neuro, ENT, endocrine, pulmonary, cardiac, gastrointestinal, genitourinary,  "musculoskeletal, integument and psychiatric systems are negative, except as otherwise noted.     Objective    Exam  /72 (BP Location: Right arm, Patient Position: Chair, Cuff Size: Adult Regular)   Pulse 75   Temp 97.5  F (36.4  C) (Tympanic)   Resp 16   Ht 1.676 m (5' 6\")   Wt 78 kg (172 lb)   SpO2 99%   BMI 27.76 kg/m     Estimated body mass index is 27.76 kg/m  as calculated from the following:    Height as of this encounter: 1.676 m (5' 6\").    Weight as of this encounter: 78 kg (172 lb).    Physical Exam  GENERAL: alert and no distress  EYES: Eyes grossly normal to inspection  HENT: ear canals and TM's normal, nose and mouth without ulcers or lesions  NECK: no adenopathy, no asymmetry, masses, or scars  RESP: lungs clear to auscultation - no rales, rhonchi or wheezes  CV: regular rates and rhythm, normal S1 S2, no S3 or S4, and no murmur, click or rub  ABDOMEN: soft, nontender, without hepatosplenomegaly or masses  MS: no gross musculoskeletal defects noted, no edema  SKIN: no suspicious lesions or rashes  NEURO: Normal strength and tone, mentation intact and speech normal  PSYCH: mentation appears normal, affect normal/bright         11/5/2024   Mini Cog   Clock Draw Score 2 Normal   3 Item Recall 2 objects recalled   Mini Cog Total Score 4            She corrected her abnormal clock after being told it was incorrect.  States this recall is likely related to her cerebral aneurysm and previous SAH       Signed Electronically by: Leonela Ibanez PA-C    Answers submitted by the patient for this visit:  Patient Health Questionnaire (Submitted on 11/5/2024)  If you checked off any problems, how difficult have these problems made it for you to do your work, take care of things at home, or get along with other people?: Not difficult at all  PHQ9 TOTAL SCORE: 1  Patient Health Questionnaire (G7) (Submitted on 11/5/2024)  FLORIAN 7 TOTAL SCORE: 3    "

## 2024-11-06 ENCOUNTER — PATIENT OUTREACH (OUTPATIENT)
Dept: CARE COORDINATION | Facility: CLINIC | Age: 69
End: 2024-11-06
Payer: MEDICARE

## (undated) DEVICE — SOL WATER IRRIG 1000ML BOTTLE 07139-09

## (undated) RX ORDER — SIMETHICONE 40MG/0.6ML
SUSPENSION, DROPS(FINAL DOSAGE FORM)(ML) ORAL
Status: DISPENSED
Start: 2017-12-19

## (undated) RX ORDER — FENTANYL CITRATE 50 UG/ML
INJECTION, SOLUTION INTRAMUSCULAR; INTRAVENOUS
Status: DISPENSED
Start: 2017-12-19